# Patient Record
Sex: FEMALE | Race: OTHER | Employment: OTHER | URBAN - METROPOLITAN AREA
[De-identification: names, ages, dates, MRNs, and addresses within clinical notes are randomized per-mention and may not be internally consistent; named-entity substitution may affect disease eponyms.]

---

## 2017-11-25 ENCOUNTER — HOSPITAL ENCOUNTER (EMERGENCY)
Facility: HOSPITAL | Age: 62
Discharge: HOME | End: 2017-11-26
Attending: EMERGENCY MEDICINE

## 2017-11-25 ENCOUNTER — APPOINTMENT (OUTPATIENT)
Dept: GENERAL RADIOLOGY | Facility: HOSPITAL | Age: 62
End: 2017-11-25

## 2017-11-25 ENCOUNTER — APPOINTMENT (OUTPATIENT)
Dept: CT IMAGING | Facility: HOSPITAL | Age: 62
End: 2017-11-25
Attending: EMERGENCY MEDICINE

## 2017-11-25 DIAGNOSIS — S82.121A CLOSED FRACTURE OF LATERAL PORTION OF RIGHT TIBIAL PLATEAU, INITIAL ENCOUNTER: Primary | ICD-10-CM

## 2017-11-25 PROCEDURE — 73560 X-RAY EXAM OF KNEE 1 OR 2: CPT | Performed by: EMERGENCY MEDICINE

## 2017-11-25 PROCEDURE — 73700 CT LOWER EXTREMITY W/O DYE: CPT | Performed by: EMERGENCY MEDICINE

## 2017-11-25 PROCEDURE — 99284 EMERGENCY DEPT VISIT MOD MDM: CPT

## 2017-11-25 RX ORDER — TRAMADOL HYDROCHLORIDE 50 MG/1
50 TABLET ORAL ONCE
Status: COMPLETED | OUTPATIENT
Start: 2017-11-25 | End: 2017-11-25

## 2017-11-25 RX ORDER — IBUPROFEN 600 MG/1
600 TABLET ORAL ONCE
Status: COMPLETED | OUTPATIENT
Start: 2017-11-25 | End: 2017-11-25

## 2017-11-25 RX ORDER — ACETAMINOPHEN 500 MG
1000 TABLET ORAL ONCE
Status: COMPLETED | OUTPATIENT
Start: 2017-11-25 | End: 2017-11-25

## 2017-11-25 RX ORDER — TRAMADOL HYDROCHLORIDE 50 MG/1
50 TABLET ORAL EVERY 4 HOURS PRN
Qty: 14 TABLET | Refills: 0 | Status: SHIPPED | OUTPATIENT
Start: 2017-11-25 | End: 2017-12-02

## 2017-11-26 VITALS
RESPIRATION RATE: 18 BRPM | WEIGHT: 175 LBS | HEART RATE: 63 BPM | HEIGHT: 68 IN | TEMPERATURE: 98 F | BODY MASS INDEX: 26.52 KG/M2 | DIASTOLIC BLOOD PRESSURE: 75 MMHG | SYSTOLIC BLOOD PRESSURE: 137 MMHG | OXYGEN SATURATION: 97 %

## 2017-11-26 NOTE — ED PROVIDER NOTES
Patient Seen in: [facility] AND [facility] Emergency Department    History   Patient presents with:  Fall (musculoskeletal, neurologic)    Stated Complaint: right knee pain from fall/ no loc     HPI    HPI: Aneudy Gómez is a 60year old female who presents aft contusion    ED Course   Labs Reviewed - No data to display    MDM     Radiology result:Xr Knee (1 Or 2 Views), Right (cpt=73560)    Result Date: 11/26/2017  CONCLUSION:  1. Acute nondisplaced fracture of the proximal tibia. 2. Lipohemarthrosis.  3. Finding

## 2017-11-26 NOTE — ED INITIAL ASSESSMENT (HPI)
PT reports mechanical fall appx 30 minutes pta, has pain/swelling/abrasion to right knee, has minor swelling/pain to left knee.

## 2020-06-10 PROBLEM — I48.91 ATRIAL FIBRILLATION AND FLUTTER (HCC): Status: ACTIVE | Noted: 2020-06-10

## 2020-06-10 PROBLEM — I48.92 ATRIAL FIBRILLATION AND FLUTTER (HCC): Status: ACTIVE | Noted: 2020-06-10

## 2020-06-10 PROBLEM — I10 ESSENTIAL HYPERTENSION: Status: ACTIVE | Noted: 2020-06-10

## 2020-06-10 PROBLEM — M17.0 PRIMARY OSTEOARTHRITIS OF BOTH KNEES: Status: ACTIVE | Noted: 2020-06-10

## 2020-06-10 PROBLEM — F41.9 ANXIETY: Status: ACTIVE | Noted: 2020-06-10

## 2020-06-10 NOTE — PROGRESS NOTES
Casey Low is a 71year old female.   Patient presents with:  Establish Care      HPI:   Pt comes as a new pt --here with her daughter Daisy Burnett out PSR   C/c establish care   C/o will go for left  knee surg July 17th with dr Wilner Campos   Any little thing stre shortness of breath, cough, wheezing  CARDIOVASCULAR: denies chest pain on exertion, palpitations,+ swelling in feet  GI: denies abdominal pain and denies heartburn, nausea or vomiting  MUS: + back pain--upper back and neck and sees PT and chiropractor  ,+

## 2020-07-01 ENCOUNTER — OFFICE VISIT (OUTPATIENT)
Dept: CARDIOLOGY CLINIC | Facility: CLINIC | Age: 65
End: 2020-07-01
Payer: MEDICARE

## 2020-07-01 ENCOUNTER — OFFICE VISIT (OUTPATIENT)
Dept: INTERNAL MEDICINE CLINIC | Facility: CLINIC | Age: 65
End: 2020-07-01
Payer: MEDICARE

## 2020-07-01 VITALS
SYSTOLIC BLOOD PRESSURE: 113 MMHG | RESPIRATION RATE: 18 BRPM | HEIGHT: 68 IN | DIASTOLIC BLOOD PRESSURE: 70 MMHG | BODY MASS INDEX: 28.34 KG/M2 | WEIGHT: 187 LBS | HEART RATE: 57 BPM

## 2020-07-01 VITALS
HEART RATE: 57 BPM | DIASTOLIC BLOOD PRESSURE: 75 MMHG | BODY MASS INDEX: 28.34 KG/M2 | HEIGHT: 68 IN | SYSTOLIC BLOOD PRESSURE: 130 MMHG | TEMPERATURE: 98 F | WEIGHT: 187 LBS

## 2020-07-01 DIAGNOSIS — E78.5 DYSLIPIDEMIA: ICD-10-CM

## 2020-07-01 DIAGNOSIS — Z01.818 PRE-OP EXAM: Primary | ICD-10-CM

## 2020-07-01 DIAGNOSIS — Z12.31 VISIT FOR SCREENING MAMMOGRAM: ICD-10-CM

## 2020-07-01 DIAGNOSIS — E55.9 VITAMIN D DEFICIENCY: ICD-10-CM

## 2020-07-01 DIAGNOSIS — M17.12 LOCALIZED PRIMARY OSTEOARTHRITIS OF LEFT LOWER LEG: ICD-10-CM

## 2020-07-01 DIAGNOSIS — I48.91 ATRIAL FIBRILLATION, UNSPECIFIED TYPE (HCC): Primary | ICD-10-CM

## 2020-07-01 DIAGNOSIS — M79.605 PAIN IN LEFT LEG: ICD-10-CM

## 2020-07-01 DIAGNOSIS — R53.1 WEAKNESS: ICD-10-CM

## 2020-07-01 DIAGNOSIS — E05.90 SUBCLINICAL HYPERTHYROIDISM: ICD-10-CM

## 2020-07-01 DIAGNOSIS — Z23 NEED FOR VACCINATION: ICD-10-CM

## 2020-07-01 PROCEDURE — 90715 TDAP VACCINE 7 YRS/> IM: CPT | Performed by: INTERNAL MEDICINE

## 2020-07-01 PROCEDURE — 90471 IMMUNIZATION ADMIN: CPT | Performed by: INTERNAL MEDICINE

## 2020-07-01 PROCEDURE — G0463 HOSPITAL OUTPT CLINIC VISIT: HCPCS | Performed by: INTERNAL MEDICINE

## 2020-07-01 PROCEDURE — G0463 HOSPITAL OUTPT CLINIC VISIT: HCPCS | Performed by: NURSE PRACTITIONER

## 2020-07-01 PROCEDURE — 99203 OFFICE O/P NEW LOW 30 MIN: CPT | Performed by: NURSE PRACTITIONER

## 2020-07-01 PROCEDURE — 99214 OFFICE O/P EST MOD 30 MIN: CPT | Performed by: INTERNAL MEDICINE

## 2020-07-01 RX ORDER — ATORVASTATIN CALCIUM 10 MG/1
5 TABLET, FILM COATED ORAL NIGHTLY
Qty: 45 TABLET | Refills: 3 | Status: SHIPPED | OUTPATIENT
Start: 2020-07-01 | End: 2021-01-27

## 2020-07-01 RX ORDER — ERGOCALCIFEROL 1.25 MG/1
50000 CAPSULE ORAL WEEKLY
Qty: 4 CAPSULE | Refills: 3 | Status: SHIPPED | OUTPATIENT
Start: 2020-07-01 | End: 2021-03-23

## 2020-07-01 NOTE — PROGRESS NOTES
Aneta Montoya is a 76year old female.   Patient presents with:  Pre-Op Exam: scheduled for 7/17 with Dr. David Lizarraga left total knee arthroplasty ph # [phone] fax# [phone]      HPI:   Pt comes for pre op  C/c pre op   C/o left total knee arthroplast Hyperlipidemia    • Hypothyroidism       Past Surgical History:   Procedure Laterality Date   • Appendectomy     •        section x 3    • Hemorrhoidectomy     • Other surgical history  1986    Fibroid   • Other surgical history      breas CULTURE, ROUTINE; Future  We will check labs, chest x-ray, urine, aware to do the stress test and echo as ordered by cardiology    Localized primary osteoarthritis of left lower leg  And  Pain in left leg   -     PTT, ACTIVATED;  Future  And  Weakness   -

## 2020-07-01 NOTE — PROGRESS NOTES
Edward Machado is a 76year old female. Patient presents with:  Consult  Atrial Fibrillation: hx PAF  Surgical/procedure Clearance: left knee replacement 7/17/20   Hypertension    HPI:   Patient comes in today for consultation.  She sees Dr. Mandi Leblanc and  (two) times daily as needed for Pain.  30 tablet 0      Past Medical History:   Diagnosis Date   • Anxiety    • Atrial fibrillation [facility])    • Essential hypertension    • Hyperlipidemia    • Hypothyroidism       Social History:  Social History    Tobacco Us She does have some risk factors for heart disease. She has been well managed with A. fib with amiodarone but due to its side effects might be advisable to switch to a different antiarrhythmic at some point.   I like her to follow-up with Dr. Carlos Solis week aft

## 2020-07-08 ENCOUNTER — HOSPITAL ENCOUNTER (OUTPATIENT)
Dept: CV DIAGNOSTICS | Facility: HOSPITAL | Age: 65
Discharge: HOME | End: 2020-07-08
Attending: NURSE PRACTITIONER
Payer: MEDICARE

## 2020-07-08 ENCOUNTER — HOSPITAL ENCOUNTER (OUTPATIENT)
Dept: NUCLEAR MEDICINE | Facility: HOSPITAL | Age: 65
End: 2020-07-08
Attending: NURSE PRACTITIONER
Payer: MEDICARE

## 2020-07-08 ENCOUNTER — HOSPITAL ENCOUNTER (OUTPATIENT)
Dept: NUCLEAR MEDICINE | Facility: HOSPITAL | Age: 65
Discharge: HOME | End: 2020-07-08
Attending: NURSE PRACTITIONER
Payer: MEDICARE

## 2020-07-08 ENCOUNTER — APPOINTMENT (OUTPATIENT)
Dept: CV DIAGNOSTICS | Facility: HOSPITAL | Age: 65
End: 2020-07-08
Attending: NURSE PRACTITIONER
Payer: MEDICARE

## 2020-07-08 ENCOUNTER — LAB ENCOUNTER (OUTPATIENT)
Dept: LAB | Facility: HOSPITAL | Age: 65
End: 2020-07-08
Attending: NURSE PRACTITIONER
Payer: MEDICARE

## 2020-07-08 DIAGNOSIS — E78.5 DYSLIPIDEMIA: ICD-10-CM

## 2020-07-08 DIAGNOSIS — R53.1 WEAKNESS: ICD-10-CM

## 2020-07-08 DIAGNOSIS — I48.91 ATRIAL FIBRILLATION, UNSPECIFIED TYPE (HCC): ICD-10-CM

## 2020-07-08 DIAGNOSIS — E05.90 SUBCLINICAL HYPERTHYROIDISM: ICD-10-CM

## 2020-07-08 DIAGNOSIS — Z01.818 PRE-OP EXAM: ICD-10-CM

## 2020-07-08 DIAGNOSIS — M79.605 PAIN IN LEFT LEG: ICD-10-CM

## 2020-07-08 LAB
ALBUMIN SERPL-MCNC: 3.4 G/DL (ref 3.4–5)
ALBUMIN/GLOB SERPL: 0.9 {RATIO} (ref 1–2)
ALP LIVER SERPL-CCNC: 81 U/L (ref 55–142)
ALT SERPL-CCNC: 35 U/L (ref 13–56)
ANION GAP SERPL CALC-SCNC: 4 MMOL/L (ref 0–18)
APTT PPP: 27.5 SECONDS (ref 23.2–35.3)
AST SERPL-CCNC: 29 U/L (ref 15–37)
BASOPHILS # BLD AUTO: 0.01 X10(3) UL (ref 0–0.2)
BASOPHILS NFR BLD AUTO: 0.2 %
BILIRUB SERPL-MCNC: 0.4 MG/DL (ref 0.1–2)
BILIRUB UR QL: NEGATIVE
BUN BLD-MCNC: 15 MG/DL (ref 7–18)
BUN/CREAT SERPL: 14.9 (ref 10–20)
CALCIUM BLD-MCNC: 10.1 MG/DL (ref 8.5–10.1)
CHLORIDE SERPL-SCNC: 110 MMOL/L (ref 98–112)
CHOLEST SMN-MCNC: 270 MG/DL (ref ?–200)
CO2 SERPL-SCNC: 29 MMOL/L (ref 21–32)
COLOR UR: YELLOW
CREAT BLD-MCNC: 1.01 MG/DL (ref 0.55–1.02)
DEPRECATED RDW RBC AUTO: 46.5 FL (ref 35.1–46.3)
EOSINOPHIL # BLD AUTO: 0.24 X10(3) UL (ref 0–0.7)
EOSINOPHIL NFR BLD AUTO: 5.2 %
ERYTHROCYTE [DISTWIDTH] IN BLOOD BY AUTOMATED COUNT: 13.7 % (ref 11–15)
GLOBULIN PLAS-MCNC: 3.6 G/DL (ref 2.8–4.4)
GLUCOSE BLD-MCNC: 88 MG/DL (ref 70–99)
GLUCOSE UR-MCNC: NEGATIVE MG/DL
HCT VFR BLD AUTO: 40.2 % (ref 35–48)
HDLC SERPL-MCNC: 72 MG/DL (ref 40–59)
HGB BLD-MCNC: 13.3 G/DL (ref 12–16)
HGB UR QL STRIP.AUTO: NEGATIVE
IMM GRANULOCYTES # BLD AUTO: 0.01 X10(3) UL (ref 0–1)
IMM GRANULOCYTES NFR BLD: 0.2 %
KETONES UR-MCNC: NEGATIVE MG/DL
LDLC SERPL CALC-MCNC: 180 MG/DL (ref ?–100)
LEUKOCYTE ESTERASE UR QL STRIP.AUTO: NEGATIVE
LYMPHOCYTES # BLD AUTO: 1.18 X10(3) UL (ref 1–4)
LYMPHOCYTES NFR BLD AUTO: 25.6 %
M PROTEIN MFR SERPL ELPH: 7 G/DL (ref 6.4–8.2)
MCH RBC QN AUTO: 30.5 PG (ref 26–34)
MCHC RBC AUTO-ENTMCNC: 33.1 G/DL (ref 31–37)
MCV RBC AUTO: 92.2 FL (ref 80–100)
MONOCYTES # BLD AUTO: 0.47 X10(3) UL (ref 0.1–1)
MONOCYTES NFR BLD AUTO: 10.2 %
NEUTROPHILS # BLD AUTO: 2.7 X10 (3) UL (ref 1.5–7.7)
NEUTROPHILS # BLD AUTO: 2.7 X10(3) UL (ref 1.5–7.7)
NEUTROPHILS NFR BLD AUTO: 58.6 %
NITRITE UR QL STRIP.AUTO: NEGATIVE
NONHDLC SERPL-MCNC: 198 MG/DL (ref ?–130)
OSMOLALITY SERPL CALC.SUM OF ELEC: 296 MOSM/KG (ref 275–295)
PATIENT FASTING Y/N/NP: YES
PATIENT FASTING Y/N/NP: YES
PH UR: 7 [PH] (ref 5–8)
PLATELET # BLD AUTO: 210 10(3)UL (ref 150–450)
POTASSIUM SERPL-SCNC: 4.5 MMOL/L (ref 3.5–5.1)
PROT UR-MCNC: NEGATIVE MG/DL
RBC # BLD AUTO: 4.36 X10(6)UL (ref 3.8–5.3)
RBC #/AREA URNS AUTO: 0 /HPF
SODIUM SERPL-SCNC: 143 MMOL/L (ref 136–145)
SP GR UR STRIP: 1.02 (ref 1–1.03)
T3FREE SERPL-MCNC: 2.54 PG/ML (ref 2.4–4.2)
TRIGL SERPL-MCNC: 91 MG/DL (ref 30–149)
TSI SER-ACNC: 0.58 MIU/ML (ref 0.36–3.74)
UROBILINOGEN UR STRIP-ACNC: <2
VLDLC SERPL CALC-MCNC: 18 MG/DL (ref 0–30)
WBC # BLD AUTO: 4.6 X10(3) UL (ref 4–11)
WBC #/AREA URNS AUTO: 1 /HPF

## 2020-07-08 PROCEDURE — 93306 TTE W/DOPPLER COMPLETE: CPT | Performed by: NURSE PRACTITIONER

## 2020-07-08 PROCEDURE — 36415 COLL VENOUS BLD VENIPUNCTURE: CPT

## 2020-07-08 PROCEDURE — 93018 CV STRESS TEST I&R ONLY: CPT | Performed by: NURSE PRACTITIONER

## 2020-07-08 PROCEDURE — 84443 ASSAY THYROID STIM HORMONE: CPT

## 2020-07-08 PROCEDURE — 85025 COMPLETE CBC W/AUTO DIFF WBC: CPT

## 2020-07-08 PROCEDURE — 84481 FREE ASSAY (FT-3): CPT

## 2020-07-08 PROCEDURE — 80053 COMPREHEN METABOLIC PANEL: CPT

## 2020-07-08 PROCEDURE — 93016 CV STRESS TEST SUPVJ ONLY: CPT | Performed by: NURSE PRACTITIONER

## 2020-07-08 PROCEDURE — 85730 THROMBOPLASTIN TIME PARTIAL: CPT

## 2020-07-08 PROCEDURE — 81003 URINALYSIS AUTO W/O SCOPE: CPT

## 2020-07-08 PROCEDURE — 78452 HT MUSCLE IMAGE SPECT MULT: CPT | Performed by: NURSE PRACTITIONER

## 2020-07-08 PROCEDURE — 87086 URINE CULTURE/COLONY COUNT: CPT

## 2020-07-08 PROCEDURE — 93017 CV STRESS TEST TRACING ONLY: CPT | Performed by: NURSE PRACTITIONER

## 2020-07-08 PROCEDURE — 80061 LIPID PANEL: CPT

## 2020-07-09 ENCOUNTER — TELEPHONE (OUTPATIENT)
Dept: CARDIOLOGY CLINIC | Facility: CLINIC | Age: 65
End: 2020-07-09

## 2020-07-09 DIAGNOSIS — I48.0 PAROXYSMAL A-FIB (HCC): ICD-10-CM

## 2020-07-09 DIAGNOSIS — E78.5 DYSLIPIDEMIA: Primary | ICD-10-CM

## 2020-07-09 NOTE — TELEPHONE ENCOUNTER
S/w Savana Trevizo (dtr) and relayed all instructions, as requested. F/u appt made with PP.  Surgical clearance letter generated and sent      Notes recorded by Kathaleen Dandy, RN on 7/9/2020 at 2:47 PM CDT  Per language line #[id number], s/w Ernie Moe relayed- LDL very

## 2020-07-13 ENCOUNTER — LAB ENCOUNTER (OUTPATIENT)
Dept: LAB | Facility: REFERENCE LAB | Age: 65
End: 2020-07-13
Attending: ORTHOPAEDIC SURGERY
Payer: MEDICARE

## 2020-07-13 ENCOUNTER — HOSPITAL ENCOUNTER (OUTPATIENT)
Dept: GENERAL RADIOLOGY | Facility: HOSPITAL | Age: 65
Discharge: HOME | End: 2020-07-13
Attending: INTERNAL MEDICINE
Payer: MEDICARE

## 2020-07-13 ENCOUNTER — TELEPHONE (OUTPATIENT)
Dept: INTERNAL MEDICINE CLINIC | Facility: CLINIC | Age: 65
End: 2020-07-13

## 2020-07-13 DIAGNOSIS — Z01.818 PRE-OP EXAM: ICD-10-CM

## 2020-07-13 DIAGNOSIS — Z01.818 PREOPERATIVE TESTING: ICD-10-CM

## 2020-07-13 LAB
ANTIBODY SCREEN: NEGATIVE
RH BLOOD TYPE: POSITIVE

## 2020-07-13 PROCEDURE — 87641 MR-STAPH DNA AMP PROBE: CPT

## 2020-07-13 PROCEDURE — 71046 X-RAY EXAM CHEST 2 VIEWS: CPT | Performed by: INTERNAL MEDICINE

## 2020-07-13 PROCEDURE — 86901 BLOOD TYPING SEROLOGIC RH(D): CPT

## 2020-07-13 PROCEDURE — 36415 COLL VENOUS BLD VENIPUNCTURE: CPT

## 2020-07-13 PROCEDURE — 86850 RBC ANTIBODY SCREEN: CPT

## 2020-07-13 PROCEDURE — 86900 BLOOD TYPING SEROLOGIC ABO: CPT

## 2020-07-13 NOTE — TELEPHONE ENCOUNTER
Aubree Abdi from Dr. Larissa Green' office called regarding  needing the OK for surgery clearance from Dr. Denny Walker.       Preop clearance needs to be  faxed to [phone].

## 2020-07-14 ENCOUNTER — LAB ENCOUNTER (OUTPATIENT)
Dept: LAB | Facility: HOSPITAL | Age: 65
End: 2020-07-14
Attending: ORTHOPAEDIC SURGERY
Payer: MEDICARE

## 2020-07-14 DIAGNOSIS — Z01.818 PREOPERATIVE TESTING: ICD-10-CM

## 2020-07-14 LAB
MRSA DNA SPEC QL NAA+PROBE: NEGATIVE
SARS-COV-2 RNA RESP QL NAA+PROBE: NOT DETECTED

## 2020-07-14 NOTE — TELEPHONE ENCOUNTER
Labs and urine and stress test  reviewed   cxr done today is still pending   Will write addendum onceout and then pls fax over the note

## 2020-07-15 ENCOUNTER — TELEPHONE (OUTPATIENT)
Dept: INTERNAL MEDICINE CLINIC | Facility: CLINIC | Age: 65
End: 2020-07-15

## 2020-07-15 NOTE — TELEPHONE ENCOUNTER
Dr. Gideon Gallo' Office called in stating that they received H&P, labs, and cxr but they are still awaiting clearance from Dr. Sue Walls.     Fax# [phone]    Patient is scheduled for Friday, 7/17. She is requesting clearance by end of day today. Please advise.

## 2020-07-16 NOTE — H&P
[facility] Patient Status:  Surgery Admit - Inpt    1955 MRN [id number]   Location [place] [address] Attending Dheeraj Back, *   Hosp Day # 0 PCP FELIX Delgado 5' 9\" (1.753 m), weight 185 lb (83.9 kg). General appearance: alert, appears stated age and cooperative  Extremities: On physical examination she has a markedly antalgic gait on the left side.  There is a 10 degree flexion contracture with further flex

## 2020-07-17 ENCOUNTER — APPOINTMENT (OUTPATIENT)
Dept: GENERAL RADIOLOGY | Facility: HOSPITAL | Age: 65
DRG: 470 | End: 2020-07-17
Attending: PHYSICIAN ASSISTANT
Payer: MEDICARE

## 2020-07-17 ENCOUNTER — HOSPITAL ENCOUNTER (INPATIENT)
Facility: HOSPITAL | Age: 65
LOS: 3 days | Discharge: HOME HEALTH | DRG: 470 | End: 2020-07-20
Attending: ORTHOPAEDIC SURGERY | Admitting: ORTHOPAEDIC SURGERY
Payer: MEDICARE

## 2020-07-17 ENCOUNTER — ANESTHESIA (OUTPATIENT)
Dept: SURGERY | Facility: HOSPITAL | Age: 65
DRG: 470 | End: 2020-07-17
Payer: MEDICARE

## 2020-07-17 ENCOUNTER — ANESTHESIA EVENT (OUTPATIENT)
Dept: SURGERY | Facility: HOSPITAL | Age: 65
DRG: 470 | End: 2020-07-17
Payer: MEDICARE

## 2020-07-17 DIAGNOSIS — Z01.818 PREOPERATIVE TESTING: Primary | ICD-10-CM

## 2020-07-17 PROBLEM — E03.9 HYPOTHYROIDISM: Chronic | Status: ACTIVE | Noted: 2020-07-17

## 2020-07-17 PROBLEM — M17.12 PRIMARY OSTEOARTHRITIS OF LEFT KNEE: Status: ACTIVE | Noted: 2020-07-17

## 2020-07-17 PROBLEM — E78.5 HYPERLIPIDEMIA: Chronic | Status: ACTIVE | Noted: 2020-07-17

## 2020-07-17 PROBLEM — I48.0 PAROXYSMAL ATRIAL FIBRILLATION (HCC): Chronic | Status: ACTIVE | Noted: 2020-07-17

## 2020-07-17 LAB
DEPRECATED RDW RBC AUTO: 45.5 FL (ref 35.1–46.3)
ERYTHROCYTE [DISTWIDTH] IN BLOOD BY AUTOMATED COUNT: 13.3 % (ref 11–15)
HCT VFR BLD AUTO: 38 % (ref 35–48)
HGB BLD-MCNC: 12.7 G/DL (ref 12–16)
MCH RBC QN AUTO: 30.8 PG (ref 26–34)
MCHC RBC AUTO-ENTMCNC: 33.4 G/DL (ref 31–37)
MCV RBC AUTO: 92.2 FL (ref 80–100)
PLATELET # BLD AUTO: 170 10(3)UL (ref 150–450)
RBC # BLD AUTO: 4.12 X10(6)UL (ref 3.8–5.3)
WBC # BLD AUTO: 5.1 X10(3) UL (ref 4–11)

## 2020-07-17 PROCEDURE — 73560 X-RAY EXAM OF KNEE 1 OR 2: CPT | Performed by: PHYSICIAN ASSISTANT

## 2020-07-17 PROCEDURE — 99232 SBSQ HOSP IP/OBS MODERATE 35: CPT | Performed by: HOSPITALIST

## 2020-07-17 PROCEDURE — 0SRD0J9 REPLACEMENT OF LEFT KNEE JOINT WITH SYNTHETIC SUBSTITUTE, CEMENTED, OPEN APPROACH: ICD-10-PCS | Performed by: ORTHOPAEDIC SURGERY

## 2020-07-17 DEVICE — RESURFACING PATELLA SIZE 3
Type: IMPLANTABLE DEVICE | Site: KNEE | Status: FUNCTIONAL
Brand: GMK PRIMARY TOTAL KNEE SYSTEM

## 2020-07-17 DEVICE — FEMUR SPHERE CEMENTED LEFT, SIZE 5+
Type: IMPLANTABLE DEVICE | Site: KNEE | Status: FUNCTIONAL
Brand: GMK SPHERE TOTAL KNEE SYSTEM

## 2020-07-17 DEVICE — REFOBACIN BC R 1X40 US: Type: IMPLANTABLE DEVICE | Site: KNEE | Status: FUNCTIONAL

## 2020-07-17 DEVICE — GMK SPHERE KNEE: Type: IMPLANTABLE DEVICE | Status: FUNCTIONAL

## 2020-07-17 DEVICE — FIXED TIBIAL TRAY CEMENTED LEFT, SIZE 4
Type: IMPLANTABLE DEVICE | Site: KNEE | Status: FUNCTIONAL
Brand: GMK PRIMARY TOTAL KNEE SYSTEM

## 2020-07-17 DEVICE — TIBIAL INSERT FIXED SPHERE FLEX #4/10 MM L
Type: IMPLANTABLE DEVICE | Site: KNEE | Status: FUNCTIONAL
Brand: GMK SPHERE TOTAL KNEE SYSTEM

## 2020-07-17 RX ORDER — HYDROCODONE BITARTRATE AND ACETAMINOPHEN 7.5; 325 MG/1; MG/1
1 TABLET ORAL EVERY 6 HOURS PRN
Status: ACTIVE | OUTPATIENT
Start: 2020-07-17 | End: 2020-07-18

## 2020-07-17 RX ORDER — HYDROMORPHONE HYDROCHLORIDE 1 MG/ML
0.6 INJECTION, SOLUTION INTRAMUSCULAR; INTRAVENOUS; SUBCUTANEOUS EVERY 5 MIN PRN
Status: DISCONTINUED | OUTPATIENT
Start: 2020-07-17 | End: 2020-07-17 | Stop reason: HOSPADM

## 2020-07-17 RX ORDER — NALOXONE HYDROCHLORIDE 0.4 MG/ML
0.08 INJECTION, SOLUTION INTRAMUSCULAR; INTRAVENOUS; SUBCUTANEOUS
Status: ACTIVE | OUTPATIENT
Start: 2020-07-17 | End: 2020-07-18

## 2020-07-17 RX ORDER — SODIUM CHLORIDE, SODIUM LACTATE, POTASSIUM CHLORIDE, CALCIUM CHLORIDE 600; 310; 30; 20 MG/100ML; MG/100ML; MG/100ML; MG/100ML
INJECTION, SOLUTION INTRAVENOUS CONTINUOUS
Status: DISCONTINUED | OUTPATIENT
Start: 2020-07-17 | End: 2020-07-20

## 2020-07-17 RX ORDER — HYDROMORPHONE HYDROCHLORIDE 1 MG/ML
0.4 INJECTION, SOLUTION INTRAMUSCULAR; INTRAVENOUS; SUBCUTANEOUS EVERY 5 MIN PRN
Status: DISCONTINUED | OUTPATIENT
Start: 2020-07-17 | End: 2020-07-17 | Stop reason: HOSPADM

## 2020-07-17 RX ORDER — ACETAMINOPHEN 325 MG/1
650 TABLET ORAL EVERY 6 HOURS PRN
Status: ACTIVE | OUTPATIENT
Start: 2020-07-17 | End: 2020-07-18

## 2020-07-17 RX ORDER — PROCHLORPERAZINE EDISYLATE 5 MG/ML
5 INJECTION INTRAMUSCULAR; INTRAVENOUS ONCE AS NEEDED
Status: DISCONTINUED | OUTPATIENT
Start: 2020-07-17 | End: 2020-07-17 | Stop reason: HOSPADM

## 2020-07-17 RX ORDER — MORPHINE SULFATE 4 MG/ML
4 INJECTION, SOLUTION INTRAMUSCULAR; INTRAVENOUS EVERY 2 HOUR PRN
Status: DISCONTINUED | OUTPATIENT
Start: 2020-07-17 | End: 2020-07-20

## 2020-07-17 RX ORDER — MORPHINE SULFATE 2 MG/ML
1 INJECTION, SOLUTION INTRAMUSCULAR; INTRAVENOUS EVERY 2 HOUR PRN
Status: DISCONTINUED | OUTPATIENT
Start: 2020-07-17 | End: 2020-07-20

## 2020-07-17 RX ORDER — PROCHLORPERAZINE EDISYLATE 5 MG/ML
5 INJECTION INTRAMUSCULAR; INTRAVENOUS ONCE AS NEEDED
Status: ACTIVE | OUTPATIENT
Start: 2020-07-17 | End: 2020-07-17

## 2020-07-17 RX ORDER — DIPHENHYDRAMINE HYDROCHLORIDE 50 MG/ML
25 INJECTION INTRAMUSCULAR; INTRAVENOUS ONCE AS NEEDED
Status: ACTIVE | OUTPATIENT
Start: 2020-07-17 | End: 2020-07-17

## 2020-07-17 RX ORDER — FAMOTIDINE 20 MG/1
20 TABLET ORAL ONCE
Status: DISCONTINUED | OUTPATIENT
Start: 2020-07-17 | End: 2020-07-17 | Stop reason: HOSPADM

## 2020-07-17 RX ORDER — HYDROCODONE BITARTRATE AND ACETAMINOPHEN 5; 325 MG/1; MG/1
1 TABLET ORAL EVERY 4 HOURS PRN
Status: DISCONTINUED | OUTPATIENT
Start: 2020-07-17 | End: 2020-07-20

## 2020-07-17 RX ORDER — HYDROCODONE BITARTRATE AND ACETAMINOPHEN 5; 325 MG/1; MG/1
2 TABLET ORAL EVERY 4 HOURS PRN
Status: DISCONTINUED | OUTPATIENT
Start: 2020-07-17 | End: 2020-07-20

## 2020-07-17 RX ORDER — ACETAMINOPHEN 325 MG/1
650 TABLET ORAL EVERY 4 HOURS PRN
Status: DISCONTINUED | OUTPATIENT
Start: 2020-07-17 | End: 2020-07-20

## 2020-07-17 RX ORDER — LEVOTHYROXINE AND LIOTHYRONINE 38; 9 UG/1; UG/1
60 TABLET ORAL DAILY
Status: DISCONTINUED | OUTPATIENT
Start: 2020-07-18 | End: 2020-07-20

## 2020-07-17 RX ORDER — FAMOTIDINE 10 MG/ML
20 INJECTION, SOLUTION INTRAVENOUS 2 TIMES DAILY
Status: DISCONTINUED | OUTPATIENT
Start: 2020-07-17 | End: 2020-07-20

## 2020-07-17 RX ORDER — SODIUM PHOSPHATE, DIBASIC AND SODIUM PHOSPHATE, MONOBASIC 7; 19 G/133ML; G/133ML
1 ENEMA RECTAL ONCE AS NEEDED
Status: DISCONTINUED | OUTPATIENT
Start: 2020-07-17 | End: 2020-07-20

## 2020-07-17 RX ORDER — MELATONIN
325
Status: DISCONTINUED | OUTPATIENT
Start: 2020-07-17 | End: 2020-07-20

## 2020-07-17 RX ORDER — SENNOSIDES 8.6 MG
17.2 TABLET ORAL NIGHTLY
Status: DISCONTINUED | OUTPATIENT
Start: 2020-07-17 | End: 2020-07-20

## 2020-07-17 RX ORDER — MIDAZOLAM HYDROCHLORIDE 1 MG/ML
INJECTION INTRAMUSCULAR; INTRAVENOUS AS NEEDED
Status: DISCONTINUED | OUTPATIENT
Start: 2020-07-17 | End: 2020-07-17 | Stop reason: SURG

## 2020-07-17 RX ORDER — METOCLOPRAMIDE 10 MG/1
10 TABLET ORAL ONCE
Status: DISCONTINUED | OUTPATIENT
Start: 2020-07-17 | End: 2020-07-17 | Stop reason: HOSPADM

## 2020-07-17 RX ORDER — MORPHINE SULFATE 10 MG/ML
6 INJECTION, SOLUTION INTRAMUSCULAR; INTRAVENOUS EVERY 10 MIN PRN
Status: DISCONTINUED | OUTPATIENT
Start: 2020-07-17 | End: 2020-07-17 | Stop reason: HOSPADM

## 2020-07-17 RX ORDER — HALOPERIDOL 5 MG/ML
0.5 INJECTION INTRAMUSCULAR ONCE AS NEEDED
Status: ACTIVE | OUTPATIENT
Start: 2020-07-17 | End: 2020-07-17

## 2020-07-17 RX ORDER — HYDROMORPHONE HYDROCHLORIDE 1 MG/ML
0.4 INJECTION, SOLUTION INTRAMUSCULAR; INTRAVENOUS; SUBCUTANEOUS
Status: ACTIVE | OUTPATIENT
Start: 2020-07-17 | End: 2020-07-18

## 2020-07-17 RX ORDER — ACETAMINOPHEN 500 MG
1000 TABLET ORAL ONCE
Status: COMPLETED | OUTPATIENT
Start: 2020-07-17 | End: 2020-07-17

## 2020-07-17 RX ORDER — DIPHENHYDRAMINE HCL 25 MG
25 CAPSULE ORAL EVERY 4 HOURS PRN
Status: DISCONTINUED | OUTPATIENT
Start: 2020-07-17 | End: 2020-07-20

## 2020-07-17 RX ORDER — DOCUSATE SODIUM 100 MG/1
100 CAPSULE, LIQUID FILLED ORAL 2 TIMES DAILY
Status: DISCONTINUED | OUTPATIENT
Start: 2020-07-17 | End: 2020-07-20

## 2020-07-17 RX ORDER — ONDANSETRON 2 MG/ML
4 INJECTION INTRAMUSCULAR; INTRAVENOUS ONCE AS NEEDED
Status: ACTIVE | OUTPATIENT
Start: 2020-07-17 | End: 2020-07-17

## 2020-07-17 RX ORDER — GABAPENTIN 600 MG/1
600 TABLET ORAL ONCE
Status: COMPLETED | OUTPATIENT
Start: 2020-07-17 | End: 2020-07-17

## 2020-07-17 RX ORDER — AMIODARONE HYDROCHLORIDE 200 MG/1
100 TABLET ORAL 2 TIMES DAILY WITH MEALS
Status: DISCONTINUED | OUTPATIENT
Start: 2020-07-17 | End: 2020-07-20

## 2020-07-17 RX ORDER — HYDROMORPHONE HYDROCHLORIDE 1 MG/ML
0.6 INJECTION, SOLUTION INTRAMUSCULAR; INTRAVENOUS; SUBCUTANEOUS
Status: ACTIVE | OUTPATIENT
Start: 2020-07-17 | End: 2020-07-18

## 2020-07-17 RX ORDER — DIPHENHYDRAMINE HYDROCHLORIDE 50 MG/ML
12.5 INJECTION INTRAMUSCULAR; INTRAVENOUS EVERY 4 HOURS PRN
Status: ACTIVE | OUTPATIENT
Start: 2020-07-17 | End: 2020-07-18

## 2020-07-17 RX ORDER — HYDROCODONE BITARTRATE AND ACETAMINOPHEN 7.5; 325 MG/1; MG/1
2 TABLET ORAL EVERY 6 HOURS PRN
Status: ACTIVE | OUTPATIENT
Start: 2020-07-17 | End: 2020-07-18

## 2020-07-17 RX ORDER — ONDANSETRON 2 MG/ML
4 INJECTION INTRAMUSCULAR; INTRAVENOUS ONCE AS NEEDED
Status: DISCONTINUED | OUTPATIENT
Start: 2020-07-17 | End: 2020-07-17 | Stop reason: HOSPADM

## 2020-07-17 RX ORDER — MORPHINE SULFATE 4 MG/ML
4 INJECTION, SOLUTION INTRAMUSCULAR; INTRAVENOUS EVERY 10 MIN PRN
Status: DISCONTINUED | OUTPATIENT
Start: 2020-07-17 | End: 2020-07-17 | Stop reason: HOSPADM

## 2020-07-17 RX ORDER — DEXTROSE, SODIUM CHLORIDE, AND POTASSIUM CHLORIDE 5; .45; .15 G/100ML; G/100ML; G/100ML
INJECTION INTRAVENOUS CONTINUOUS
Status: DISCONTINUED | OUTPATIENT
Start: 2020-07-17 | End: 2020-07-18

## 2020-07-17 RX ORDER — MORPHINE SULFATE 1 MG/ML
INJECTION, SOLUTION EPIDURAL; INTRATHECAL; INTRAVENOUS
Status: COMPLETED | OUTPATIENT
Start: 2020-07-17 | End: 2020-07-17

## 2020-07-17 RX ORDER — DIPHENHYDRAMINE HYDROCHLORIDE 50 MG/ML
12.5 INJECTION INTRAMUSCULAR; INTRAVENOUS EVERY 4 HOURS PRN
Status: DISCONTINUED | OUTPATIENT
Start: 2020-07-17 | End: 2020-07-20

## 2020-07-17 RX ORDER — POLYETHYLENE GLYCOL 3350 17 G/17G
17 POWDER, FOR SOLUTION ORAL DAILY PRN
Status: DISCONTINUED | OUTPATIENT
Start: 2020-07-17 | End: 2020-07-20

## 2020-07-17 RX ORDER — ONDANSETRON 2 MG/ML
4 INJECTION INTRAMUSCULAR; INTRAVENOUS EVERY 4 HOURS PRN
Status: DISPENSED | OUTPATIENT
Start: 2020-07-17 | End: 2020-07-19

## 2020-07-17 RX ORDER — AMLODIPINE BESYLATE 5 MG/1
5 TABLET ORAL 2 TIMES DAILY
Status: DISCONTINUED | OUTPATIENT
Start: 2020-07-17 | End: 2020-07-20

## 2020-07-17 RX ORDER — MORPHINE SULFATE 2 MG/ML
2 INJECTION, SOLUTION INTRAMUSCULAR; INTRAVENOUS EVERY 2 HOUR PRN
Status: DISCONTINUED | OUTPATIENT
Start: 2020-07-17 | End: 2020-07-20

## 2020-07-17 RX ORDER — DIPHENHYDRAMINE HCL 25 MG
25 CAPSULE ORAL EVERY 4 HOURS PRN
Status: ACTIVE | OUTPATIENT
Start: 2020-07-17 | End: 2020-07-18

## 2020-07-17 RX ORDER — METOCLOPRAMIDE HYDROCHLORIDE 5 MG/ML
10 INJECTION INTRAMUSCULAR; INTRAVENOUS EVERY 6 HOURS PRN
Status: ACTIVE | OUTPATIENT
Start: 2020-07-17 | End: 2020-07-19

## 2020-07-17 RX ORDER — METOPROLOL TARTRATE 5 MG/5ML
2.5 INJECTION INTRAVENOUS ONCE
Status: DISCONTINUED | OUTPATIENT
Start: 2020-07-17 | End: 2020-07-17 | Stop reason: HOSPADM

## 2020-07-17 RX ORDER — ONDANSETRON 2 MG/ML
INJECTION INTRAMUSCULAR; INTRAVENOUS AS NEEDED
Status: DISCONTINUED | OUTPATIENT
Start: 2020-07-17 | End: 2020-07-17 | Stop reason: SURG

## 2020-07-17 RX ORDER — PROCHLORPERAZINE EDISYLATE 5 MG/ML
10 INJECTION INTRAMUSCULAR; INTRAVENOUS EVERY 6 HOURS PRN
Status: ACTIVE | OUTPATIENT
Start: 2020-07-17 | End: 2020-07-19

## 2020-07-17 RX ORDER — CELECOXIB 200 MG/1
200 CAPSULE ORAL EVERY 12 HOURS SCHEDULED
Status: DISCONTINUED | OUTPATIENT
Start: 2020-07-17 | End: 2020-07-19

## 2020-07-17 RX ORDER — VANCOMYCIN HYDROCHLORIDE
15 ONCE
Status: COMPLETED | OUTPATIENT
Start: 2020-07-17 | End: 2020-07-17

## 2020-07-17 RX ORDER — MORPHINE SULFATE 4 MG/ML
2 INJECTION, SOLUTION INTRAMUSCULAR; INTRAVENOUS EVERY 10 MIN PRN
Status: DISCONTINUED | OUTPATIENT
Start: 2020-07-17 | End: 2020-07-17 | Stop reason: HOSPADM

## 2020-07-17 RX ORDER — HYDROCODONE BITARTRATE AND ACETAMINOPHEN 5; 325 MG/1; MG/1
1 TABLET ORAL AS NEEDED
Status: DISCONTINUED | OUTPATIENT
Start: 2020-07-17 | End: 2020-07-17 | Stop reason: HOSPADM

## 2020-07-17 RX ORDER — TRANEXAMIC ACID 10 MG/ML
INJECTION, SOLUTION INTRAVENOUS AS NEEDED
Status: DISCONTINUED | OUTPATIENT
Start: 2020-07-17 | End: 2020-07-17 | Stop reason: SURG

## 2020-07-17 RX ORDER — BUPIVACAINE HYDROCHLORIDE AND EPINEPHRINE 5; 5 MG/ML; UG/ML
INJECTION, SOLUTION PERINEURAL AS NEEDED
Status: DISCONTINUED | OUTPATIENT
Start: 2020-07-17 | End: 2020-07-17 | Stop reason: HOSPADM

## 2020-07-17 RX ORDER — HALOPERIDOL 5 MG/ML
0.25 INJECTION INTRAMUSCULAR ONCE AS NEEDED
Status: DISCONTINUED | OUTPATIENT
Start: 2020-07-17 | End: 2020-07-17 | Stop reason: HOSPADM

## 2020-07-17 RX ORDER — CEFAZOLIN SODIUM/WATER 2 G/20 ML
2 SYRINGE (ML) INTRAVENOUS ONCE
Status: COMPLETED | OUTPATIENT
Start: 2020-07-17 | End: 2020-07-17

## 2020-07-17 RX ORDER — BISACODYL 10 MG
10 SUPPOSITORY, RECTAL RECTAL
Status: DISCONTINUED | OUTPATIENT
Start: 2020-07-17 | End: 2020-07-20

## 2020-07-17 RX ORDER — BUPIVACAINE HYDROCHLORIDE 7.5 MG/ML
INJECTION, SOLUTION INTRASPINAL
Status: COMPLETED | OUTPATIENT
Start: 2020-07-17 | End: 2020-07-17

## 2020-07-17 RX ORDER — HYDROCODONE BITARTRATE AND ACETAMINOPHEN 5; 325 MG/1; MG/1
2 TABLET ORAL AS NEEDED
Status: DISCONTINUED | OUTPATIENT
Start: 2020-07-17 | End: 2020-07-17 | Stop reason: HOSPADM

## 2020-07-17 RX ORDER — LIDOCAINE HYDROCHLORIDE 10 MG/ML
INJECTION, SOLUTION INFILTRATION; PERINEURAL
Status: COMPLETED | OUTPATIENT
Start: 2020-07-17 | End: 2020-07-17

## 2020-07-17 RX ORDER — NALOXONE HYDROCHLORIDE 0.4 MG/ML
80 INJECTION, SOLUTION INTRAMUSCULAR; INTRAVENOUS; SUBCUTANEOUS AS NEEDED
Status: DISCONTINUED | OUTPATIENT
Start: 2020-07-17 | End: 2020-07-17 | Stop reason: HOSPADM

## 2020-07-17 RX ORDER — HYDROMORPHONE HYDROCHLORIDE 1 MG/ML
0.2 INJECTION, SOLUTION INTRAMUSCULAR; INTRAVENOUS; SUBCUTANEOUS EVERY 5 MIN PRN
Status: DISCONTINUED | OUTPATIENT
Start: 2020-07-17 | End: 2020-07-17 | Stop reason: HOSPADM

## 2020-07-17 RX ORDER — MECLIZINE HCL 12.5 MG/1
25 TABLET ORAL ONCE
Status: COMPLETED | OUTPATIENT
Start: 2020-07-17 | End: 2020-07-17

## 2020-07-17 RX ORDER — CELECOXIB 200 MG/1
400 CAPSULE ORAL ONCE
Status: COMPLETED | OUTPATIENT
Start: 2020-07-17 | End: 2020-07-17

## 2020-07-17 RX ORDER — ATORVASTATIN CALCIUM 10 MG/1
5 TABLET, FILM COATED ORAL NIGHTLY
Status: DISCONTINUED | OUTPATIENT
Start: 2020-07-17 | End: 2020-07-20

## 2020-07-17 RX ORDER — FAMOTIDINE 20 MG/1
20 TABLET ORAL 2 TIMES DAILY
Status: DISCONTINUED | OUTPATIENT
Start: 2020-07-17 | End: 2020-07-20

## 2020-07-17 RX ORDER — SODIUM CHLORIDE, SODIUM LACTATE, POTASSIUM CHLORIDE, CALCIUM CHLORIDE 600; 310; 30; 20 MG/100ML; MG/100ML; MG/100ML; MG/100ML
INJECTION, SOLUTION INTRAVENOUS CONTINUOUS
Status: DISCONTINUED | OUTPATIENT
Start: 2020-07-17 | End: 2020-07-17 | Stop reason: HOSPADM

## 2020-07-17 RX ORDER — ALPRAZOLAM 0.5 MG/1
0.5 TABLET ORAL 2 TIMES DAILY PRN
Status: DISCONTINUED | OUTPATIENT
Start: 2020-07-17 | End: 2020-07-20

## 2020-07-17 RX ADMIN — SODIUM CHLORIDE, SODIUM LACTATE, POTASSIUM CHLORIDE, CALCIUM CHLORIDE: 600; 310; 30; 20 INJECTION, SOLUTION INTRAVENOUS at 09:19:00

## 2020-07-17 RX ADMIN — LIDOCAINE HYDROCHLORIDE 5 ML: 10 INJECTION, SOLUTION INFILTRATION; PERINEURAL at 08:02:00

## 2020-07-17 RX ADMIN — TRANEXAMIC ACID 1000 MG: 10 INJECTION, SOLUTION INTRAVENOUS at 08:20:00

## 2020-07-17 RX ADMIN — CEFAZOLIN SODIUM/WATER 2 G: 2 G/20 ML SYRINGE (ML) INTRAVENOUS at 08:12:00

## 2020-07-17 RX ADMIN — SODIUM CHLORIDE, SODIUM LACTATE, POTASSIUM CHLORIDE, CALCIUM CHLORIDE: 600; 310; 30; 20 INJECTION, SOLUTION INTRAVENOUS at 09:21:00

## 2020-07-17 RX ADMIN — VANCOMYCIN HYDROCHLORIDE 1.26 G: at 08:18:00

## 2020-07-17 RX ADMIN — MORPHINE SULFATE 0.3 MG: 1 INJECTION, SOLUTION EPIDURAL; INTRATHECAL; INTRAVENOUS at 08:02:00

## 2020-07-17 RX ADMIN — BUPIVACAINE HYDROCHLORIDE 1.6 ML: 7.5 INJECTION, SOLUTION INTRASPINAL at 08:02:00

## 2020-07-17 RX ADMIN — ONDANSETRON 4 MG: 2 INJECTION INTRAMUSCULAR; INTRAVENOUS at 08:32:00

## 2020-07-17 RX ADMIN — MIDAZOLAM HYDROCHLORIDE 2 MG: 1 INJECTION INTRAMUSCULAR; INTRAVENOUS at 08:02:00

## 2020-07-17 NOTE — PROGRESS NOTES
[facility]    Progress Note    Karie Serum Patient Status:  Inpatient    1955 MRN [id number]   Location [facility] 4W/SW/SE Attending Hollis Jerry MD   Hosp Day # 0 PCP Joseph Arias MD        Subjective:     Eva Horta fibrillation ([address]  MAINTAINED IN SR WITH AMIODARONE, CONT HOME MEDS, MONITOR.              Results:     Lab Results   Component Value Date    WBC 5.1 07/17/2020    HGB 12.7 07/17/2020    HCT 38.0 07/17/2020    .0 07/17/2020    CREATSERUM 1.01 07/08/

## 2020-07-17 NOTE — ANESTHESIA PROCEDURE NOTES
Spinal Block  Date/Time: 7/17/2020 8:02 AM  Performed by: Elysia Espinosa MD  Authorized by: Elysia Espinosa MD       General Information and Staff    Start Time:  7/17/2020 7:59 AM  End Time:  7/17/2020 8:02 AM  Anesthesiologist:  Elysia Espinosa MD  Perf Michell Hope(Attending)

## 2020-07-17 NOTE — BRIEF OP NOTE
Pre-Operative Diagnosis: osteoarthritis left knee     Post-Operative Diagnosis: osteoarthritis left knee      Procedure Performed:   Procedure(s):  left total knee arthroplasty    Surgeon(s) and Role:     * Ivette Parks MD - Primary    Assistant

## 2020-07-17 NOTE — CM/SW NOTE
MELVIN met with the pt. And her dtr. [facility] [place] at bedside. Per the pt's request her dtr. Translated for her. The pt. Lives with her dtr. And  in a 2 level home, but the pt. Resides on the main level. The pt.  Reports being independent prior to admission wit

## 2020-07-17 NOTE — ANESTHESIA POSTPROCEDURE EVALUATION
Patient: Monica Skelton    Procedure Summary     Date:  07/17/20 Room / Location:  [facility] OR  / [facility] OR    Anesthesia Start:  1239 Anesthesia Stop:      Procedure:  KNEE TOTAL REPLACEMENT (Left Knee) Diagnosis:  (osteoarthritis left knee)

## 2020-07-17 NOTE — ANESTHESIA PREPROCEDURE EVALUATION
Anesthesia PreOp Note    HPI:     Hawk Barnard is a 76year old female who presents for preoperative consultation requested by: Vahe Smith MD    Date of Surgery: 7/17/2020    Procedure(s):  KNEE TOTAL REPLACEMENT  Indication: Payam Later 25 MG Oral Tab, 25 mg 2 (two) times daily. , Disp: , Rfl: , 7/16/2020 at 2100  NP THYROID 60 MG Oral Tab, TK 1 T PO IN THE MORNING, Disp: , Rfl: , 7/16/2020 at 0900  ALPRAZolam 0.5 MG Oral Tab, Take 1 tablet (0.5 mg total) by mouth as needed. , Disp: 30 ta Sexual Activity      Alcohol use: Not Currently      Drug use: Never      Sexual activity: Not on file    Lifestyle      Physical activity:        Days per week: Not on file        Minutes per session: Not on file      Stress: Not on file    Relationships Pulmonary - normal exam   Cardiovascular   (+) hypertension, dysrhythmias,     Rhythm: regular  Rate: normal    Neuro/Psych    (+)  anxiety/panic attacks,      (-) CVA    GI/Hepatic/Renal - negative ROS     Endo/Other - negative ROS   Abdominal  - normal e

## 2020-07-17 NOTE — PLAN OF CARE
Patient is alert and oriented X 3, American speaking. Zamarripa in place. On room air, denies SOB. No acute distress noted. Patient is complaining of dizziness - bradycardia noted, MD aware and ordered meclinzine. Tele in place. Iv fluids continued.  Up with one side effects  - Notify MD/LIP if interventions unsuccessful or patient reports new pain  - Anticipate increased pain with activity and pre-medicate as appropriate  Outcome: Progressing     Problem: RISK FOR INFECTION - ADULT  Goal: Absence of fever/infecti system  Outcome: Progressing

## 2020-07-17 NOTE — INTERVAL H&P NOTE
Pre-op Diagnosis: osteoarthritis left knee    The above referenced H&P was reviewed by Frederick Kat MD on 7/17/2020, the patient was examined and no significant changes have occurred in the patient's condition since the H&P was performed.   I disc

## 2020-07-17 NOTE — PHYSICAL THERAPY NOTE
PHYSICAL THERAPY KNEE EVALUATION - INPATIENT       Room Number: 432/432-A  Evaluation Date: 7/17/2020  Type of Evaluation: Initial  Physician Order: PT Eval and Treat    Presenting Problem: L TKA  Reason for Therapy: Mobility Dysfunction and Discharge Plan [facility] '6 clicks' Inpatient Basic Mobility Short Form. Research supports that patients with this level of impairment may benefit from home with home PT to optimize functional outcomes.     Patient will benefit from continued IP PT services to address these OBJECTIVE  Precautions: Limb alert - left  Fall Risk: High fall risk    WEIGHT BEARING RESTRICTION  Weight Bearing Restriction: L lower extremity           L Lower Extremity: Weight Bearing as Tolerated    PAIN ASSESSMENT  Rating: Unable to rate  Locat Impairment: 41.77%   Standardized Score (AM-PAC Scale): 45.44   CMS Modifier (G-Code): CK    FUNCTIONAL ABILITY STATUS  Gait Assessment   Gait Assistance: Minimum assistance  Distance (ft): 6ft  Assistive Device: Rolling walker  Pattern: L Decreased stance

## 2020-07-18 PROCEDURE — 99232 SBSQ HOSP IP/OBS MODERATE 35: CPT | Performed by: HOSPITALIST

## 2020-07-18 RX ORDER — CELECOXIB 200 MG/1
200 CAPSULE ORAL EVERY 12 HOURS SCHEDULED
Qty: 60 CAPSULE | Refills: 0 | Status: SHIPPED | OUTPATIENT
Start: 2020-07-18 | End: 2020-07-20

## 2020-07-18 RX ORDER — HYDROCODONE BITARTRATE AND ACETAMINOPHEN 5; 325 MG/1; MG/1
1 TABLET ORAL EVERY 4 HOURS PRN
Qty: 50 TABLET | Refills: 0 | Status: SHIPPED | OUTPATIENT
Start: 2020-07-18 | End: 2020-07-29

## 2020-07-18 NOTE — HOME CARE LIAISON
Spoke with patient's daughter Antelmo Logan, confirmed agreeable to [address] All questions addressed and answered.

## 2020-07-18 NOTE — OCCUPATIONAL THERAPY NOTE
OCCUPATIONAL THERAPY EVALUATION - INPATIENT      Room Number: 432/432-A  Evaluation Date: 7/18/2020  Type of Evaluation: Initial  Presenting Problem: L TKR    Physician Order: IP Consult to Occupational Therapy  Reason for Therapy: ADL/IADL Dysfunction and training; Endurance training;Patient/Family education       OCCUPATIONAL THERAPY MEDICAL/SOCIAL HISTORY     Problem List   Principal Problem:    Primary osteoarthritis of left knee  Active Problems:    Essential hypertension    Hyperlipidemia    Hypothyroid Bathing (including washing, rinsing, drying)?: A Lot  -   Toileting, which includes using toilet, bedpan or urinal? : A Little  -   Putting on and taking off regular upper body clothing?: A Little  -   Taking care of personal grooming such as brushing teet

## 2020-07-18 NOTE — PLAN OF CARE
Patient alert and oriented, mostly Greek speaking, daughter at bedside to help with translation. Pain managed with norco, some nausea after dose but given zofran with relief. CMS intact, left knee with mepilex. Tele, no calls.  Voiding post real removal. frequently for physical needs  - Identify cognitive and physical deficits and behaviors that affect risk of falls.   - [place] fall precautions as indicated by assessment.  - Educate pt/family on patient safety including physical limitations  - Instruct p

## 2020-07-18 NOTE — PROGRESS NOTES
[facility]    Progress Note    Vesta Cardenas Patient Status:  Inpatient    1955 MRN [id number]   Location [facility] 4W/SW/SE Attending Aubrey Nicholson, [address] [facility] Day # 1 PCP Amira Roberts MD        Subjective:

## 2020-07-18 NOTE — PROGRESS NOTES
[facility]    Progress Note    Neema Carlos Patient Status:  Inpatient    1955 MRN [id number]   Location [facility] 4W/SW/SE Attending Constance Simpson, [address] Day # 1 PCP Brandy Gatica MD       Subjective:   Humberto Oliva Q12H  acetaminophen (TYLENOL) tab 650 mg, 650 mg, Oral, Q4H PRN    Or  HYDROcodone-acetaminophen (NORCO) 5-325 MG per tab 1 tablet, 1 tablet, Oral, Q4H PRN    Or  HYDROcodone-acetaminophen (NORCO) 5-325 MG per tab 2 tablet, 2 tablet, Oral, Q4H PRN  morphIN 13.3 07/08/2020      Lab Results   Component Value Date    .0 07/17/2020    .0 07/08/2020       No results for input(s): GLU, BUN, CREATSERUM, GFRAA, GFRNAA, CA, NA, K, CL, CO2 in the last 168 hours.           Assessment and Plan:         Prim

## 2020-07-18 NOTE — OPERATIVE REPORT
[facility]    PATIENT'S NAME: Mariama Mj   ATTENDING PHYSICIAN: Alexus Anderson MD   OPERATING PHYSICIAN: Derek Dao MD   PATIENT ACCOUNT#:   [id number]    LOCATION:  [address] [place] [address] Drive #:   [id number]       DATE OF used.  The patellar cut was made first, and a patella protector was placed over the cut surface of the patella which was then subluxed into the lateral gutter.   The anterior and posterior cruciate ligaments were excised, and the femoral template was placed pinned into place and preparations were made for the stemmed portion of the tibial component. The trochlear recess was cut for the femur. The trial components were then removed, and the bone was prepared with pulsatile lavage.   All three components were

## 2020-07-19 ENCOUNTER — APPOINTMENT (OUTPATIENT)
Dept: ULTRASOUND IMAGING | Facility: HOSPITAL | Age: 65
DRG: 470 | End: 2020-07-19
Attending: PHYSICIAN ASSISTANT
Payer: MEDICARE

## 2020-07-19 PROCEDURE — 93971 EXTREMITY STUDY: CPT | Performed by: PHYSICIAN ASSISTANT

## 2020-07-19 PROCEDURE — 99233 SBSQ HOSP IP/OBS HIGH 50: CPT | Performed by: HOSPITALIST

## 2020-07-19 RX ORDER — ONDANSETRON 4 MG/1
4 TABLET, FILM COATED ORAL EVERY 8 HOURS PRN
Qty: 20 TABLET | Refills: 0 | Status: SHIPPED | OUTPATIENT
Start: 2020-07-19 | End: 2020-07-29

## 2020-07-19 NOTE — PHYSICAL THERAPY NOTE
Attempted physical therapy x 2 attempts. On first attempt, patient going for STAT venous doppler to rule out DVT. On second attempt, venous doppler results available: patient with acute venous thrombosis involving peroneal vein.  Discussed with YOKO Cochran;

## 2020-07-19 NOTE — PROGRESS NOTES
[place] [facility]    Progress Note    Polo Payment Patient Status:  Inpatient    1955 MRN [id number]   Location [facility] 4W/SW/SE Attending Willard Washington, [address] [facility] Day # 2 PCP Antonina Landon MD        Subjective:    Bernabe

## 2020-07-19 NOTE — PROGRESS NOTES
[place] [facility]    Progress Note    [place] Patient Status:  Inpatient    1955 MRN [id number]   Location [facility] 4W/SW/SE Attending Marlo Mccollum, [address] [facility] Day # 2 PCP Vinicius Patten MD       Subjective:   Ami Fears injection 1 mg, 1 mg, Intravenous, Q2H PRN    Or  morphINE sulfate (PF) 2 MG/ML injection 2 mg, 2 mg, Intravenous, Q2H PRN    Or  morphINE sulfate (PF) 4 MG/ML injection 4 mg, 4 mg, Intravenous, Q2H PRN  famoTIDine (PEPCID) tab 20 mg, 20 mg, Oral, BID    O GFRNAA, CA, NA, K, CL, CO2 in the last 168 hours.           Assessment and Plan:         Primary osteoarthritis of left knee  S/P LKNEE ARTHROPLASTY, SPINAL BLOCK, PAIN CONTROL,  NEURO VASCULAR CHECKS, ELIQUIS DVT PROPHYLAXIS, PT/OT.        Essential hypert

## 2020-07-19 NOTE — CM/SW NOTE
Received d/c order. Patient to return home w/ [address], F2F complete. Notified [facility] liaison. Confirmed plan with Real Quiroz.     Brayan Bertrand, [address]

## 2020-07-20 VITALS
OXYGEN SATURATION: 94 % | WEIGHT: 186 LBS | BODY MASS INDEX: 27.55 KG/M2 | HEART RATE: 61 BPM | DIASTOLIC BLOOD PRESSURE: 59 MMHG | SYSTOLIC BLOOD PRESSURE: 126 MMHG | RESPIRATION RATE: 20 BRPM | TEMPERATURE: 98 F | HEIGHT: 69 IN

## 2020-07-20 PROCEDURE — 99239 HOSP IP/OBS DSCHRG MGMT >30: CPT | Performed by: HOSPITALIST

## 2020-07-20 NOTE — PLAN OF CARE
Patient resting comfortably overnight. Dressing is C/D/I. Pain managed with PRN norco and polar ice. Patient with DVT in left peroneal vein, receiving eliquis BID. CPM held. Up x1 assist and walker. Voiding freely.  Plan to d/c home with [facility] when medically for opioid side effects  - Notify MD/LIP if interventions unsuccessful or patient reports new pain  - Anticipate increased pain with activity and pre-medicate as appropriate  Outcome: Progressing     Problem: RISK FOR INFECTION - ADULT  Goal: Absence of fe system  Outcome: Progressing     Problem: HEMATOLOGIC - ADULT  Goal: Maintains hematologic stability  Description  INTERVENTIONS  - Assess for signs and symptoms of bleeding or hemorrhage  - Monitor labs and vital signs for trends  - Administer supportive

## 2020-07-20 NOTE — PLAN OF CARE
Pt aox4, Ukrainian speaking, and daughter at bedside. Cms intact, with swelling in left knee. Pt using ice therapy and taking norco for pain. Pt aware of plan of care and daughter is used at . Educated patient on xarelto and follow up care.  Pt home

## 2020-07-20 NOTE — CM/SW NOTE
Plan is for discharge home today 7/20 with [facility]. YOKO Pearce with [facility] is aware of discharge.       Minus Redding, [facility] ext [phone]

## 2020-07-20 NOTE — PHYSICAL THERAPY NOTE
PHYSICAL THERAPY KNEE TREATMENT NOTE - INPATIENT     Room Number: 432/432-A             Presenting Problem: L TKA    Problem List  Principal Problem:    Primary osteoarthritis of left knee  Active Problems:    Essential hypertension    Hyperlipidemia    Hy Restriction: None           L Lower Extremity: Weight Bearing as Tolerated    PAIN ASSESSMENT   Ratin  Location: L knee  Management Techniques: Activity promotion; Body mechanics; Relaxation;Repositioning    BALANCE  Static Sitting: Good  Dynamic Sitting level: modified independent     Goal #2  Current Status SBA with the RW   Goal #3 Patient is able to ambulate 300 feet with assistive device at assistance level: Supervision   Goal #3   Current Status 100' with the RW SBA   Goal #4 Patient will negotiate 3

## 2020-07-27 NOTE — DISCHARGE SUMMARY
[place] [facility] [place]    Discharge Summary    Karie Serum Patient Status:  Inpatient    1955 MRN [id number]   Location [facility] 4W/SW/SE Attending No att. providers found   [facility] Day # 3 PCP Joseph Arias MD     Date of Admi HOME MEDS, MONITOR.          Hyperlipidemia  CONT HOME MEDS.         Hypothyroidism  CONT HOME MEDS.         Paroxysmal atrial fibrillation (HCC)  MAINTAINED IN SR WITH AMIODARONE, CONT HOME MEDS, MONITOR.      Perenoeal dvt - start eliquis 10 bid if ok with Refills:  0     amLODIPine Besylate 5 MG Tabs  Commonly known as:  NORVASC      5 mg 2 (two) times daily. Refills:  0     atorvastatin 10 MG Tabs  Commonly known as:  LIPITOR      Take 0.5 tablets (5 mg total) by mouth nightly.    Quantity:  45 tablet  Re

## 2020-07-29 ENCOUNTER — OFFICE VISIT (OUTPATIENT)
Dept: INTERNAL MEDICINE CLINIC | Facility: CLINIC | Age: 65
End: 2020-07-29
Payer: MEDICARE

## 2020-07-29 VITALS
SYSTOLIC BLOOD PRESSURE: 135 MMHG | HEIGHT: 69 IN | HEART RATE: 58 BPM | WEIGHT: 186 LBS | DIASTOLIC BLOOD PRESSURE: 77 MMHG | BODY MASS INDEX: 27.55 KG/M2

## 2020-07-29 DIAGNOSIS — M17.12 PRIMARY OSTEOARTHRITIS OF LEFT KNEE: ICD-10-CM

## 2020-07-29 DIAGNOSIS — L89.621 PRESSURE INJURY OF LEFT HEEL, STAGE 1: ICD-10-CM

## 2020-07-29 DIAGNOSIS — F41.9 ANXIETY: ICD-10-CM

## 2020-07-29 DIAGNOSIS — I10 ESSENTIAL HYPERTENSION: ICD-10-CM

## 2020-07-29 DIAGNOSIS — I82.452 ACUTE DEEP VEIN THROMBOSIS (DVT) OF LEFT PERONEAL VEIN (HCC): Primary | ICD-10-CM

## 2020-07-29 PROCEDURE — G0463 HOSPITAL OUTPT CLINIC VISIT: HCPCS | Performed by: INTERNAL MEDICINE

## 2020-07-29 PROCEDURE — 1111F DSCHRG MED/CURRENT MED MERGE: CPT | Performed by: INTERNAL MEDICINE

## 2020-07-29 PROCEDURE — 99214 OFFICE O/P EST MOD 30 MIN: CPT | Performed by: INTERNAL MEDICINE

## 2020-07-29 RX ORDER — HYDROCODONE BITARTRATE AND ACETAMINOPHEN 5; 325 MG/1; MG/1
1 TABLET ORAL 3 TIMES DAILY PRN
Qty: 60 TABLET | Refills: 0 | Status: SHIPPED | OUTPATIENT
Start: 2020-07-29 | End: 2020-12-18

## 2020-07-29 RX ORDER — ALPRAZOLAM 0.5 MG/1
0.5 TABLET ORAL AS NEEDED
Qty: 30 TABLET | Refills: 2 | Status: SHIPPED | OUTPATIENT
Start: 2020-07-29 | End: 2020-10-14

## 2020-07-29 NOTE — PROGRESS NOTES
Gordy Mei is a 76year old female.   Patient presents with:  Post-Op      HPI:   Patient comes for follow-up  C/C.preop  C/o post op dvt -- noted she is taking the Xarelto 15 mg twice a day and she should take this for 21 days and her 21 days yvette • Anesthesia complication    • Anxiety    • Atrial fibrillation [facility])    • Back problem    • Deep vein thrombosis ([facility] [address] 2018    right leg   • Disorder of thyroid    • Essential hypertension    • Hyperlipidemia    • Hypothyroidism    • Osteoarthritis    • 0.5 MG Oral Tab; Take 1 tablet (0.5 mg total) by mouth as needed.   IPMP REVIEWED     Essential hypertension  Advised pt to follow a low salt , low sodium (including fast foods and processed foods), can look up DASH diet, exercise 30 min a day , monitor bp

## 2020-07-30 ENCOUNTER — TELEPHONE (OUTPATIENT)
Dept: INTERNAL MEDICINE CLINIC | Facility: CLINIC | Age: 65
End: 2020-07-30

## 2020-07-30 DIAGNOSIS — I82.409 RECURRENT DEEP VEIN THROMBOSIS (DVT) (HCC): Primary | ICD-10-CM

## 2020-07-30 NOTE — TELEPHONE ENCOUNTER
Prior auth request received to hospitalist office for Xarelto prescribed at discharge 7/20/20. Noted pt was seen by PCP yesterday. Per Pharmacist this has not been picked up and Out of pocket cost would be >$400. Due to high deductible.   Plan [phone]

## 2020-07-31 NOTE — TELEPHONE ENCOUNTER
Please see how we can help this patient  Please forward to the cardiology department to see if they can help–coupons?   At visit did discuss with trying to call the company, [employer], etc. to see where she can get it lower cost

## 2020-08-03 NOTE — TELEPHONE ENCOUNTER
Called pt daughter Susana Jean-Baptiste who had come with her to her appointment  For my understanding patient was supposed to take Xarelto  15 mg twice a day for 21 days which pt states would be aug 9th (not July 31st ) and then 20 mg once a day  She has enough of the 1

## 2020-08-03 NOTE — TELEPHONE ENCOUNTER
Dr. Sergo Lincoln, spoke to daughter who states patient script needs to be corrected. Hospitalist informed patient of directions Xarelto 15 mg 1 tablet twice a day for 21 days then 1 tablet by mouth daily. Directions on scripts are different.  This is why medicat

## 2020-08-03 NOTE — TELEPHONE ENCOUNTER
Patient may try www.[employer] for assistance,   Or if low income, www.socialLiterablycurity.gov/extrahelp or call 5602 5475049. We would start with these 2 options.

## 2020-08-07 ENCOUNTER — TELEPHONE (OUTPATIENT)
Dept: ORTHOPEDICS CLINIC | Facility: CLINIC | Age: 65
End: 2020-08-07

## 2020-08-11 ENCOUNTER — APPOINTMENT (OUTPATIENT)
Dept: WOUND CARE | Facility: HOSPITAL | Age: 65
End: 2020-08-11
Attending: NURSE PRACTITIONER
Payer: MEDICARE

## 2020-10-13 DIAGNOSIS — F41.9 ANXIETY: ICD-10-CM

## 2020-10-14 RX ORDER — ALPRAZOLAM 0.5 MG/1
TABLET ORAL
Qty: 30 TABLET | Refills: 0 | Status: SHIPPED | OUTPATIENT
Start: 2020-10-14 | End: 2020-11-09

## 2020-11-08 DIAGNOSIS — F41.9 ANXIETY: ICD-10-CM

## 2020-11-09 RX ORDER — ALPRAZOLAM 0.5 MG/1
TABLET ORAL
Qty: 30 TABLET | Refills: 0 | Status: SHIPPED | OUTPATIENT
Start: 2020-11-09 | End: 2020-12-16

## 2020-12-16 DIAGNOSIS — F41.9 ANXIETY: ICD-10-CM

## 2020-12-16 RX ORDER — ALPRAZOLAM 0.5 MG/1
0.5 TABLET ORAL
Qty: 30 TABLET | Refills: 0 | Status: SHIPPED | OUTPATIENT
Start: 2020-12-16 | End: 2021-01-19

## 2020-12-16 NOTE — TELEPHONE ENCOUNTER
Current Outpatient Medications:   •  ALPRAZOLAM 0.5 MG Oral Tab, TAKE 1 TABLET BY MOUTH EVERY DAY AS NEEDED, Disp: 30 tablet, Rfl: 0

## 2020-12-18 ENCOUNTER — OFFICE VISIT (OUTPATIENT)
Dept: CARDIOLOGY CLINIC | Facility: CLINIC | Age: 65
End: 2020-12-18
Payer: MEDICARE

## 2020-12-18 VITALS
DIASTOLIC BLOOD PRESSURE: 74 MMHG | WEIGHT: 176 LBS | BODY MASS INDEX: 26.07 KG/M2 | SYSTOLIC BLOOD PRESSURE: 123 MMHG | HEART RATE: 53 BPM | HEIGHT: 69 IN

## 2020-12-18 DIAGNOSIS — I48.0 PAROXYSMAL A-FIB (HCC): Primary | ICD-10-CM

## 2020-12-18 DIAGNOSIS — E78.5 DYSLIPIDEMIA: ICD-10-CM

## 2020-12-18 PROCEDURE — G0463 HOSPITAL OUTPT CLINIC VISIT: HCPCS | Performed by: INTERNAL MEDICINE

## 2020-12-18 PROCEDURE — 99214 OFFICE O/P EST MOD 30 MIN: CPT | Performed by: INTERNAL MEDICINE

## 2020-12-18 NOTE — PATIENT INSTRUCTIONS
Increase atorvastatin to 10 mg once a day. Chest x-ray as ordered. Follow-up with me in 6 months or sooner if needed.

## 2020-12-18 NOTE — PROGRESS NOTES
Chiquis Pena is a 76year old female. Patient presents with:  Consult  Atrial Fibrillation    HPI:   Patient is here for a new patient appointment for me she was seen by [facility] few months ago.   She has longstanding history of paroxysmal atrial fibrilla status: Never Smoker      Smokeless tobacco: Never Used    Alcohol use: Not Currently    Drug use: Never       REVIEW OF SYSTEMS:   GENERAL HEALTH: feels well otherwise  SKIN: denies any unusual skin lesions or rashes  RESPIRATORY: denies shortness of tung

## 2021-01-04 RX ORDER — RIVAROXABAN 20 MG/1
TABLET, FILM COATED ORAL
Qty: 30 TABLET | Refills: 3 | Status: SHIPPED | OUTPATIENT
Start: 2021-01-04 | End: 2021-04-29

## 2021-01-18 DIAGNOSIS — F41.9 ANXIETY: ICD-10-CM

## 2021-01-19 RX ORDER — ALPRAZOLAM 0.5 MG/1
0.5 TABLET ORAL
Qty: 30 TABLET | Refills: 0 | Status: SHIPPED | OUTPATIENT
Start: 2021-01-19 | End: 2021-01-27

## 2021-01-19 NOTE — TELEPHONE ENCOUNTER
Current Outpatient Medications:   •  ALPRAZolam 0.5 MG Oral Tab, Take 1 tablet (0.5 mg total) by mouth daily as needed. , Disp: 30 tablet, Rfl: 0

## 2021-01-19 NOTE — TELEPHONE ENCOUNTER
Dr. Leopoldo Ron:     Requested Prescriptions     Pending Prescriptions Disp Refills   • ALPRAZolam 0.5 MG Oral Tab 30 tablet 0     Sig: Take 1 tablet (0.5 mg total) by mouth daily as needed.          Recent Visits  Date Type Provider Dept   07/29/20 Office

## 2021-01-23 ENCOUNTER — LAB ENCOUNTER (OUTPATIENT)
Dept: LAB | Age: 66
End: 2021-01-23
Attending: NURSE PRACTITIONER
Payer: MEDICARE

## 2021-01-23 DIAGNOSIS — E78.5 DYSLIPIDEMIA: ICD-10-CM

## 2021-01-23 DIAGNOSIS — I48.0 PAROXYSMAL A-FIB (HCC): ICD-10-CM

## 2021-01-23 LAB
CHOLEST SMN-MCNC: 177 MG/DL (ref ?–200)
HDLC SERPL-MCNC: 71 MG/DL (ref 40–59)
LDLC SERPL CALC-MCNC: 92 MG/DL (ref ?–100)
NONHDLC SERPL-MCNC: 106 MG/DL (ref ?–130)
PATIENT FASTING Y/N/NP: YES
TRIGL SERPL-MCNC: 69 MG/DL (ref 30–149)
VLDLC SERPL CALC-MCNC: 14 MG/DL (ref 0–30)

## 2021-01-23 PROCEDURE — 36415 COLL VENOUS BLD VENIPUNCTURE: CPT

## 2021-01-23 PROCEDURE — 80061 LIPID PANEL: CPT

## 2021-01-26 DIAGNOSIS — E78.5 DYSLIPIDEMIA: Primary | ICD-10-CM

## 2021-01-27 ENCOUNTER — OFFICE VISIT (OUTPATIENT)
Dept: INTERNAL MEDICINE CLINIC | Facility: CLINIC | Age: 66
End: 2021-01-27
Payer: MEDICARE

## 2021-01-27 VITALS
HEIGHT: 69 IN | SYSTOLIC BLOOD PRESSURE: 149 MMHG | BODY MASS INDEX: 26.22 KG/M2 | HEART RATE: 54 BPM | WEIGHT: 177 LBS | DIASTOLIC BLOOD PRESSURE: 80 MMHG

## 2021-01-27 DIAGNOSIS — E03.9 HYPOTHYROIDISM, UNSPECIFIED TYPE: Chronic | ICD-10-CM

## 2021-01-27 DIAGNOSIS — E78.5 HYPERLIPIDEMIA, UNSPECIFIED HYPERLIPIDEMIA TYPE: Primary | Chronic | ICD-10-CM

## 2021-01-27 DIAGNOSIS — E55.9 VITAMIN D DEFICIENCY: ICD-10-CM

## 2021-01-27 DIAGNOSIS — Z72.820 POOR SLEEP: ICD-10-CM

## 2021-01-27 DIAGNOSIS — I48.91 ATRIAL FIBRILLATION, UNSPECIFIED TYPE (HCC): ICD-10-CM

## 2021-01-27 DIAGNOSIS — F41.9 ANXIETY: ICD-10-CM

## 2021-01-27 PROCEDURE — G0008 ADMIN INFLUENZA VIRUS VAC: HCPCS | Performed by: INTERNAL MEDICINE

## 2021-01-27 PROCEDURE — 99214 OFFICE O/P EST MOD 30 MIN: CPT | Performed by: INTERNAL MEDICINE

## 2021-01-27 PROCEDURE — 90662 IIV NO PRSV INCREASED AG IM: CPT | Performed by: INTERNAL MEDICINE

## 2021-01-27 RX ORDER — ATORVASTATIN CALCIUM 10 MG/1
10 TABLET, FILM COATED ORAL NIGHTLY
Qty: 90 TABLET | Refills: 3 | Status: SHIPPED | OUTPATIENT
Start: 2021-01-27

## 2021-01-27 RX ORDER — TRAZODONE HYDROCHLORIDE 50 MG/1
50 TABLET ORAL NIGHTLY
Qty: 90 TABLET | Refills: 1 | Status: SHIPPED | OUTPATIENT
Start: 2021-01-27 | End: 2021-04-07

## 2021-01-27 RX ORDER — ALPRAZOLAM 0.5 MG/1
0.5 TABLET ORAL
Qty: 30 TABLET | Refills: 0 | Status: SHIPPED | OUTPATIENT
Start: 2021-01-27 | End: 2021-07-21

## 2021-01-28 NOTE — PROGRESS NOTES
Carina Miller is a 76year old female. Patient presents with:   Follow - Up      HPI:   Pt comes for f/u with her daughter   C/c follow up, needs refills   C/o doesn't sleep well thinks its due to due to anxiety     History  6/2020   new pt --here w Osteoarthritis    • PONV (postoperative nausea and vomiting)    • Stroke [facility])     TIA   • Visual impairment     glasses      Past Surgical History:   Procedure Laterality Date   • Appendectomy     •        section x 3    • Hemorrhoidectom medications  Anxiety  -     ALPRAZolam 0.5 MG Oral Tab; Take 1 tablet (0.5 mg total) by mouth daily as needed. We will give alprazolam to be taken on an as-needed basis  Poor sleep  -     traZODone HCl 50 MG Oral Tab;  Take 1 tablet (50 mg total) by mouth

## 2021-03-08 ENCOUNTER — HOSPITAL ENCOUNTER (OUTPATIENT)
Age: 66
Discharge: HOME | End: 2021-03-08
Payer: MEDICARE

## 2021-03-08 ENCOUNTER — APPOINTMENT (OUTPATIENT)
Dept: GENERAL RADIOLOGY | Age: 66
End: 2021-03-08
Attending: EMERGENCY MEDICINE
Payer: MEDICARE

## 2021-03-08 VITALS
SYSTOLIC BLOOD PRESSURE: 144 MMHG | BODY MASS INDEX: 26.52 KG/M2 | RESPIRATION RATE: 18 BRPM | HEART RATE: 57 BPM | TEMPERATURE: 98 F | DIASTOLIC BLOOD PRESSURE: 86 MMHG | OXYGEN SATURATION: 99 % | HEIGHT: 68 IN | WEIGHT: 175 LBS

## 2021-03-08 DIAGNOSIS — R42 DIZZINESS: ICD-10-CM

## 2021-03-08 DIAGNOSIS — R42 VERTIGO: ICD-10-CM

## 2021-03-08 DIAGNOSIS — M79.10 MYALGIA: ICD-10-CM

## 2021-03-08 DIAGNOSIS — R05.9 COUGH: ICD-10-CM

## 2021-03-08 DIAGNOSIS — Z20.822 ENCOUNTER FOR LABORATORY TESTING FOR COVID-19 VIRUS: Primary | ICD-10-CM

## 2021-03-08 DIAGNOSIS — R09.82 POST-NASAL DRIP: ICD-10-CM

## 2021-03-08 LAB
GLUCOSE BLDC GLUCOMTR-MCNC: 112 MG/DL (ref 70–99)
SARS-COV-2 RNA RESP QL NAA+PROBE: NOT DETECTED

## 2021-03-08 PROCEDURE — 71046 X-RAY EXAM CHEST 2 VIEWS: CPT | Performed by: EMERGENCY MEDICINE

## 2021-03-08 PROCEDURE — 99203 OFFICE O/P NEW LOW 30 MIN: CPT | Performed by: EMERGENCY MEDICINE

## 2021-03-08 PROCEDURE — 82962 GLUCOSE BLOOD TEST: CPT | Performed by: EMERGENCY MEDICINE

## 2021-03-08 PROCEDURE — U0002 COVID-19 LAB TEST NON-CDC: HCPCS | Performed by: EMERGENCY MEDICINE

## 2021-03-08 PROCEDURE — 93000 ELECTROCARDIOGRAM COMPLETE: CPT | Performed by: EMERGENCY MEDICINE

## 2021-03-08 RX ORDER — MECLIZINE HYDROCHLORIDE 25 MG/1
25 TABLET ORAL ONCE
Status: COMPLETED | OUTPATIENT
Start: 2021-03-08 | End: 2021-03-08

## 2021-03-08 RX ORDER — BENZONATATE 100 MG/1
100 CAPSULE ORAL 3 TIMES DAILY PRN
Qty: 20 CAPSULE | Refills: 0 | Status: SHIPPED | OUTPATIENT
Start: 2021-03-08 | End: 2021-04-07

## 2021-03-08 RX ORDER — MECLIZINE HYDROCHLORIDE 25 MG/1
25 TABLET ORAL 3 TIMES DAILY PRN
Qty: 20 TABLET | Refills: 0 | Status: SHIPPED | OUTPATIENT
Start: 2021-03-08 | End: 2021-04-07

## 2021-03-09 DIAGNOSIS — Z23 NEED FOR VACCINATION: ICD-10-CM

## 2021-03-09 NOTE — ED PROVIDER NOTES
Patient Seen in: [facility]      History   Patient presents with:  Covid-19 Test    Stated Complaint: TL to IC: called in by daughter, patient with COVID exposure, cough and fatigue*    HPI/Subjective:   Karie Velez is a 76year old Eyes:      Conjunctiva/sclera: Conjunctivae normal.      Pupils: Pupils are equal, round, and reactive to light. Cardiovascular:      Rate and Rhythm: Normal rate. Pulses: Normal pulses.    Pulmonary:      Effort: Pulmonary effort is normal.      B comfortable taking her home. She is in no acute distress, and cleared for discharge home. Medical Record Review/reassessment: I reviewed available prior medical records for any recent pertinent discharge summaries/testing.  I Updated patient  on all fin

## 2021-03-10 LAB — SARS-COV-2 RNA RESP QL NAA+PROBE: NOT DETECTED

## 2021-03-12 ENCOUNTER — LAB ENCOUNTER (OUTPATIENT)
Dept: LAB | Facility: HOSPITAL | Age: 66
End: 2021-03-12
Attending: INTERNAL MEDICINE
Payer: MEDICARE

## 2021-03-12 DIAGNOSIS — Z20.822 EXPOSURE TO COVID-19 VIRUS: ICD-10-CM

## 2021-03-13 LAB — SARS-COV-2 RNA RESP QL NAA+PROBE: NOT DETECTED

## 2021-03-22 DIAGNOSIS — E55.9 VITAMIN D DEFICIENCY: ICD-10-CM

## 2021-03-23 RX ORDER — ERGOCALCIFEROL 1.25 MG/1
CAPSULE ORAL
Qty: 4 CAPSULE | Refills: 3 | Status: SHIPPED | OUTPATIENT
Start: 2021-03-23

## 2021-03-28 ENCOUNTER — IMMUNIZATION (OUTPATIENT)
Dept: LAB | Age: 66
End: 2021-03-28
Attending: HOSPITALIST
Payer: MEDICARE

## 2021-03-28 DIAGNOSIS — Z23 NEED FOR VACCINATION: Primary | ICD-10-CM

## 2021-03-28 PROCEDURE — 0001A SARSCOV2 VAC 30MCG/0.3ML IM: CPT

## 2021-04-01 ENCOUNTER — TELEPHONE (OUTPATIENT)
Dept: INTERNAL MEDICINE CLINIC | Facility: CLINIC | Age: 66
End: 2021-04-01

## 2021-04-01 RX ORDER — AMIODARONE HYDROCHLORIDE 200 MG/1
TABLET ORAL
Qty: 90 TABLET | Refills: 0 | OUTPATIENT
Start: 2021-04-01

## 2021-04-01 RX ORDER — AMIODARONE HYDROCHLORIDE 200 MG/1
100 TABLET ORAL 2 TIMES DAILY
Qty: 45 TABLET | Refills: 0 | Status: SHIPPED | OUTPATIENT
Start: 2021-04-01 | End: 2021-05-17

## 2021-04-01 NOTE — TELEPHONE ENCOUNTER
Patti from [facility] stated pt needs a refill on    amiodarone HCl 200 MG Oral Tab   2020     Si mg.  Half tablet twice a day           Thank you

## 2021-04-01 NOTE — TELEPHONE ENCOUNTER
Reviewed note noted that she was on this in [address]  Please do remind her that this should actually come from her cardiologist Dr. Andressa Dela Cruz in case it does need dose adjustments etc.

## 2021-04-01 NOTE — TELEPHONE ENCOUNTER
Dr Jeannette Egan, patient requesting amiodarone not previously prescribed at [facility]  Please reply to pool: EM TRIAGE SUPPORT

## 2021-04-05 NOTE — TELEPHONE ENCOUNTER
Current Outpatient Medications:   •  NP THYROID 60 MG Oral Tab, TK 1 T PO IN THE MORNING, Disp: , Rfl:

## 2021-04-06 ENCOUNTER — LAB ENCOUNTER (OUTPATIENT)
Dept: LAB | Facility: HOSPITAL | Age: 66
End: 2021-04-06
Attending: INTERNAL MEDICINE
Payer: MEDICARE

## 2021-04-06 DIAGNOSIS — E03.9 HYPOTHYROIDISM, UNSPECIFIED TYPE: Chronic | ICD-10-CM

## 2021-04-06 DIAGNOSIS — E55.9 VITAMIN D DEFICIENCY: ICD-10-CM

## 2021-04-06 PROCEDURE — 36415 COLL VENOUS BLD VENIPUNCTURE: CPT

## 2021-04-06 PROCEDURE — 82306 VITAMIN D 25 HYDROXY: CPT

## 2021-04-06 PROCEDURE — 84443 ASSAY THYROID STIM HORMONE: CPT

## 2021-04-06 RX ORDER — LEVOTHYROXINE, LIOTHYRONINE 38; 9 UG/1; UG/1
TABLET ORAL
Qty: 90 TABLET | Refills: 0 | Status: SHIPPED | OUTPATIENT
Start: 2021-04-06 | End: 2021-06-28

## 2021-04-07 ENCOUNTER — OFFICE VISIT (OUTPATIENT)
Dept: INTERNAL MEDICINE CLINIC | Facility: CLINIC | Age: 66
End: 2021-04-07
Payer: MEDICARE

## 2021-04-07 VITALS
HEIGHT: 68 IN | HEART RATE: 46 BPM | DIASTOLIC BLOOD PRESSURE: 70 MMHG | WEIGHT: 170 LBS | BODY MASS INDEX: 25.76 KG/M2 | SYSTOLIC BLOOD PRESSURE: 120 MMHG

## 2021-04-07 DIAGNOSIS — I10 ESSENTIAL HYPERTENSION: ICD-10-CM

## 2021-04-07 DIAGNOSIS — R13.10 DYSPHAGIA, UNSPECIFIED TYPE: Primary | ICD-10-CM

## 2021-04-07 DIAGNOSIS — R00.1 BRADYCARDIA: ICD-10-CM

## 2021-04-07 DIAGNOSIS — K21.9 GASTROESOPHAGEAL REFLUX DISEASE, UNSPECIFIED WHETHER ESOPHAGITIS PRESENT: ICD-10-CM

## 2021-04-07 DIAGNOSIS — I48.91 ATRIAL FIBRILLATION, UNSPECIFIED TYPE (HCC): ICD-10-CM

## 2021-04-07 DIAGNOSIS — Z72.820 POOR SLEEP: ICD-10-CM

## 2021-04-07 PROCEDURE — 99214 OFFICE O/P EST MOD 30 MIN: CPT | Performed by: INTERNAL MEDICINE

## 2021-04-07 RX ORDER — AMLODIPINE BESYLATE 5 MG/1
TABLET ORAL
Qty: 180 TABLET | Refills: 3 | Status: SHIPPED | OUTPATIENT
Start: 2021-04-07 | End: 2021-09-24

## 2021-04-07 RX ORDER — TRAZODONE HYDROCHLORIDE 100 MG/1
100 TABLET ORAL NIGHTLY
Qty: 90 TABLET | Refills: 1 | Status: SHIPPED | OUTPATIENT
Start: 2021-04-07 | End: 2021-06-25

## 2021-04-07 RX ORDER — PANTOPRAZOLE SODIUM 40 MG/1
40 TABLET, DELAYED RELEASE ORAL
Qty: 30 TABLET | Refills: 2 | Status: SHIPPED | OUTPATIENT
Start: 2021-04-07 | End: 2021-06-28

## 2021-04-07 NOTE — PROGRESS NOTES
Kita Malik is a 76year old female.   Patient presents with:  Chest Pain: mid area, 1 year   HTN      HPI:   Patient comes for follow-up--her with daughter   C/C chest pain  C/o noted last mn normal ekg   Feels like something some discomfort when atorvastatin 10 MG Oral Tab Take 1 tablet (10 mg total) by mouth nightly.  90 tablet 3   • XARELTO 20 MG Oral Tab TAKE 1 TABLET(20 MG) BY MOUTH DAILY WITH FOOD 30 tablet 3      Past Medical History:   Diagnosis Date   • Anesthesia complication    • Anxiety comes for allergy to contrast , will refer to GI for endoscopy, noted she has lost weight--she was 186 July 2020, then 175 March 2021 and today 170    Gastroesophageal reflux disease, unspecified whether esophagitis present  -     GASTRO - INTERNAL  -

## 2021-04-23 ENCOUNTER — IMMUNIZATION (OUTPATIENT)
Dept: LAB | Age: 66
End: 2021-04-23
Attending: HOSPITALIST
Payer: MEDICARE

## 2021-04-23 ENCOUNTER — OFFICE VISIT (OUTPATIENT)
Dept: CARDIOLOGY CLINIC | Facility: CLINIC | Age: 66
End: 2021-04-23
Payer: MEDICARE

## 2021-04-23 VITALS
WEIGHT: 167 LBS | HEIGHT: 68 IN | HEART RATE: 54 BPM | SYSTOLIC BLOOD PRESSURE: 110 MMHG | RESPIRATION RATE: 18 BRPM | BODY MASS INDEX: 25.31 KG/M2 | DIASTOLIC BLOOD PRESSURE: 69 MMHG

## 2021-04-23 DIAGNOSIS — I48.92 ATRIAL FIBRILLATION AND FLUTTER (HCC): Primary | ICD-10-CM

## 2021-04-23 DIAGNOSIS — I48.91 ATRIAL FIBRILLATION AND FLUTTER (HCC): Primary | ICD-10-CM

## 2021-04-23 DIAGNOSIS — Z23 NEED FOR VACCINATION: Primary | ICD-10-CM

## 2021-04-23 PROCEDURE — 99214 OFFICE O/P EST MOD 30 MIN: CPT | Performed by: NURSE PRACTITIONER

## 2021-04-23 PROCEDURE — 0002A SARSCOV2 VAC 30MCG/0.3ML IM: CPT

## 2021-04-23 NOTE — PROGRESS NOTES
Андрей Cervantes is a 76year old female. Patient presents with: Follow - Up  Hypertension  Surgical/procedure Clearance: 4/28/21 endoscopy [facility] Dr Evens Barba    HPI:   Patient comes in today for follow-up.  She sees Dr. Elva Hernandez she has a histor Oral Tab Take 1 tablet (10 mg total) by mouth nightly.  90 tablet 3   • XARELTO 20 MG Oral Tab TAKE 1 TABLET(20 MG) BY MOUTH DAILY WITH FOOD 30 tablet 3      Past Medical History:   Diagnosis Date   • Anesthesia complication    • Anxiety    • Atrial fibrill would like to decrease her metoprolol to 12.5 twice a day we can also try spreading out her amlodipine 5 mg twice a day to see if her blood pressures are more stable throughout the day.   She should follow-up with Dr. Michelle Griffith in June sooner if needed     The

## 2021-04-23 NOTE — PATIENT INSTRUCTIONS
1. Decrease metoprolol to 12.5mg twice a day  2. Take amlodipine 1 in AM and 1 PM  3. May hold xarelto for 2 days prior to endoscopy  4.  See Dr. Joel Barlow in [place]

## 2021-04-29 RX ORDER — RIVAROXABAN 20 MG/1
TABLET, FILM COATED ORAL
Qty: 30 TABLET | Refills: 3 | Status: SHIPPED | OUTPATIENT
Start: 2021-04-29 | End: 2021-09-26

## 2021-05-17 RX ORDER — AMIODARONE HYDROCHLORIDE 200 MG/1
100 TABLET ORAL 2 TIMES DAILY
Qty: 45 TABLET | Refills: 0 | Status: SHIPPED | OUTPATIENT
Start: 2021-05-17 | End: 2021-05-18

## 2021-05-18 RX ORDER — AMIODARONE HYDROCHLORIDE 200 MG/1
TABLET ORAL
Qty: 90 TABLET | Refills: 0 | Status: SHIPPED | OUTPATIENT
Start: 2021-05-18 | End: 2021-06-24

## 2021-05-19 NOTE — TELEPHONE ENCOUNTER
Last read by Addie Ortiz at 5:55 PM on 5/17/2021.
Pt is out
Refilled but please asked patient to follow-up with a cardiologist
[employer] message sent to patient, please verify it is viewed. If not, please call patient with the update.
100

## 2021-06-24 RX ORDER — AMIODARONE HYDROCHLORIDE 200 MG/1
TABLET ORAL
Qty: 90 TABLET | Refills: 0 | Status: SHIPPED | OUTPATIENT
Start: 2021-06-24 | End: 2021-06-25

## 2021-06-25 ENCOUNTER — OFFICE VISIT (OUTPATIENT)
Dept: CARDIOLOGY CLINIC | Facility: CLINIC | Age: 66
End: 2021-06-25
Payer: MEDICARE

## 2021-06-25 VITALS
HEART RATE: 50 BPM | BODY MASS INDEX: 24.73 KG/M2 | OXYGEN SATURATION: 96 % | SYSTOLIC BLOOD PRESSURE: 106 MMHG | WEIGHT: 163.19 LBS | DIASTOLIC BLOOD PRESSURE: 54 MMHG | HEIGHT: 68 IN

## 2021-06-25 DIAGNOSIS — E78.2 MIXED HYPERLIPIDEMIA: Chronic | ICD-10-CM

## 2021-06-25 DIAGNOSIS — I48.0 PAROXYSMAL ATRIAL FIBRILLATION (HCC): Primary | Chronic | ICD-10-CM

## 2021-06-25 DIAGNOSIS — I48.0 PAROXYSMAL A-FIB (HCC): ICD-10-CM

## 2021-06-25 PROCEDURE — 99214 OFFICE O/P EST MOD 30 MIN: CPT | Performed by: INTERNAL MEDICINE

## 2021-06-25 RX ORDER — AMIODARONE HYDROCHLORIDE 200 MG/1
100 TABLET ORAL 2 TIMES DAILY
Qty: 90 TABLET | Refills: 3 | Status: SHIPPED | OUTPATIENT
Start: 2021-06-25 | End: 2021-09-24

## 2021-06-25 RX ORDER — TRAZODONE HYDROCHLORIDE 50 MG/1
TABLET ORAL
COMMUNITY
Start: 2021-05-05 | End: 2021-08-17

## 2021-06-25 NOTE — PATIENT INSTRUCTIONS
Continue current medications. Bring all your medications to the next office visit. Follow-up with me in 3 months or sooner if needed.

## 2021-06-25 NOTE — PROGRESS NOTES
Sherri Johnson is a 78year old female. Patient presents with: Follow - Up: PAF, Denies chest pain, SOB, feels some dizziness    HPI:   Patient is here for follow-up appointment.   She is feeling much better with medical adjustments to her blood press Vaping Use      Vaping Use: Never used    Alcohol use: Not Currently    Drug use: Never       REVIEW OF SYSTEMS:   GENERAL HEALTH: feels well otherwise  SKIN: denies any unusual skin lesions or rashes  RESPIRATORY: denies shortness of breath with exertion

## 2021-06-28 DIAGNOSIS — K21.9 GASTROESOPHAGEAL REFLUX DISEASE, UNSPECIFIED WHETHER ESOPHAGITIS PRESENT: ICD-10-CM

## 2021-06-28 RX ORDER — LEVOTHYROXINE, LIOTHYRONINE 38; 9 UG/1; UG/1
TABLET ORAL
Qty: 90 TABLET | Refills: 0 | Status: SHIPPED | OUTPATIENT
Start: 2021-06-28 | End: 2021-10-14

## 2021-06-28 RX ORDER — PANTOPRAZOLE SODIUM 40 MG/1
TABLET, DELAYED RELEASE ORAL
Qty: 30 TABLET | Refills: 2 | Status: SHIPPED | OUTPATIENT
Start: 2021-06-28 | End: 2021-07-28

## 2021-06-28 RX ORDER — PANTOPRAZOLE SODIUM 40 MG/1
TABLET, DELAYED RELEASE ORAL
Qty: 30 TABLET | Refills: 2 | Status: SHIPPED | OUTPATIENT
Start: 2021-06-28 | End: 2021-07-21

## 2021-07-21 ENCOUNTER — OFFICE VISIT (OUTPATIENT)
Dept: INTERNAL MEDICINE CLINIC | Facility: CLINIC | Age: 66
End: 2021-07-21
Payer: MEDICARE

## 2021-07-21 VITALS
SYSTOLIC BLOOD PRESSURE: 153 MMHG | DIASTOLIC BLOOD PRESSURE: 71 MMHG | WEIGHT: 164.81 LBS | HEART RATE: 65 BPM | BODY MASS INDEX: 24.98 KG/M2 | HEIGHT: 68 IN

## 2021-07-21 DIAGNOSIS — I10 ESSENTIAL HYPERTENSION: ICD-10-CM

## 2021-07-21 DIAGNOSIS — M17.0 PRIMARY OSTEOARTHRITIS OF BOTH KNEES: ICD-10-CM

## 2021-07-21 DIAGNOSIS — I48.92 ATRIAL FIBRILLATION AND FLUTTER (HCC): ICD-10-CM

## 2021-07-21 DIAGNOSIS — I48.0 PAROXYSMAL ATRIAL FIBRILLATION (HCC): Chronic | ICD-10-CM

## 2021-07-21 DIAGNOSIS — M17.12 PRIMARY OSTEOARTHRITIS OF LEFT KNEE: ICD-10-CM

## 2021-07-21 DIAGNOSIS — Z11.59 NEED FOR HEPATITIS C SCREENING TEST: ICD-10-CM

## 2021-07-21 DIAGNOSIS — J30.9 ALLERGIC RHINITIS, UNSPECIFIED SEASONALITY, UNSPECIFIED TRIGGER: ICD-10-CM

## 2021-07-21 DIAGNOSIS — Z12.31 SCREENING MAMMOGRAM, ENCOUNTER FOR: ICD-10-CM

## 2021-07-21 DIAGNOSIS — E78.2 MIXED HYPERLIPIDEMIA: Chronic | ICD-10-CM

## 2021-07-21 DIAGNOSIS — R10.9 NONSPECIFIC ABDOMINAL PAIN: ICD-10-CM

## 2021-07-21 DIAGNOSIS — Z78.0 ASYMPTOMATIC MENOPAUSAL STATE: ICD-10-CM

## 2021-07-21 DIAGNOSIS — R05.3 CHRONIC COUGH: ICD-10-CM

## 2021-07-21 DIAGNOSIS — Z00.00 ENCOUNTER FOR ANNUAL HEALTH EXAMINATION: Primary | ICD-10-CM

## 2021-07-21 DIAGNOSIS — E03.9 HYPOTHYROIDISM, UNSPECIFIED TYPE: Chronic | ICD-10-CM

## 2021-07-21 DIAGNOSIS — Z91.89 AT RISK FOR DECREASED BONE DENSITY: ICD-10-CM

## 2021-07-21 DIAGNOSIS — F41.9 ANXIETY: ICD-10-CM

## 2021-07-21 DIAGNOSIS — I48.91 ATRIAL FIBRILLATION AND FLUTTER (HCC): ICD-10-CM

## 2021-07-21 DIAGNOSIS — M18.0 OSTEOARTHRITIS OF CARPOMETACARPAL (CMC) JOINTS OF BOTH THUMBS, UNSPECIFIED OSTEOARTHRITIS TYPE: ICD-10-CM

## 2021-07-21 DIAGNOSIS — I82.452 ACUTE DEEP VEIN THROMBOSIS (DVT) OF LEFT PERONEAL VEIN (HCC): ICD-10-CM

## 2021-07-21 PROCEDURE — G0438 PPPS, INITIAL VISIT: HCPCS | Performed by: INTERNAL MEDICINE

## 2021-07-21 RX ORDER — FLUTICASONE PROPIONATE 50 MCG
2 SPRAY, SUSPENSION (ML) NASAL DAILY
Qty: 1 EACH | Refills: 0 | Status: SHIPPED | OUTPATIENT
Start: 2021-07-21 | End: 2021-07-23

## 2021-07-21 RX ORDER — CELECOXIB 200 MG/1
200 CAPSULE ORAL DAILY PRN
Qty: 10 CAPSULE | Refills: 3 | Status: SHIPPED | OUTPATIENT
Start: 2021-07-21 | End: 2022-07-16

## 2021-07-21 RX ORDER — PREDNISONE 20 MG/1
20 TABLET ORAL DAILY
Qty: 5 TABLET | Refills: 0 | Status: SHIPPED | OUTPATIENT
Start: 2021-07-21 | End: 2021-07-26

## 2021-07-21 RX ORDER — ALPRAZOLAM 0.5 MG/1
0.5 TABLET ORAL
Qty: 30 TABLET | Refills: 0 | Status: SHIPPED | OUTPATIENT
Start: 2021-07-21 | End: 2021-10-14

## 2021-07-22 ENCOUNTER — MED REC SCAN ONLY (OUTPATIENT)
Dept: INTERNAL MEDICINE CLINIC | Facility: CLINIC | Age: 66
End: 2021-07-22

## 2021-07-22 NOTE — PATIENT INSTRUCTIONS
Estela Álvarez's SCREENING SCHEDULE   Tests on this list are recommended by your physician but may not be covered, or covered at this frequency, by your insurer. Please check with your insurance carrier before scheduling to verify coverage.    PRE Pap and Pelvic    Pap   Covered every 2 years for women at normal risk;  Annually if at high risk -  No recommendations at this time    Chlamydia Annually if high risk -  No recommendations at this time   Screening Mammogram    Mammogram     Recommend cathryn

## 2021-07-22 NOTE — PROGRESS NOTES
HPI:   Liane Bean is a 78year old female who presents for a Medicare Initial Annual Wellness visit (Once after 12 month Medicare anniversary) .       Pt comes fo rher first medicare physical   C/c bilateral thumb pains and would like Celebrex 2 screening of functional status.    Vision Problems? : Yes       Depression Screening (PHQ-2/PHQ-9): Over the LAST 2 WEEKS   Little interest or pleasure in doing things: Not at all  Feeling down, depressed, or hopeless: Several days  PHQ-2 SCORE: 1      Adva 88 07/08/2020        CBC  (most recent labs)   Lab Results   Component Value Date    WBC 5.1 07/17/2020    HGB 12.7 07/17/2020    .0 07/17/2020        ALLERGIES:   She is allergic to radiology contrast iodinated dyes.     CURRENT MEDICATIONS:   [facility] family history includes Cancer in her mother; Heart Disorder in her father. SOCIAL HISTORY:   She  reports that she has never smoked. She has never used smokeless tobacco. She reports previous alcohol use. She reports that she does not use drugs.      REV words in a sentence and need to ask people to repeat themselves: No   I especially have trouble understanding the speech of women and children: No I have trouble understanding the speaker in a large room such as at a meeting or place of Christian: No   Many Tab; Take 1 tablet (0.5 mg total) by mouth daily as needed for Anxiety.   Stable on meds rn -refilled   Osteoarthritis of carpometacarpal (CMC) joints of both thumbs, unspecified osteoarthritis type  -     XR FINGER(S) (MIN 2 VIEWS), LEFT THUMB (CPT=73472); Discontinue: Fluticasone Propionate 50 MCG/ACT Nasal Suspension; 2 sprays by Each Nare route daily.      Take Zyrtec or Xyzal once a DAY as NEEDED     Diet assessment: good     PLAN:  The patient indicates understanding of these issues and agrees to the roosevelt Aneurysm (AAA) Covered once in a lifetime for one of the following risk factors   • Men who are 73-68 years old and have ever smoked   • Anyone with a family history -     Colorectal Cancer Screening  Covered for ages 52-80; only need ONE of the following: prescription benefits -  Zoster Vaccines(1 of 2) Never done

## 2021-07-23 RX ORDER — FLUTICASONE PROPIONATE 50 MCG
SPRAY, SUSPENSION (ML) NASAL
Qty: 48 G | Refills: 0 | Status: SHIPPED | OUTPATIENT
Start: 2021-07-23

## 2021-07-28 DIAGNOSIS — K21.9 GASTROESOPHAGEAL REFLUX DISEASE, UNSPECIFIED WHETHER ESOPHAGITIS PRESENT: ICD-10-CM

## 2021-07-28 RX ORDER — PANTOPRAZOLE SODIUM 40 MG/1
TABLET, DELAYED RELEASE ORAL
Qty: 90 TABLET | Refills: 0 | Status: SHIPPED | OUTPATIENT
Start: 2021-07-28 | End: 2021-11-12

## 2021-08-17 RX ORDER — TRAZODONE HYDROCHLORIDE 100 MG/1
100 TABLET ORAL NIGHTLY
Qty: 90 TABLET | Refills: 1 | Status: SHIPPED | OUTPATIENT
Start: 2021-08-17

## 2021-08-17 NOTE — TELEPHONE ENCOUNTER
Trazodone 50 mg being requested by pharmacy is only in chart as historical; previously prescribed by Dr Omar Calloway as trazodone 100 mg nightly--was discontinued from chart by Dr Georges Rater on 6/25/21 (no reason noted).      Patient contacted who states she is t

## 2021-08-19 DIAGNOSIS — E55.9 VITAMIN D DEFICIENCY: Primary | ICD-10-CM

## 2021-08-19 DIAGNOSIS — E55.9 VITAMIN D DEFICIENCY: ICD-10-CM

## 2021-08-19 RX ORDER — ERGOCALCIFEROL 1.25 MG/1
CAPSULE ORAL
Qty: 4 CAPSULE | Refills: 3 | OUTPATIENT
Start: 2021-08-19

## 2021-08-20 NOTE — TELEPHONE ENCOUNTER
Patient has pending blood work–I will order vitamin D level in addition to what she has already ordered so she can get all of this done together

## 2021-08-25 DIAGNOSIS — R05.3 CHRONIC COUGH: ICD-10-CM

## 2021-08-25 RX ORDER — PREDNISONE 20 MG/1
TABLET ORAL
Qty: 5 TABLET | Refills: 0 | OUTPATIENT
Start: 2021-08-25

## 2021-08-29 DIAGNOSIS — E55.9 VITAMIN D DEFICIENCY: ICD-10-CM

## 2021-08-30 DIAGNOSIS — E55.9 VITAMIN D DEFICIENCY: ICD-10-CM

## 2021-08-30 NOTE — TELEPHONE ENCOUNTER
[employer] message sent to complete labs        Please review. Protocol failed / No Protocol.     Repeat labs not completed as of 8/30/21    Requested Prescriptions   Pending Prescriptions Disp Refills    ERGOCALCIFEROL 1.25 MG (26056 UT) Oral Cap [Pharmacy Me

## 2021-08-31 RX ORDER — ERGOCALCIFEROL 1.25 MG/1
50000 CAPSULE ORAL WEEKLY
Qty: 4 CAPSULE | Refills: 3 | OUTPATIENT
Start: 2021-08-31

## 2021-08-31 RX ORDER — ERGOCALCIFEROL 1.25 MG/1
CAPSULE ORAL
Qty: 4 CAPSULE | Refills: 3 | OUTPATIENT
Start: 2021-08-31

## 2021-08-31 NOTE — TELEPHONE ENCOUNTER
The patient did not read her [employer] message. I talked to the patient and the daughter and informed.   They will have completed next week for the patient did not fast.

## 2021-09-17 ENCOUNTER — HOSPITAL ENCOUNTER (OUTPATIENT)
Dept: ULTRASOUND IMAGING | Facility: HOSPITAL | Age: 66
Discharge: HOME | End: 2021-09-17
Attending: INTERNAL MEDICINE
Payer: MEDICARE

## 2021-09-17 DIAGNOSIS — R10.9 NONSPECIFIC ABDOMINAL PAIN: ICD-10-CM

## 2021-09-17 PROCEDURE — 76705 ECHO EXAM OF ABDOMEN: CPT | Performed by: INTERNAL MEDICINE

## 2021-09-24 ENCOUNTER — OFFICE VISIT (OUTPATIENT)
Dept: CARDIOLOGY CLINIC | Facility: CLINIC | Age: 66
End: 2021-09-24
Payer: MEDICARE

## 2021-09-24 VITALS
HEIGHT: 68 IN | DIASTOLIC BLOOD PRESSURE: 72 MMHG | HEART RATE: 71 BPM | SYSTOLIC BLOOD PRESSURE: 139 MMHG | WEIGHT: 160 LBS | BODY MASS INDEX: 24.25 KG/M2

## 2021-09-24 DIAGNOSIS — I10 ESSENTIAL HYPERTENSION: ICD-10-CM

## 2021-09-24 DIAGNOSIS — I48.0 PAROXYSMAL ATRIAL FIBRILLATION (HCC): Primary | ICD-10-CM

## 2021-09-24 DIAGNOSIS — E78.2 MIXED HYPERLIPIDEMIA: ICD-10-CM

## 2021-09-24 DIAGNOSIS — I83.813 VARICOSE VEINS OF BOTH LOWER EXTREMITIES WITH PAIN: ICD-10-CM

## 2021-09-24 PROBLEM — E78.5 DYSLIPIDEMIA: Status: ACTIVE | Noted: 2020-07-17

## 2021-09-24 PROCEDURE — 99214 OFFICE O/P EST MOD 30 MIN: CPT | Performed by: INTERNAL MEDICINE

## 2021-09-24 RX ORDER — AMIODARONE HYDROCHLORIDE 200 MG/1
100 TABLET ORAL DAILY
Qty: 90 TABLET | Refills: 3 | Status: SHIPPED | OUTPATIENT
Start: 2021-09-24

## 2021-09-24 RX ORDER — LOSARTAN POTASSIUM 25 MG/1
25 TABLET ORAL DAILY
Qty: 30 TABLET | Refills: 3 | Status: SHIPPED | OUTPATIENT
Start: 2021-09-24

## 2021-09-24 RX ORDER — AMLODIPINE BESYLATE 5 MG/1
5 TABLET ORAL DAILY
Qty: 90 TABLET | Refills: 3 | Status: SHIPPED | OUTPATIENT
Start: 2021-09-24

## 2021-09-24 NOTE — PROGRESS NOTES
Андрей Cervantes is a 78year old female. Patient presents with: Follow - Up: 3 mo  Atrial Fibrillation  Edema: feet, ankles    HPI:   Patient is here for a follow-up appointment. She is doing well cardiac standpoint.   Blood pressure is not well contr Atrial fibrillation ([facility] [address]    • Back problem    • Deep vein thrombosis ([facility] [address] 2018    right leg   • Disorder of thyroid    • Essential hypertension    • Hyperlipidemia    • Hypothyroidism    • Osteoarthritis    • PONV (postoperative nausea and vomiting)    • understanding of these issues and agrees to the plan. The patient is asked to return in 4 weeks or sooner if needed.              Erica Hood MD  9/24/2021  4:22 PM

## 2021-09-24 NOTE — PATIENT INSTRUCTIONS
Decrease amiodarone to 100 mg once a day. Decrease amlodipine to 5 mg once a day. Start losartan 25 mg once a day. Follow through with vein studies within next few days. Follow-up with me in 4 weeks or sooner if needed.

## 2021-09-26 RX ORDER — RIVAROXABAN 20 MG/1
TABLET, FILM COATED ORAL
Qty: 30 TABLET | Refills: 3 | Status: SHIPPED | OUTPATIENT
Start: 2021-09-26 | End: 2022-01-24

## 2021-09-26 NOTE — TELEPHONE ENCOUNTER
Please review Protocol Failed/No Protocol        Requested Prescriptions   Pending Prescriptions Disp Refills    XARELTO 20 MG Oral Tab [Pharmacy Med Name: XARELTO 20MG TABLETS] 30 tablet 3     Sig: TAKE 1 TABLET(20 MG) BY MOUTH DAILY WITH FOOD        Ther

## 2021-10-14 DIAGNOSIS — F41.9 ANXIETY: ICD-10-CM

## 2021-10-14 RX ORDER — LEVOTHYROXINE, LIOTHYRONINE 38; 9 UG/1; UG/1
TABLET ORAL
Qty: 90 TABLET | Refills: 0 | Status: SHIPPED | OUTPATIENT
Start: 2021-10-14

## 2021-10-14 RX ORDER — ALPRAZOLAM 0.5 MG/1
TABLET ORAL
Qty: 30 TABLET | Refills: 0 | Status: SHIPPED | OUTPATIENT
Start: 2021-10-14 | End: 2022-01-10

## 2021-10-14 NOTE — TELEPHONE ENCOUNTER
Refill passed per [facility], [facility] protocol.     Requested Prescriptions   Pending Prescriptions Disp Refills    ALPRAZOLAM 0.5 MG Oral Tab [Pharmacy Med Name: ALPRAZOLAM 0.5MG TABLETS] 30 tablet 0     Sig: TAKE 1 TABLET BY MOUTH DAILY AS NEEDED        There

## 2021-11-12 DIAGNOSIS — K21.9 GASTROESOPHAGEAL REFLUX DISEASE, UNSPECIFIED WHETHER ESOPHAGITIS PRESENT: ICD-10-CM

## 2021-11-12 RX ORDER — PANTOPRAZOLE SODIUM 40 MG/1
40 TABLET, DELAYED RELEASE ORAL
Qty: 90 TABLET | Refills: 1 | Status: SHIPPED | OUTPATIENT
Start: 2021-11-12 | End: 2022-03-30

## 2021-11-12 NOTE — TELEPHONE ENCOUNTER
Refill passed per [place] [employer], [facility] protocol.     Requested Prescriptions   Pending Prescriptions Disp Refills    PANTOPRAZOLE 40 MG Oral Tab EC [Pharmacy Med Name: PANTOPRAZOLE 40MG TABLETS] 90 tablet 0     Sig: TAKE 1 TABLET(40 MG) BY MOUTH EVERY MORNING BEFORE BREAKFAST        Gastrointestional Medication Protocol Passed - 11/12/2021  4:08 PM        Passed - Appointment in past 12 or next 3 months              Recent Outpatient Visits              1 month ago Paroxysmal atrial fibrillation [facility])    [facility] Kusum Angel MD    Office Visit    3 months ago Encounter for annual health examination    Kandice Newsome MD    Office Visit    4 months ago Paroxysmal atrial fibrillation [facility])    [facility] Kusum Angel MD    Office Visit    6 months ago Atrial fibrillation and flutter [facility])    [facility] MIHAI Tom    Office Visit    7 months ago Dysphagia, unspecified type    Bill Prince MD    Office Visit

## 2022-01-08 ENCOUNTER — IMMUNIZATION (OUTPATIENT)
Dept: LAB | Facility: HOSPITAL | Age: 67
End: 2022-01-08
Attending: EMERGENCY MEDICINE
Payer: MEDICARE

## 2022-01-08 DIAGNOSIS — Z23 NEED FOR VACCINATION: Primary | ICD-10-CM

## 2022-01-08 PROCEDURE — 0054A SARSCOV2 VAC 30MCG/0.3ML IM: CPT

## 2022-01-08 PROCEDURE — 0004A SARSCOV2 VAC 30MCG/0.3ML IM: CPT

## 2022-01-09 DIAGNOSIS — F41.9 ANXIETY: ICD-10-CM

## 2022-01-10 RX ORDER — ALPRAZOLAM 0.5 MG/1
TABLET ORAL
Qty: 30 TABLET | Refills: 0 | Status: SHIPPED | OUTPATIENT
Start: 2022-01-10

## 2022-01-24 NOTE — TELEPHONE ENCOUNTER
Please review. Protocol failed or has no protocol.     Requested Prescriptions   Pending Prescriptions Disp Refills    XARELTO 20 MG Oral Tab [Pharmacy Med Name: XARELTO 20MG TABLETS] 90 tablet 1     Sig: TAKE 1 TABLET(20 MG) BY MOUTH DAILY WITH FOOD

## 2022-03-18 NOTE — TELEPHONE ENCOUNTER
Please review. Protocol failed / No protocol.    Requested Prescriptions   Pending Prescriptions Disp Refills    ALPRAZOLAM 0.5 MG Oral Tab [Pharmacy Med Name: ALPRAZOLAM 0.5MG TABLETS] 30 tablet 0     Sig: TAKE 1 TABLET BY MOUTH DAILY AS NEEDED        There is no refill protocol information for this order          Recent Outpatient Visits              5 months ago Paroxysmal atrial fibrillation [facility])    [facility] Eva Casper MD    Office Visit    8 months ago Encounter for annual health examination    Christopher Pineda MD    Office Visit    8 months ago Paroxysmal atrial fibrillation [facility])    [facility] Eva Casper MD    Office Visit    10 months ago Atrial fibrillation and flutter [facility])    [facility] MIHAI Vasquez    Office Visit    11 months ago Dysphagia, unspecified type    Britney Caruso MD    Office Visit

## 2022-03-19 RX ORDER — ALPRAZOLAM 0.5 MG/1
TABLET ORAL
Qty: 30 TABLET | Refills: 0 | Status: SHIPPED | OUTPATIENT
Start: 2022-03-19

## 2022-03-30 RX ORDER — ATORVASTATIN CALCIUM 10 MG/1
TABLET, FILM COATED ORAL
Qty: 90 TABLET | Refills: 3 | Status: SHIPPED | OUTPATIENT
Start: 2022-03-30

## 2022-03-30 RX ORDER — PANTOPRAZOLE SODIUM 40 MG/1
TABLET, DELAYED RELEASE ORAL
Qty: 90 TABLET | Refills: 1 | Status: SHIPPED | OUTPATIENT
Start: 2022-03-30

## 2022-03-30 NOTE — TELEPHONE ENCOUNTER
Please review.  Protocol Failed or has no Protocol  Requested Prescriptions   Pending Prescriptions Disp Refills    ATORVASTATIN 10 MG Oral Tab [Pharmacy Med Name: ATORVASTATIN 10MG TABLETS] 90 tablet 3     Sig: TAKE 1 TABLET(10 MG) BY MOUTH EVERY NIGHT        Cholesterol Medication Protocol Failed - 3/30/2022  2:27 PM        Failed - ALT in past 12 months        Failed - LDL in past 12 months        Failed - Last ALT < 80       Lab Results   Component Value Date    ALT 35 07/08/2020             Failed - Last LDL < 130     Lab Results   Component Value Date    LDL 92 01/23/2021               Passed - Appointment in past 12 or next 3 months          Signed Prescriptions Disp Refills    PANTOPRAZOLE 40 MG Oral Tab EC 90 tablet 1     Sig: TAKE 1 TABLET(40 MG) BY MOUTH BEFORE BREAKFAST        Gastrointestional Medication Protocol Passed - 3/30/2022  1:10 PM        Passed - Appointment in past 12 or next 3 months              Recent Outpatient Visits              6 months ago Paroxysmal atrial fibrillation [facility])    [facility] Stacy Zuñiga MD    Office Visit    8 months ago Encounter for annual health examination    Rommel Shah MD    Office Visit    9 months ago Paroxysmal atrial fibrillation [facility])    [facility] Stacy Zuñiga MD    Office Visit    11 months ago Atrial fibrillation and flutter [facility])    [facility] MIHAI Lipscomb    Office Visit    11 months ago Dysphagia, unspecified type    Payal House MD    Office Visit

## 2022-03-30 NOTE — TELEPHONE ENCOUNTER
Refill passed per [place] [employer] protocol.   Requested Prescriptions   Pending Prescriptions Disp Refills    PANTOPRAZOLE 40 MG Oral Tab EC [Pharmacy Med Name: PANTOPRAZOLE 40MG TABLETS] 90 tablet 1     Sig: TAKE 1 TABLET(40 MG) BY MOUTH BEFORE BREAKFAST        Gastrointestional Medication Protocol Passed - 3/30/2022  1:10 PM        Passed - Appointment in past 12 or next 3 months           ATORVASTATIN 10 MG Oral Tab [Pharmacy Med Name: ATORVASTATIN 10MG TABLETS] 90 tablet 3     Sig: TAKE 1 TABLET(10 MG) BY MOUTH EVERY NIGHT        Cholesterol Medication Protocol Failed - 3/30/2022  1:10 PM        Failed - ALT in past 12 months        Failed - LDL in past 12 months        Failed - Last ALT < 80       Lab Results   Component Value Date    ALT 35 07/08/2020             Failed - Last LDL < 130     Lab Results   Component Value Date    LDL 92 01/23/2021               Passed - Appointment in past 12 or next 3 months              Recent Outpatient Visits              6 months ago Paroxysmal atrial fibrillation [facility])    [facility] Mikael Alves MD    Office Visit    8 months ago Encounter for annual health examination    Shivam Perry MD    Office Visit    9 months ago Paroxysmal atrial fibrillation [facility])    [facility] Mikael Alves MD    Office Visit    11 months ago Atrial fibrillation and flutter [facility])    [facility] MIHAI Ayon    Office Visit    11 months ago Dysphagia, unspecified type    Jonathon Means MD    Office Visit

## 2022-04-06 RX ORDER — CELECOXIB 200 MG/1
CAPSULE ORAL
Qty: 10 CAPSULE | Refills: 3 | Status: SHIPPED | OUTPATIENT
Start: 2022-04-06

## 2022-04-08 ENCOUNTER — TELEPHONE (OUTPATIENT)
Dept: INTERNAL MEDICINE CLINIC | Facility: CLINIC | Age: 67
End: 2022-04-08

## 2022-04-08 RX ORDER — LEVOTHYROXINE, LIOTHYRONINE 38; 9 UG/1; UG/1
60 TABLET ORAL EVERY MORNING
Qty: 90 TABLET | Refills: 0 | Status: CANCELLED | OUTPATIENT
Start: 2022-04-08

## 2022-04-30 ENCOUNTER — LAB ENCOUNTER (OUTPATIENT)
Dept: LAB | Facility: HOSPITAL | Age: 67
End: 2022-04-30
Attending: INTERNAL MEDICINE
Payer: MEDICARE

## 2022-04-30 DIAGNOSIS — Z11.59 NEED FOR HEPATITIS C SCREENING TEST: ICD-10-CM

## 2022-04-30 DIAGNOSIS — E55.9 VITAMIN D DEFICIENCY: ICD-10-CM

## 2022-04-30 DIAGNOSIS — I10 ESSENTIAL HYPERTENSION: ICD-10-CM

## 2022-04-30 LAB
ALBUMIN SERPL-MCNC: 3.6 G/DL (ref 3.4–5)
ALBUMIN/GLOB SERPL: 1.2 {RATIO} (ref 1–2)
ALP LIVER SERPL-CCNC: 63 U/L
ALT SERPL-CCNC: 20 U/L
ANION GAP SERPL CALC-SCNC: 4 MMOL/L (ref 0–18)
AST SERPL-CCNC: 25 U/L (ref 15–37)
BILIRUB SERPL-MCNC: 0.5 MG/DL (ref 0.1–2)
BUN BLD-MCNC: 20 MG/DL (ref 7–18)
BUN/CREAT SERPL: 17.9 (ref 10–20)
CALCIUM BLD-MCNC: 9.1 MG/DL (ref 8.5–10.1)
CHLORIDE SERPL-SCNC: 109 MMOL/L (ref 98–112)
CO2 SERPL-SCNC: 29 MMOL/L (ref 21–32)
CREAT BLD-MCNC: 1.12 MG/DL
DEPRECATED RDW RBC AUTO: 46.5 FL (ref 35.1–46.3)
ERYTHROCYTE [DISTWIDTH] IN BLOOD BY AUTOMATED COUNT: 13.3 % (ref 11–15)
FASTING STATUS PATIENT QL REPORTED: YES
GLOBULIN PLAS-MCNC: 3.1 G/DL (ref 2.8–4.4)
GLUCOSE BLD-MCNC: 89 MG/DL (ref 70–99)
HCT VFR BLD AUTO: 41.8 %
HCV AB SERPL QL IA: NONREACTIVE
HGB BLD-MCNC: 13.2 G/DL
MCH RBC QN AUTO: 29.8 PG (ref 26–34)
MCHC RBC AUTO-ENTMCNC: 31.6 G/DL (ref 31–37)
MCV RBC AUTO: 94.4 FL
OSMOLALITY SERPL CALC.SUM OF ELEC: 296 MOSM/KG (ref 275–295)
PLATELET # BLD AUTO: 163 10(3)UL (ref 150–450)
POTASSIUM SERPL-SCNC: 4.7 MMOL/L (ref 3.5–5.1)
PROT SERPL-MCNC: 6.7 G/DL (ref 6.4–8.2)
RBC # BLD AUTO: 4.43 X10(6)UL
SODIUM SERPL-SCNC: 142 MMOL/L (ref 136–145)
VIT D+METAB SERPL-MCNC: 29.3 NG/ML (ref 30–100)
WBC # BLD AUTO: 4.4 X10(3) UL (ref 4–11)

## 2022-04-30 PROCEDURE — 80053 COMPREHEN METABOLIC PANEL: CPT

## 2022-04-30 PROCEDURE — 36415 COLL VENOUS BLD VENIPUNCTURE: CPT

## 2022-04-30 PROCEDURE — 82306 VITAMIN D 25 HYDROXY: CPT

## 2022-04-30 PROCEDURE — 85027 COMPLETE CBC AUTOMATED: CPT

## 2022-04-30 PROCEDURE — 86803 HEPATITIS C AB TEST: CPT

## 2022-05-14 ENCOUNTER — PATIENT MESSAGE (OUTPATIENT)
Dept: INTERNAL MEDICINE CLINIC | Facility: CLINIC | Age: 67
End: 2022-05-14

## 2022-05-14 NOTE — TELEPHONE ENCOUNTER
Bhargavi Christie RN 5/14/2022 9:28 AM CDT        ----- Message -----  From: Barb Esparza  Sent: 5/14/2022 9:24 AM CDT  To: Em Rn Triage  Subject: Doni Guillermo    I intended to have my thyroid blood test completed with my previous blood draw as I need medication. But I am not sure the order was placed. Can you please place the order to check thyroid. I will make an appt to see you shortly.      Thank you    Edgar Erazo

## 2022-05-27 DIAGNOSIS — R00.1 BRADYCARDIA: ICD-10-CM

## 2022-05-27 DIAGNOSIS — I48.91 ATRIAL FIBRILLATION, UNSPECIFIED TYPE (HCC): ICD-10-CM

## 2022-05-30 NOTE — TELEPHONE ENCOUNTER
She saw cards after me and meds were adjusted - as below   Decrease amiodarone to 100 mg once a day. Decrease amlodipine to 5 mg once a day. Start losartan 25 mg once a day.  Follow through with vein studies within next few days    I dont believe she has f/u with either since then but not sure as I dont see any notes

## 2022-05-30 NOTE — TELEPHONE ENCOUNTER
DR Mccord=please clariyi if you would like patient to follow up and request the refill from her cardiology,thanks.

## 2022-05-31 NOTE — TELEPHONE ENCOUNTER
Yes, please send the refill to cardiology as they are adjusting her medications and please assist with an appointment with me

## 2022-05-31 NOTE — TELEPHONE ENCOUNTER
Dr. Valdemar Essex,     Please see Dr Elizabeth Ceballos  Note regarding this patient's cardiac  medications               Called patient with [employer]  Clif Ron ID# [id number]      Identified  patient's name and     Patient informed RX will be sent to cardiology and needs an appt. Patient needs an appt after 6pm so her daughter can drive her   Dr. Carrie Decker , when she can she be added to your schedule, probably on a Wednesday ? ?

## 2022-06-01 NOTE — TELEPHONE ENCOUNTER
1st attempt - [employer] message sent for patient to contact the office to schedule an appointment; see notes below.

## 2022-06-07 DIAGNOSIS — F41.9 ANXIETY: ICD-10-CM

## 2022-06-07 RX ORDER — ALPRAZOLAM 0.5 MG/1
TABLET ORAL
Qty: 30 TABLET | Refills: 0 | Status: SHIPPED | OUTPATIENT
Start: 2022-06-07

## 2022-07-02 ENCOUNTER — LAB ENCOUNTER (OUTPATIENT)
Dept: LAB | Facility: HOSPITAL | Age: 67
End: 2022-07-02
Attending: INTERNAL MEDICINE
Payer: MEDICARE

## 2022-07-02 DIAGNOSIS — E55.9 VITAMIN D DEFICIENCY: ICD-10-CM

## 2022-07-02 DIAGNOSIS — E78.5 DYSLIPIDEMIA: ICD-10-CM

## 2022-07-02 DIAGNOSIS — E03.8 OTHER SPECIFIED HYPOTHYROIDISM: ICD-10-CM

## 2022-07-02 LAB
CHOLEST SERPL-MCNC: 179 MG/DL (ref ?–200)
FASTING PATIENT LIPID ANSWER: YES
HDLC SERPL-MCNC: 82 MG/DL (ref 40–59)
LDLC SERPL CALC-MCNC: 84 MG/DL (ref ?–100)
NONHDLC SERPL-MCNC: 97 MG/DL (ref ?–130)
TRIGL SERPL-MCNC: 66 MG/DL (ref 30–149)
TSI SER-ACNC: 1.54 MIU/ML (ref 0.36–3.74)
VIT D+METAB SERPL-MCNC: 31.4 NG/ML (ref 30–100)
VLDLC SERPL CALC-MCNC: 10 MG/DL (ref 0–30)

## 2022-07-02 PROCEDURE — 84443 ASSAY THYROID STIM HORMONE: CPT

## 2022-07-02 PROCEDURE — 82306 VITAMIN D 25 HYDROXY: CPT

## 2022-07-02 PROCEDURE — 80061 LIPID PANEL: CPT

## 2022-07-02 PROCEDURE — 36415 COLL VENOUS BLD VENIPUNCTURE: CPT

## 2022-07-06 ENCOUNTER — OFFICE VISIT (OUTPATIENT)
Dept: INTERNAL MEDICINE CLINIC | Facility: CLINIC | Age: 67
End: 2022-07-06
Payer: MEDICARE

## 2022-07-06 VITALS
DIASTOLIC BLOOD PRESSURE: 71 MMHG | HEIGHT: 68 IN | BODY MASS INDEX: 27.28 KG/M2 | WEIGHT: 180 LBS | SYSTOLIC BLOOD PRESSURE: 129 MMHG | HEART RATE: 60 BPM

## 2022-07-06 DIAGNOSIS — I10 ESSENTIAL HYPERTENSION: Primary | ICD-10-CM

## 2022-07-06 DIAGNOSIS — E55.9 VITAMIN D DEFICIENCY: ICD-10-CM

## 2022-07-06 DIAGNOSIS — F41.9 ANXIETY: ICD-10-CM

## 2022-07-06 DIAGNOSIS — I83.813 VARICOSE VEINS OF BOTH LOWER EXTREMITIES WITH PAIN: ICD-10-CM

## 2022-07-06 DIAGNOSIS — R60.0 LOCALIZED EDEMA: ICD-10-CM

## 2022-07-06 DIAGNOSIS — E03.9 HYPOTHYROIDISM, UNSPECIFIED TYPE: ICD-10-CM

## 2022-07-06 PROBLEM — F13.20 SEDATIVE, HYPNOTIC OR ANXIOLYTIC DEPENDENCE, UNCOMPLICATED (HCC): Status: ACTIVE | Noted: 2022-07-06

## 2022-07-06 PROCEDURE — 99214 OFFICE O/P EST MOD 30 MIN: CPT | Performed by: INTERNAL MEDICINE

## 2022-07-06 RX ORDER — LOSARTAN POTASSIUM 25 MG/1
25 TABLET ORAL DAILY
Qty: 90 TABLET | Refills: 3 | Status: SHIPPED | OUTPATIENT
Start: 2022-07-06

## 2022-07-06 RX ORDER — BUSPIRONE HYDROCHLORIDE 5 MG/1
5 TABLET ORAL 2 TIMES DAILY
Qty: 180 TABLET | Refills: 3 | Status: SHIPPED | OUTPATIENT
Start: 2022-07-06 | End: 2023-07-01

## 2022-07-06 RX ORDER — ALPRAZOLAM 0.5 MG/1
0.5 TABLET ORAL DAILY PRN
Qty: 30 TABLET | Refills: 3 | Status: SHIPPED | OUTPATIENT
Start: 2022-07-06

## 2022-07-06 RX ORDER — ERGOCALCIFEROL 1.25 MG/1
50000 CAPSULE ORAL WEEKLY
Qty: 4 CAPSULE | Refills: 3 | Status: CANCELLED | OUTPATIENT
Start: 2022-07-06

## 2022-07-26 DIAGNOSIS — I48.91 ATRIAL FIBRILLATION, UNSPECIFIED TYPE (HCC): ICD-10-CM

## 2022-07-26 DIAGNOSIS — R00.1 BRADYCARDIA: ICD-10-CM

## 2022-08-16 RX ORDER — RIVAROXABAN 20 MG/1
TABLET, FILM COATED ORAL
Qty: 90 TABLET | Refills: 1 | Status: SHIPPED | OUTPATIENT
Start: 2022-08-16

## 2022-08-18 ENCOUNTER — TELEPHONE (OUTPATIENT)
Dept: INTERNAL MEDICINE CLINIC | Facility: CLINIC | Age: 67
End: 2022-08-18

## 2022-08-18 DIAGNOSIS — I10 ESSENTIAL HYPERTENSION: ICD-10-CM

## 2022-08-18 RX ORDER — LOSARTAN POTASSIUM 25 MG/1
25 TABLET ORAL DAILY
Qty: 180 TABLET | Refills: 3 | Status: SHIPPED | OUTPATIENT
Start: 2022-08-18

## 2022-08-18 NOTE — TELEPHONE ENCOUNTER
Patient is calling to inform PCP she has been taking the following medication in error. She has been taking it 2 times a day instead of 1 time a day. Patient notes she will run out and the pharmacy will need to know she will need th prescription sooner than expected as she just realized the error. Patient is requesting a call back to clarify.     LOSARTAN

## 2022-08-18 NOTE — TELEPHONE ENCOUNTER
Updated chart to state that she is taking twice a day, monitor blood pressure at home and email/MyChart back the readings  Refill the losartan is 25 mg p.o. twice daily

## 2022-08-18 NOTE — TELEPHONE ENCOUNTER
Spoke to patient's daughter Lilia Dwyer, ok per SAMANTHA (name and  of patient verified). Dr. Wale Horne message reviewed. Daughter verbalizes understanding; denies further questions and agrees with plan of care and states she will inform her mother of the message.

## 2022-08-18 NOTE — TELEPHONE ENCOUNTER
Dr. Molly Mehta - pt accidentally was taking Losartan 25mg BID. Okay to send a new Rx or do you have any other recommendations? Needs a new losartan Rx sent (ran out). Denies new symptoms of low blood pressure. RN called patient. Patient's date of birth and full name both confirmed. Pt reports after amlodipine was discontinued and changes to losartan, she started to take losartan twice a day, and she just realized it was supposed to be once a day. She wants to double check. RN reviewed chart and advised Losartan is once a day, 25mg. She denies any symptoms of new dizziness, lightheadedness, vision changes, headache, chest pressure/pain, shortness of breath. [employer] utilized.   Ann-Marie Jones,  ID # [id number]

## 2022-08-23 DIAGNOSIS — I10 ESSENTIAL HYPERTENSION: ICD-10-CM

## 2022-08-23 RX ORDER — AMLODIPINE BESYLATE 5 MG/1
TABLET ORAL
Qty: 180 TABLET | Refills: 3 | OUTPATIENT
Start: 2022-08-23

## 2022-08-23 NOTE — TELEPHONE ENCOUNTER
This is the previous prescription, rx changed per Dr. Natalia Gautam note 9/21/21: Decrease amlodipine to 5 mg once a day hysteroscopy, diagnostic laparoscopy

## 2022-08-31 ENCOUNTER — PATIENT MESSAGE (OUTPATIENT)
Dept: INTERNAL MEDICINE CLINIC | Facility: CLINIC | Age: 67
End: 2022-08-31

## 2022-08-31 DIAGNOSIS — I10 ESSENTIAL HYPERTENSION: ICD-10-CM

## 2022-09-01 RX ORDER — LOSARTAN POTASSIUM 25 MG/1
25 TABLET ORAL 2 TIMES DAILY
Qty: 180 TABLET | Refills: 3 | Status: SHIPPED | OUTPATIENT
Start: 2022-09-01

## 2022-09-01 NOTE — TELEPHONE ENCOUNTER
Please review my chart with media and advise on Losartan dose. This is what was sent in last time:      losartan 25 MG Oral Tab 180 tablet 3 8/18/2022     Sig - Route:  Take 1 tablet (25 mg total) by mouth daily. - Oral

## 2022-09-30 DIAGNOSIS — M18.0 OSTEOARTHRITIS OF CARPOMETACARPAL (CMC) JOINTS OF BOTH THUMBS, UNSPECIFIED OSTEOARTHRITIS TYPE: ICD-10-CM

## 2022-09-30 RX ORDER — CELECOXIB 200 MG/1
200 CAPSULE ORAL DAILY PRN
Qty: 10 CAPSULE | Refills: 3 | Status: SHIPPED | OUTPATIENT
Start: 2022-09-30

## 2022-09-30 NOTE — TELEPHONE ENCOUNTER
Refill passed per [place] [employer], [facility] protocol.     Requested Prescriptions   Pending Prescriptions Disp Refills    CELECOXIB 200 MG Oral Cap [Pharmacy Med Name: CELECOXIB 200MG CAPSULES] 10 capsule 3     Sig: TAKE 1 CAPSULE(200 MG) BY MOUTH DAILY AS NEEDED FOR PAIN        Non-Narcotic Pain Medication Protocol Passed - 9/30/2022 12:44 PM        Passed - In person appointment or virtual visit in the past 6 mos or appointment in next 3 mos       Recent Outpatient Visits              2 months ago Essential hypertension    Bill Headley MD    Office Visit    1 year ago Paroxysmal atrial fibrillation [facility])    [facility] Maximus Steiner MD    Office Visit    1 year ago Encounter for annual health examination    Bill Headley MD    Office Visit    1 year ago Paroxysmal atrial fibrillation [facility])    [facility] Maximus Steiner MD    Office Visit    1 year ago Atrial fibrillation and flutter [facility])    [facility] MIHAI Denny    Office Visit                       Recent Outpatient Visits              2 months ago Essential hypertension    Bill Headley MD    Office Visit    1 year ago Paroxysmal atrial fibrillation [facility])    [facility] Maximus Steiner MD    Office Visit    1 year ago Encounter for annual health examination    Eunice Jay MD    Office Visit    1 year ago Paroxysmal atrial fibrillation [facility])    [facility] Maximus Steiner MD    Office Visit    1 year ago Atrial fibrillation and flutter [facility])    [facility] MIHAI Denny    Office Visit

## 2022-10-07 ENCOUNTER — HOSPITAL ENCOUNTER (OUTPATIENT)
Age: 67
Discharge: HOME | End: 2022-10-07
Payer: MEDICARE

## 2022-10-07 VITALS
BODY MASS INDEX: 27.28 KG/M2 | OXYGEN SATURATION: 99 % | TEMPERATURE: 97 F | RESPIRATION RATE: 18 BRPM | HEIGHT: 68 IN | DIASTOLIC BLOOD PRESSURE: 77 MMHG | HEART RATE: 56 BPM | SYSTOLIC BLOOD PRESSURE: 177 MMHG | WEIGHT: 180 LBS

## 2022-10-07 DIAGNOSIS — H92.03 EARACHE SYMPTOMS IN BOTH EARS: Primary | ICD-10-CM

## 2022-10-26 ENCOUNTER — APPOINTMENT (OUTPATIENT)
Dept: CT IMAGING | Facility: HOSPITAL | Age: 67
End: 2022-10-26
Attending: EMERGENCY MEDICINE
Payer: MEDICARE

## 2022-10-26 ENCOUNTER — HOSPITAL ENCOUNTER (EMERGENCY)
Facility: HOSPITAL | Age: 67
Discharge: HOME | End: 2022-10-26
Attending: EMERGENCY MEDICINE
Payer: MEDICARE

## 2022-10-26 VITALS
SYSTOLIC BLOOD PRESSURE: 141 MMHG | HEIGHT: 68 IN | DIASTOLIC BLOOD PRESSURE: 61 MMHG | OXYGEN SATURATION: 97 % | TEMPERATURE: 97 F | BODY MASS INDEX: 27.28 KG/M2 | WEIGHT: 180 LBS | RESPIRATION RATE: 18 BRPM | HEART RATE: 49 BPM

## 2022-10-26 DIAGNOSIS — R03.0 ELEVATED BLOOD PRESSURE READING: Primary | ICD-10-CM

## 2022-10-26 LAB
ANION GAP SERPL CALC-SCNC: 3 MMOL/L (ref 0–18)
BASOPHILS # BLD AUTO: 0.02 X10(3) UL (ref 0–0.2)
BASOPHILS NFR BLD AUTO: 0.3 %
BILIRUB UR QL: NEGATIVE
BUN BLD-MCNC: 15 MG/DL (ref 7–18)
BUN/CREAT SERPL: 14.4 (ref 10–20)
CALCIUM BLD-MCNC: 9.6 MG/DL (ref 8.5–10.1)
CHLORIDE SERPL-SCNC: 109 MMOL/L (ref 98–112)
CLARITY UR: CLEAR
CO2 SERPL-SCNC: 28 MMOL/L (ref 21–32)
COLOR UR: YELLOW
CREAT BLD-MCNC: 1.04 MG/DL
DEPRECATED RDW RBC AUTO: 45.6 FL (ref 35.1–46.3)
EOSINOPHIL # BLD AUTO: 0.26 X10(3) UL (ref 0–0.7)
EOSINOPHIL NFR BLD AUTO: 4.4 %
ERYTHROCYTE [DISTWIDTH] IN BLOOD BY AUTOMATED COUNT: 13.2 % (ref 11–15)
GFR SERPLBLD BASED ON 1.73 SQ M-ARVRAT: 59 ML/MIN/1.73M2 (ref 60–?)
GLUCOSE BLD-MCNC: 90 MG/DL (ref 70–99)
GLUCOSE UR-MCNC: NEGATIVE MG/DL
HCT VFR BLD AUTO: 40.7 %
HGB BLD-MCNC: 13.1 G/DL
HGB UR QL STRIP.AUTO: NEGATIVE
IMM GRANULOCYTES # BLD AUTO: 0.02 X10(3) UL (ref 0–1)
IMM GRANULOCYTES NFR BLD: 0.3 %
KETONES UR-MCNC: NEGATIVE MG/DL
LEUKOCYTE ESTERASE UR QL STRIP.AUTO: NEGATIVE
LYMPHOCYTES # BLD AUTO: 1.04 X10(3) UL (ref 1–4)
LYMPHOCYTES NFR BLD AUTO: 17.4 %
MCH RBC QN AUTO: 30.3 PG (ref 26–34)
MCHC RBC AUTO-ENTMCNC: 32.2 G/DL (ref 31–37)
MCV RBC AUTO: 94 FL
MONOCYTES # BLD AUTO: 0.55 X10(3) UL (ref 0.1–1)
MONOCYTES NFR BLD AUTO: 9.2 %
NEUTROPHILS # BLD AUTO: 4.07 X10 (3) UL (ref 1.5–7.7)
NEUTROPHILS # BLD AUTO: 4.07 X10(3) UL (ref 1.5–7.7)
NEUTROPHILS NFR BLD AUTO: 68.4 %
NITRITE UR QL STRIP.AUTO: NEGATIVE
OSMOLALITY SERPL CALC.SUM OF ELEC: 290 MOSM/KG (ref 275–295)
PH UR: 6 [PH] (ref 5–8)
PLATELET # BLD AUTO: 185 10(3)UL (ref 150–450)
POTASSIUM SERPL-SCNC: 4.6 MMOL/L (ref 3.5–5.1)
PROT UR-MCNC: NEGATIVE MG/DL
RBC # BLD AUTO: 4.33 X10(6)UL
SODIUM SERPL-SCNC: 140 MMOL/L (ref 136–145)
SP GR UR STRIP: 1.01 (ref 1–1.03)
UROBILINOGEN UR STRIP-ACNC: <2
VIT C UR-MCNC: NEGATIVE MG/DL
WBC # BLD AUTO: 6 X10(3) UL (ref 4–11)

## 2022-10-26 PROCEDURE — 70450 CT HEAD/BRAIN W/O DYE: CPT | Performed by: EMERGENCY MEDICINE

## 2022-10-26 PROCEDURE — 80048 BASIC METABOLIC PNL TOTAL CA: CPT

## 2022-10-26 PROCEDURE — 99285 EMERGENCY DEPT VISIT HI MDM: CPT

## 2022-10-26 PROCEDURE — 36415 COLL VENOUS BLD VENIPUNCTURE: CPT

## 2022-10-26 PROCEDURE — 85025 COMPLETE CBC W/AUTO DIFF WBC: CPT | Performed by: EMERGENCY MEDICINE

## 2022-10-26 PROCEDURE — 85025 COMPLETE CBC W/AUTO DIFF WBC: CPT

## 2022-10-26 PROCEDURE — 99284 EMERGENCY DEPT VISIT MOD MDM: CPT

## 2022-10-26 PROCEDURE — 81003 URINALYSIS AUTO W/O SCOPE: CPT

## 2022-10-26 PROCEDURE — 93005 ELECTROCARDIOGRAM TRACING: CPT

## 2022-10-26 PROCEDURE — 93010 ELECTROCARDIOGRAM REPORT: CPT | Performed by: INTERNAL MEDICINE

## 2022-10-26 PROCEDURE — 80048 BASIC METABOLIC PNL TOTAL CA: CPT | Performed by: EMERGENCY MEDICINE

## 2022-10-26 PROCEDURE — 81003 URINALYSIS AUTO W/O SCOPE: CPT | Performed by: EMERGENCY MEDICINE

## 2022-10-26 RX ORDER — ACETAMINOPHEN 500 MG
1000 TABLET ORAL ONCE
Status: COMPLETED | OUTPATIENT
Start: 2022-10-26 | End: 2022-10-26

## 2022-10-26 NOTE — ED INITIAL ASSESSMENT (HPI)
Patient to ED for continued HTN this past week, takes losartan and metoprolol and reports she has been taking extra the last two days in order to bring it down, blood pressure log shows 118K-517O systolically this week. Today she reports headache and dizziness.

## 2022-10-27 NOTE — DISCHARGE INSTRUCTIONS
Please talk to your doctor or cardiologist about increasing her losartan from 25 mg twice a day to 50 mg in the morning to start and then 50 mg again at nighttime.

## 2022-11-08 ENCOUNTER — TELEPHONE (OUTPATIENT)
Dept: INTERNAL MEDICINE CLINIC | Facility: CLINIC | Age: 67
End: 2022-11-08

## 2022-11-08 NOTE — TELEPHONE ENCOUNTER
Patient is requesting the following medication refill and mentions the ER doctor on 10/26/22 prescribed and mentions she is running low on medication.     Patient states the dosage as follows; please advise    2 pills twice a day     Losartan 25 mg

## 2022-12-20 ENCOUNTER — TELEPHONE (OUTPATIENT)
Dept: INTERNAL MEDICINE CLINIC | Facility: CLINIC | Age: 67
End: 2022-12-20

## 2022-12-20 RX ORDER — LOSARTAN POTASSIUM 50 MG/1
50 TABLET ORAL 2 TIMES DAILY
Qty: 180 TABLET | Refills: 1 | Status: SHIPPED | OUTPATIENT
Start: 2022-12-20

## 2022-12-20 NOTE — TELEPHONE ENCOUNTER
[employer]: Michael Mills ID # [id number] (verified Name and ). Patient is requesting for prescription of losartan to be increased to 50 mg twice a day (originally losartan 25 mg BID). She was advised to increase her losartan twice a day as needed when she was seen in [address] [place] [address] ED on 10/26 for high blood pressure. She has been taking two 25-mg dose of losartan twice a day since and she is afraid her supply is not going to last.    Dr. Cristi Mercado please see pended Rx and advise if appropriate.

## 2022-12-20 NOTE — TELEPHONE ENCOUNTER
Patient calling to state needs updated dosage sent to pharmacy, as doctor now has her taking 2 pills of losartan in morning and 2 at night. Pharmacy only has directions stating 1 in morning and 1 at night.

## 2023-01-04 DIAGNOSIS — F41.9 ANXIETY: ICD-10-CM

## 2023-01-05 RX ORDER — ALPRAZOLAM 0.5 MG/1
TABLET ORAL
Qty: 30 TABLET | Refills: 0 | Status: SHIPPED | OUTPATIENT
Start: 2023-01-05

## 2023-02-17 NOTE — TELEPHONE ENCOUNTER
No protocol for requested medication, please advise.     Requested Prescriptions     Pending Prescriptions Disp Refills    XARELTO 20 MG Oral Tab [Pharmacy Med Name: Milas Jeffry 20MG TABLETS] 90 tablet 1     Sig: TAKE 1 TABLET(20 MG) BY MOUTH DAILY WITH FOOD      Recent Visits  Date Type Provider Dept   07/06/22 Office Visit Abhijeet Knox MD [facility]   Showing recent visits within past 540 days with a meds authorizing provider and meeting all other requirements  Future Appointments  Date Type Provider Dept   02/18/23 Appointment Radha William PA-C [facility]   Showing future appointments within next 150 days with a meds authorizing provider and meeting all other requirements     Requested Prescriptions   Pending Prescriptions Disp Refills    XARELTO 20 MG Oral Tab [Pharmacy Med Name: Milas Jeffry 20MG TABLETS] 90 tablet 1     Sig: TAKE 1 TABLET(20 MG) BY MOUTH DAILY WITH FOOD       There is no refill protocol information for this order         Future Appointments         Provider Department Appt Notes    Tomorrow Radha William PA-C [address] [facility]  [facility] *BA           Recent Outpatient Visits              7 months ago [facility]    [facility]    1 year ago [facility]oxysmal atrial fibrillation [facility])    [facility]    Office Visit    1 year ago Encounter for annual health examination    [facility]    Office Visit    1 year ago Paroxysmal atrial fibrillation [facility])    [facility]    Office Visit    1 year ago Atrial fibrillation and flutter [facility])    [facility]    [facility] Visit

## 2023-02-18 ENCOUNTER — OFFICE VISIT (OUTPATIENT)
Dept: INTERNAL MEDICINE CLINIC | Facility: CLINIC | Age: 68
End: 2023-02-18

## 2023-02-18 VITALS
BODY MASS INDEX: 26.37 KG/M2 | RESPIRATION RATE: 16 BRPM | WEIGHT: 174 LBS | SYSTOLIC BLOOD PRESSURE: 134 MMHG | HEART RATE: 72 BPM | DIASTOLIC BLOOD PRESSURE: 80 MMHG | HEIGHT: 68 IN

## 2023-02-18 DIAGNOSIS — R35.0 FREQUENCY OF URINATION: Primary | ICD-10-CM

## 2023-02-18 DIAGNOSIS — R10.2 SUPRAPUBIC ABDOMINAL PAIN: ICD-10-CM

## 2023-02-18 LAB
BILIRUBIN: NEGATIVE
GLUCOSE (URINE DIPSTICK): NEGATIVE MG/DL
KETONES (URINE DIPSTICK): NEGATIVE MG/DL
LEUKOCYTES: NEGATIVE
MULTISTIX LOT#: NORMAL NUMERIC
NITRITE, URINE: NEGATIVE
OCCULT BLOOD: NEGATIVE
PH, URINE: 6 (ref 4.5–8)
SPECIFIC GRAVITY: 1.01 (ref 1–1.03)
URINE-COLOR: YELLOW
UROBILINOGEN,SEMI-QN: 0.2 MG/DL (ref 0–1.9)

## 2023-02-18 PROCEDURE — 99214 OFFICE O/P EST MOD 30 MIN: CPT | Performed by: PHYSICIAN ASSISTANT

## 2023-02-18 PROCEDURE — 81002 URINALYSIS NONAUTO W/O SCOPE: CPT | Performed by: PHYSICIAN ASSISTANT

## 2023-02-18 RX ORDER — SULFAMETHOXAZOLE AND TRIMETHOPRIM 800; 160 MG/1; MG/1
1 TABLET ORAL 2 TIMES DAILY
Qty: 6 TABLET | Refills: 0 | Status: SHIPPED | OUTPATIENT
Start: 2023-02-18 | End: 2023-02-21

## 2023-02-24 DIAGNOSIS — F41.9 ANXIETY: ICD-10-CM

## 2023-02-25 RX ORDER — ALPRAZOLAM 0.5 MG/1
TABLET ORAL
Qty: 30 TABLET | Refills: 0 | Status: SHIPPED | OUTPATIENT
Start: 2023-02-25

## 2023-02-25 NOTE — TELEPHONE ENCOUNTER
No protocol for requested medication. Please advise on refill request.      Requested Prescriptions     Pending Prescriptions Disp Refills    ALPRAZOLAM 0.5 MG Oral Tab [Pharmacy Med Name: ALPRAZOLAM 0.5MG TABLETS] 30 tablet 0     Sig: TAKE 1 TABLET(0.5 MG) BY MOUTH DAILY AS NEEDED      Recent Visits  Date Type Provider Dept   02/18/23 Office Visit Ele Mahmood PA-C [facility]   07/06/22 Office Visit Robyn Erwin MD [facility]   Showing recent visits within past 540 days with a meds authorizing provider and meeting all other requirements  Future Appointments  No visits were found meeting these conditions.   Showing future appointments within next 150 days with a meds authorizing provider and meeting all other requirements     Requested Prescriptions   Pending Prescriptions Disp Refills    ALPRAZOLAM 0.5 MG Oral Tab [Pharmacy Med Name: ALPRAZOLAM 0.5MG TABLETS] 30 tablet 0     Sig: TAKE 1 TABLET(0.5 MG) BY MOUTH DAILY AS NEEDED       There is no refill protocol information for this order            Recent Outpatient Visits              1 week ago Frequency of urination    [facility], [address] [place] [address] Ele Mahmood PA-C    Office Visit    7 months ago Essential hypertension    Cyrus Konig, Paige Lombard, MD    Office Visit    1 year ago Paroxysmal atrial fibrillation [facility])    Anatoliy Avelar MD    Office Visit    1 year ago Encounter for annual health examination    Cyrus Konig, Paige Lombard, MD    Office Visit    1 year ago Paroxysmal atrial fibrillation [facility])    Anatoliy Avelar MD    Office Visit

## 2023-03-25 ENCOUNTER — HOSPITAL ENCOUNTER (OUTPATIENT)
Dept: ULTRASOUND IMAGING | Age: 68
Discharge: HOME | End: 2023-03-25
Attending: PHYSICIAN ASSISTANT
Payer: MEDICARE

## 2023-03-25 DIAGNOSIS — R10.2 SUPRAPUBIC ABDOMINAL PAIN: ICD-10-CM

## 2023-03-25 PROCEDURE — 76770 US EXAM ABDO BACK WALL COMP: CPT | Performed by: PHYSICIAN ASSISTANT

## 2023-04-24 DIAGNOSIS — I48.91 ATRIAL FIBRILLATION, UNSPECIFIED TYPE (HCC): ICD-10-CM

## 2023-04-24 RX ORDER — ATORVASTATIN CALCIUM 10 MG/1
10 TABLET, FILM COATED ORAL NIGHTLY
Qty: 90 TABLET | Refills: 3 | Status: SHIPPED | OUTPATIENT
Start: 2023-04-24

## 2023-04-24 NOTE — TELEPHONE ENCOUNTER
Refill passed per [place] [employer] protocol.    Requested Prescriptions   Pending Prescriptions Disp Refills    ATORVASTATIN 10 MG Oral Tab [Pharmacy Med Name: ATORVASTATIN 10MG TABLETS] 90 tablet 3     Sig: TAKE 1 TABLET(10 MG) BY MOUTH EVERY NIGHT       Cholesterol Medication Protocol Passed - 4/24/2023  1:08 PM        Passed - ALT in past 12 months        Passed - LDL in past 12 months        Passed - Last ALT < 80     Lab Results   Component Value Date    ALT 20 04/30/2022             Passed - Last LDL < 130     Lab Results   Component Value Date    LDL 84 07/02/2022               Passed - In person appointment or virtual visit in the past 12 mos or appointment in next 3 mos     Recent Outpatient Visits              2 months ago Frequency of urination    [facility], [address] [facility]    Office Visit    9 months ago Essential hypertension    Kamlesh Matute MD    Office Visit    1 year ago Paroxysmal atrial fibrillation [facility])    Desi Matute MD    Office Visit    1 year ago Encounter for annual health examination    Kamlesh Matute MD    Office Visit    1 year ago Paroxysmal atrial fibrillation [facility])    Desi Matute MD    Office Visit                            Recent Outpatient Visits              2 months ago Frequency of urination    [facility], [address] [facility]    Office Visit    9 months ago Essential hypertension    Mamta Giraldo MD    Office Visit    1 year ago Paroxysmal atrial fibrillation [facility])    Desi Matute MD    Office Visit    1 year ago Encounter for annual health examination    Casey Fallon MD    Office Visit    1 year ago Paroxysmal atrial fibrillation [facility])    Abril November, Matti Root MD    Office Visit

## 2023-05-17 ENCOUNTER — NURSE TRIAGE (OUTPATIENT)
Dept: INTERNAL MEDICINE CLINIC | Facility: CLINIC | Age: 68
End: 2023-05-17

## 2023-05-23 ENCOUNTER — OFFICE VISIT (OUTPATIENT)
Dept: INTERNAL MEDICINE CLINIC | Facility: CLINIC | Age: 68
End: 2023-05-23

## 2023-05-23 ENCOUNTER — LAB ENCOUNTER (OUTPATIENT)
Dept: LAB | Age: 68
End: 2023-05-23
Attending: INTERNAL MEDICINE
Payer: MEDICARE

## 2023-05-23 VITALS
OXYGEN SATURATION: 97 % | BODY MASS INDEX: 26.98 KG/M2 | RESPIRATION RATE: 18 BRPM | DIASTOLIC BLOOD PRESSURE: 76 MMHG | HEART RATE: 65 BPM | SYSTOLIC BLOOD PRESSURE: 112 MMHG | HEIGHT: 68 IN | WEIGHT: 178 LBS

## 2023-05-23 DIAGNOSIS — I82.452 ACUTE DEEP VEIN THROMBOSIS (DVT) OF LEFT PERONEAL VEIN (HCC): ICD-10-CM

## 2023-05-23 DIAGNOSIS — Z23 NEED FOR VACCINATION: ICD-10-CM

## 2023-05-23 DIAGNOSIS — M25.50 MULTIPLE JOINT PAIN: Primary | ICD-10-CM

## 2023-05-23 DIAGNOSIS — Z78.0 ASYMPTOMATIC MENOPAUSAL STATE: ICD-10-CM

## 2023-05-23 DIAGNOSIS — M25.50 PAIN IN JOINT, MULTIPLE SITES: Primary | ICD-10-CM

## 2023-05-23 DIAGNOSIS — E03.9 HYPOTHYROIDISM, UNSPECIFIED TYPE: ICD-10-CM

## 2023-05-23 DIAGNOSIS — Z01.419 ENCOUNTER FOR ROUTINE GYNECOLOGICAL EXAMINATION WITH PAPANICOLAOU SMEAR OF CERVIX: ICD-10-CM

## 2023-05-23 DIAGNOSIS — M25.50 MULTIPLE JOINT PAIN: ICD-10-CM

## 2023-05-23 DIAGNOSIS — F41.9 ANXIETY: ICD-10-CM

## 2023-05-23 DIAGNOSIS — Z91.89 AT RISK FOR DECREASED BONE DENSITY: ICD-10-CM

## 2023-05-23 DIAGNOSIS — Z12.31 SCREENING MAMMOGRAM, ENCOUNTER FOR: ICD-10-CM

## 2023-05-23 DIAGNOSIS — F13.20 SEDATIVE, HYPNOTIC OR ANXIOLYTIC DEPENDENCE, UNCOMPLICATED (HCC): ICD-10-CM

## 2023-05-23 LAB
ERYTHROCYTE [SEDIMENTATION RATE] IN BLOOD: 16 MM/HR
RHEUMATOID FACT SERPL-ACNC: <10 IU/ML (ref ?–15)
TSI SER-ACNC: 1.27 MIU/ML (ref 0.36–3.74)

## 2023-05-23 PROCEDURE — 86200 CCP ANTIBODY: CPT

## 2023-05-23 PROCEDURE — 1125F AMNT PAIN NOTED PAIN PRSNT: CPT | Performed by: INTERNAL MEDICINE

## 2023-05-23 PROCEDURE — 90677 PCV20 VACCINE IM: CPT | Performed by: INTERNAL MEDICINE

## 2023-05-23 PROCEDURE — 86431 RHEUMATOID FACTOR QUANT: CPT

## 2023-05-23 PROCEDURE — 84443 ASSAY THYROID STIM HORMONE: CPT

## 2023-05-23 PROCEDURE — 86038 ANTINUCLEAR ANTIBODIES: CPT

## 2023-05-23 PROCEDURE — G0009 ADMIN PNEUMOCOCCAL VACCINE: HCPCS | Performed by: INTERNAL MEDICINE

## 2023-05-23 PROCEDURE — 86225 DNA ANTIBODY NATIVE: CPT

## 2023-05-23 PROCEDURE — 85652 RBC SED RATE AUTOMATED: CPT

## 2023-05-23 PROCEDURE — 99215 OFFICE O/P EST HI 40 MIN: CPT | Performed by: INTERNAL MEDICINE

## 2023-05-23 PROCEDURE — 36415 COLL VENOUS BLD VENIPUNCTURE: CPT

## 2023-05-23 RX ORDER — BUSPIRONE HYDROCHLORIDE 10 MG/1
10 TABLET ORAL 2 TIMES DAILY
Qty: 180 TABLET | Refills: 3 | Status: SHIPPED | OUTPATIENT
Start: 2023-05-23 | End: 2024-05-17

## 2023-05-23 RX ORDER — ALPRAZOLAM 0.5 MG/1
0.5 TABLET ORAL
Qty: 30 TABLET | Refills: 0 | Status: SHIPPED | OUTPATIENT
Start: 2023-05-23

## 2023-05-23 RX ORDER — HYDROCODONE BITARTRATE AND ACETAMINOPHEN 5; 325 MG/1; MG/1
1 TABLET ORAL 2 TIMES DAILY PRN
Qty: 10 TABLET | Refills: 0 | Status: SHIPPED | OUTPATIENT
Start: 2023-05-23

## 2023-05-25 LAB
CCP IGG SERPL-ACNC: 0.5 U/ML (ref 0–6.9)
DSDNA IGG SERPL IA-ACNC: 1.7 IU/ML
ENA AB SER QL IA: 0.1 UG/L
ENA AB SER QL IA: NEGATIVE

## 2023-05-26 RX ORDER — LOSARTAN POTASSIUM 50 MG/1
50 TABLET ORAL 2 TIMES DAILY
Qty: 180 TABLET | Refills: 3 | Status: SHIPPED | OUTPATIENT
Start: 2023-05-26

## 2023-05-26 RX ORDER — LOSARTAN POTASSIUM 50 MG/1
50 TABLET ORAL 2 TIMES DAILY
Qty: 180 TABLET | Refills: 1 | OUTPATIENT
Start: 2023-05-26

## 2023-05-26 NOTE — TELEPHONE ENCOUNTER
Please review. Protocol failed or has no protocol. Requested Prescriptions   Pending Prescriptions Disp Refills    LOSARTAN 50 MG Oral Tab [Pharmacy Med Name: LOSARTAN 50MG TABLETS] 180 tablet 1     Sig: TAKE 1 TABLET(50 MG) BY MOUTH TWICE DAILY       Hypertensive Medications Protocol Failed - 5/26/2023  7:36 AM        Failed - CMP or BMP in past 6 months     No results found for this or any previous visit (from the past 4392 hour(s)).               Passed - In person appointment in the past 12 or next 3 months     Recent Outpatient Visits              3 days ago Multiple joint pain    Priyanka Carcamo MD    Office Visit    3 months ago Frequency of urination    [facility], [address] [place], [place] [address]Roxane abdi PA-C    Office Visit    10 months ago Essential hypertension    Priyanka Carcamo MD    Office Visit    1 year ago Paroxysmal atrial fibrillation [facility])    Nellie Carcamo MD    Office Visit    1 year ago Encounter for annual health examination    Priyanka Carcamo MD    Office Visit                      Passed - Last BP reading less than 140/90     BP Readings from Last 1 Encounters:  05/23/23 : 112/76                Passed - In person appointment or virtual visit in the past 6 months     Recent Outpatient Visits              3 days ago Multiple joint pain    Priyanka Carcamo MD    Office Visit    3 months ago Frequency of urination    [facility], [address] [place], [place] [address]Roxane abdi PA-C    Office Visit    10 months ago Essential hypertension    Priyanka Carcamo MD    Office Visit    1 year ago [facility] Tanvir Hoang MD    Office Visit    1 year ago Encounter for annual health examination    Holland Chow MD    Office Visit                      Passed - EGFRCR or GFRNAA > 50     GFR Evaluation  EGFRCR: 59 , resulted on 10/26/2022                 Recent Outpatient Visits              3 days ago Multiple joint pain    Holland Chow MD    Office Visit    3 months ago Frequency of urination    [facility], [address] [place] [address] Orlando Caceres PA-C    Office Visit    10 months ago Essential hypertension    Holland Chow MD    Office Visit    1 year ago Paroxysmal atrial fibrillation [facility])    Malathi Chow MD    Office Visit    1 year ago Encounter for annual health examination    Holland Chow MD    Office Visit

## 2023-07-09 ENCOUNTER — HOSPITAL ENCOUNTER (OUTPATIENT)
Facility: HOSPITAL | Age: 68
Setting detail: OBSERVATION
Discharge: HOME | End: 2023-07-10
Attending: INTERNAL MEDICINE | Admitting: INTERNAL MEDICINE
Payer: MEDICARE

## 2023-07-09 ENCOUNTER — APPOINTMENT (OUTPATIENT)
Dept: GENERAL RADIOLOGY | Facility: HOSPITAL | Age: 68
End: 2023-07-09
Payer: MEDICARE

## 2023-07-09 DIAGNOSIS — R07.9 CHEST PAIN OF UNCERTAIN ETIOLOGY: Primary | ICD-10-CM

## 2023-07-09 DIAGNOSIS — I48.91 ATRIAL FIBRILLATION WITH RAPID VENTRICULAR RESPONSE (HCC): ICD-10-CM

## 2023-07-09 LAB
ALBUMIN SERPL-MCNC: 3.1 G/DL (ref 3.4–5)
ALBUMIN/GLOB SERPL: 0.9 {RATIO} (ref 1–2)
ALP LIVER SERPL-CCNC: 63 U/L
ALT SERPL-CCNC: 17 U/L
ANION GAP SERPL CALC-SCNC: 0 MMOL/L (ref 0–18)
ANION GAP SERPL CALC-SCNC: 7 MMOL/L (ref 0–18)
AST SERPL-CCNC: 14 U/L (ref 15–37)
BASOPHILS # BLD AUTO: 0.01 X10(3) UL (ref 0–0.2)
BASOPHILS # BLD AUTO: 0.02 X10(3) UL (ref 0–0.2)
BASOPHILS NFR BLD AUTO: 0.2 %
BASOPHILS NFR BLD AUTO: 0.3 %
BILIRUB SERPL-MCNC: 0.3 MG/DL (ref 0.1–2)
BUN BLD-MCNC: 15 MG/DL (ref 7–18)
BUN BLD-MCNC: 16 MG/DL (ref 7–18)
BUN/CREAT SERPL: 14 (ref 10–20)
BUN/CREAT SERPL: 15.5 (ref 10–20)
CALCIUM BLD-MCNC: 9.1 MG/DL (ref 8.5–10.1)
CALCIUM BLD-MCNC: 9.2 MG/DL (ref 8.5–10.1)
CHLORIDE SERPL-SCNC: 107 MMOL/L (ref 98–112)
CHLORIDE SERPL-SCNC: 110 MMOL/L (ref 98–112)
CO2 SERPL-SCNC: 26 MMOL/L (ref 21–32)
CO2 SERPL-SCNC: 30 MMOL/L (ref 21–32)
CREAT BLD-MCNC: 1.03 MG/DL
CREAT BLD-MCNC: 1.07 MG/DL
DEPRECATED RDW RBC AUTO: 42 FL (ref 35.1–46.3)
DEPRECATED RDW RBC AUTO: 42.8 FL (ref 35.1–46.3)
EOSINOPHIL # BLD AUTO: 0.19 X10(3) UL (ref 0–0.7)
EOSINOPHIL # BLD AUTO: 0.22 X10(3) UL (ref 0–0.7)
EOSINOPHIL NFR BLD AUTO: 3.2 %
EOSINOPHIL NFR BLD AUTO: 3.8 %
ERYTHROCYTE [DISTWIDTH] IN BLOOD BY AUTOMATED COUNT: 12.8 % (ref 11–15)
ERYTHROCYTE [DISTWIDTH] IN BLOOD BY AUTOMATED COUNT: 12.8 % (ref 11–15)
GFR SERPLBLD BASED ON 1.73 SQ M-ARVRAT: 56 ML/MIN/1.73M2 (ref 60–?)
GFR SERPLBLD BASED ON 1.73 SQ M-ARVRAT: 59 ML/MIN/1.73M2 (ref 60–?)
GLOBULIN PLAS-MCNC: 3.3 G/DL (ref 2.8–4.4)
GLUCOSE BLD-MCNC: 106 MG/DL (ref 70–99)
GLUCOSE BLD-MCNC: 107 MG/DL (ref 70–99)
HCT VFR BLD AUTO: 35.8 %
HCT VFR BLD AUTO: 36.1 %
HGB BLD-MCNC: 11.9 G/DL
HGB BLD-MCNC: 11.9 G/DL
IMM GRANULOCYTES # BLD AUTO: 0.02 X10(3) UL (ref 0–1)
IMM GRANULOCYTES # BLD AUTO: 0.02 X10(3) UL (ref 0–1)
IMM GRANULOCYTES NFR BLD: 0.3 %
IMM GRANULOCYTES NFR BLD: 0.3 %
LYMPHOCYTES # BLD AUTO: 1.24 X10(3) UL (ref 1–4)
LYMPHOCYTES # BLD AUTO: 1.48 X10(3) UL (ref 1–4)
LYMPHOCYTES NFR BLD AUTO: 21.6 %
LYMPHOCYTES NFR BLD AUTO: 24.9 %
MAGNESIUM SERPL-MCNC: 2.3 MG/DL (ref 1.6–2.6)
MCH RBC QN AUTO: 30.4 PG (ref 26–34)
MCH RBC QN AUTO: 30.4 PG (ref 26–34)
MCHC RBC AUTO-ENTMCNC: 33 G/DL (ref 31–37)
MCHC RBC AUTO-ENTMCNC: 33.2 G/DL (ref 31–37)
MCV RBC AUTO: 91.6 FL
MCV RBC AUTO: 92.1 FL
MONOCYTES # BLD AUTO: 0.56 X10(3) UL (ref 0.1–1)
MONOCYTES # BLD AUTO: 0.62 X10(3) UL (ref 0.1–1)
MONOCYTES NFR BLD AUTO: 10.8 %
MONOCYTES NFR BLD AUTO: 9.4 %
NEUTROPHILS # BLD AUTO: 3.64 X10 (3) UL (ref 1.5–7.7)
NEUTROPHILS # BLD AUTO: 3.64 X10(3) UL (ref 1.5–7.7)
NEUTROPHILS # BLD AUTO: 3.67 X10 (3) UL (ref 1.5–7.7)
NEUTROPHILS # BLD AUTO: 3.67 X10(3) UL (ref 1.5–7.7)
NEUTROPHILS NFR BLD AUTO: 61.9 %
NEUTROPHILS NFR BLD AUTO: 63.3 %
OSMOLALITY SERPL CALC.SUM OF ELEC: 291 MOSM/KG (ref 275–295)
OSMOLALITY SERPL CALC.SUM OF ELEC: 292 MOSM/KG (ref 275–295)
PLATELET # BLD AUTO: 169 10(3)UL (ref 150–450)
PLATELET # BLD AUTO: 173 10(3)UL (ref 150–450)
POTASSIUM SERPL-SCNC: 4 MMOL/L (ref 3.5–5.1)
POTASSIUM SERPL-SCNC: 4.6 MMOL/L (ref 3.5–5.1)
PROT SERPL-MCNC: 6.4 G/DL (ref 6.4–8.2)
Q-T INTERVAL: 272 MS
QRS DURATION: 86 MS
QTC CALCULATION (BEZET): 401 MS
R AXIS: 19 DEGREES
RBC # BLD AUTO: 3.91 X10(6)UL
RBC # BLD AUTO: 3.92 X10(6)UL
SODIUM SERPL-SCNC: 140 MMOL/L (ref 136–145)
SODIUM SERPL-SCNC: 140 MMOL/L (ref 136–145)
T AXIS: 77 DEGREES
TROPONIN I HIGH SENSITIVITY: 13 NG/L
TSI SER-ACNC: 1.89 MIU/ML (ref 0.36–3.74)
VENTRICULAR RATE: 131 BPM
WBC # BLD AUTO: 5.8 X10(3) UL (ref 4–11)
WBC # BLD AUTO: 5.9 X10(3) UL (ref 4–11)

## 2023-07-09 PROCEDURE — 71045 X-RAY EXAM CHEST 1 VIEW: CPT

## 2023-07-09 PROCEDURE — 99223 1ST HOSP IP/OBS HIGH 75: CPT | Performed by: INTERNAL MEDICINE

## 2023-07-09 RX ORDER — METOCLOPRAMIDE HYDROCHLORIDE 5 MG/ML
5 INJECTION INTRAMUSCULAR; INTRAVENOUS EVERY 8 HOURS PRN
Status: DISCONTINUED | OUTPATIENT
Start: 2023-07-09 | End: 2023-07-10

## 2023-07-09 RX ORDER — SODIUM CHLORIDE 9 MG/ML
INJECTION, SOLUTION INTRAVENOUS CONTINUOUS
Status: DISCONTINUED | OUTPATIENT
Start: 2023-07-10 | End: 2023-07-10

## 2023-07-09 RX ORDER — ONDANSETRON 2 MG/ML
4 INJECTION INTRAMUSCULAR; INTRAVENOUS EVERY 6 HOURS PRN
Status: DISCONTINUED | OUTPATIENT
Start: 2023-07-09 | End: 2023-07-10

## 2023-07-09 RX ORDER — ATORVASTATIN CALCIUM 10 MG/1
10 TABLET, FILM COATED ORAL NIGHTLY
Status: DISCONTINUED | OUTPATIENT
Start: 2023-07-09 | End: 2023-07-10

## 2023-07-09 RX ORDER — SENNOSIDES 8.6 MG
17.2 TABLET ORAL NIGHTLY PRN
Status: DISCONTINUED | OUTPATIENT
Start: 2023-07-09 | End: 2023-07-10

## 2023-07-09 RX ORDER — ALPRAZOLAM 0.5 MG/1
0.5 TABLET ORAL
Status: DISCONTINUED | OUTPATIENT
Start: 2023-07-09 | End: 2023-07-10

## 2023-07-09 RX ORDER — ENEMA 19; 7 G/133ML; G/133ML
1 ENEMA RECTAL ONCE AS NEEDED
Status: DISCONTINUED | OUTPATIENT
Start: 2023-07-09 | End: 2023-07-10

## 2023-07-09 RX ORDER — ACETAMINOPHEN 500 MG
500 TABLET ORAL EVERY 4 HOURS PRN
Status: DISCONTINUED | OUTPATIENT
Start: 2023-07-09 | End: 2023-07-10

## 2023-07-09 RX ORDER — MELATONIN
3 NIGHTLY PRN
Status: DISCONTINUED | OUTPATIENT
Start: 2023-07-09 | End: 2023-07-10

## 2023-07-09 RX ORDER — AMIODARONE HYDROCHLORIDE 200 MG/1
400 TABLET ORAL 2 TIMES DAILY WITH MEALS
Status: DISCONTINUED | OUTPATIENT
Start: 2023-07-09 | End: 2023-07-10

## 2023-07-09 RX ORDER — AMIODARONE HYDROCHLORIDE 200 MG/1
400 TABLET ORAL 2 TIMES DAILY WITH MEALS
Status: DISCONTINUED | OUTPATIENT
Start: 2023-07-09 | End: 2023-07-09

## 2023-07-09 RX ORDER — SODIUM CHLORIDE 9 MG/ML
INJECTION, SOLUTION INTRAVENOUS CONTINUOUS
Status: DISCONTINUED | OUTPATIENT
Start: 2023-07-09 | End: 2023-07-09

## 2023-07-09 RX ORDER — PANTOPRAZOLE SODIUM 40 MG/1
40 TABLET, DELAYED RELEASE ORAL
Status: DISCONTINUED | OUTPATIENT
Start: 2023-07-09 | End: 2023-07-10

## 2023-07-09 RX ORDER — BENZONATATE 200 MG/1
200 CAPSULE ORAL 3 TIMES DAILY PRN
Status: DISCONTINUED | OUTPATIENT
Start: 2023-07-09 | End: 2023-07-10

## 2023-07-09 RX ORDER — BISACODYL 10 MG
10 SUPPOSITORY, RECTAL RECTAL
Status: DISCONTINUED | OUTPATIENT
Start: 2023-07-09 | End: 2023-07-10

## 2023-07-09 RX ORDER — POLYETHYLENE GLYCOL 3350 17 G/17G
17 POWDER, FOR SOLUTION ORAL DAILY PRN
Status: DISCONTINUED | OUTPATIENT
Start: 2023-07-09 | End: 2023-07-10

## 2023-07-09 RX ORDER — DILTIAZEM HYDROCHLORIDE 5 MG/ML
10 INJECTION INTRAVENOUS ONCE
Status: COMPLETED | OUTPATIENT
Start: 2023-07-09 | End: 2023-07-09

## 2023-07-09 RX ORDER — AMIODARONE HYDROCHLORIDE 100 MG/1
100 TABLET ORAL DAILY
Status: DISCONTINUED | OUTPATIENT
Start: 2023-07-09 | End: 2023-07-09

## 2023-07-09 NOTE — CONSULTS
Cardiology (consult dictated)    Assessment:  1. Presentation with chest tightness and found to be in atrial fibrillation. Initial troponin normal.  No acute EKG abnormalities. 2.  Atrial fibrillation with rapid ventricular response. History of A-fib in the past going back 10 years. Has been on amiodarone but ran out of her medicine 3 months ago. She saw Dr. Romelia Zarate in the past, but has not seen cardiology in over 2 years. He has however been taking her Xarelto daily, without missing any doses. 3.  Hypertension      Plan:  1. Resume amiodarone p.o.    2.  IV Cardizem as needed for rate control. 3.  If still in A-fib in the morning, will perform DC cardioversion. Thank you.

## 2023-07-09 NOTE — PROGRESS NOTES
[facility]    Progress Note    Zandra Dowling Patient Status:  Inpatient    1955 MRN [id number]   Location [facility]5W Attending Yumiko Hernández,*   Hosp Day # 0 PCP Geo Alcantar MD     Chief Complaint: weak, palp    Subjective:     Constitutional:  Positive for fatigue. Negative for activity change and appetite change. HENT:  Negative for congestion. Respiratory:  Negative for cough, choking, chest tightness and shortness of breath. Cardiovascular:  Positive for palpitations. Negative for chest pain. Gastrointestinal:  Negative for abdominal pain, diarrhea and constipation. Genitourinary:  Negative for difficulty urinating. Neurological:  Positive for dizziness, weakness and light-headedness. Psychiatric/Behavioral:  Positive for sleep disturbance. Negative for confusion and decreased concentration. The patient is not nervous/anxious. Objective:   P=112 irir; rr=18; o2 sat 98%; bp=95/61; t=97.8  Physical Exam  Vitals and nursing note reviewed. Constitutional:       General: She is not in acute distress. Appearance: She is not ill-appearing, toxic-appearing or diaphoretic. HENT:      Head: Normocephalic and atraumatic. Cardiovascular:      Rate and Rhythm: Regular rhythm. Tachycardia present. Pulmonary:      Breath sounds: Rhonchi present. Abdominal:      General: Bowel sounds are normal.      Palpations: Abdomen is soft. Tenderness: There is no abdominal tenderness. Skin:     General: Skin is warm and dry. Capillary Refill: Capillary refill takes less than 2 seconds. Neurological:      General: No focal deficit present. Mental Status: She is alert and oriented to person, place, and time.    Psychiatric:         Behavior: Behavior normal.         Results:   Lab Results   Component Value Date    WBC 5.9 2023    HGB 11.9 (L) 2023    HCT 35.8 2023    .0 2023    CREATSERUM 1.07 (H) 07/09/2023    BUN 15 07/09/2023     07/09/2023    K 4.6 07/09/2023     07/09/2023    CO2 30.0 07/09/2023     (H) 07/09/2023    CA 9.1 07/09/2023    ALB 3.1 (L) 07/09/2023    ALKPHO 63 07/09/2023    BILT 0.3 07/09/2023    TP 6.4 07/09/2023    AST 14 (L) 07/09/2023    ALT 17 07/09/2023    PTT 27.5 07/08/2020    TSH 1.890 07/09/2023    ESRML 16 05/23/2023    MG 2.3 07/09/2023    TROPHS 13 07/09/2023       XR CHEST AP PORTABLE  (CPT=71045)    Result Date: 7/9/2023  CONCLUSION: No acute cardiopulmonary abnormality. [facility] provided a preliminary report for this examination. This final report agrees with their preliminary findings. Dictated by (CST): Iwona Dickerson MD on 7/09/2023 at 6:44 AM     Finalized by (CST): Iwona Dickerson MD on 7/09/2023 at 6:45 AM         EKG 12 Lead    Result Date: 7/9/2023  Atrial fibrillation with rapid ventricular response Nonspecific ST abnormality Abnormal ECG No previous ECGs found in [facility]     Assessment & Plan:      Afib with RVR  Patient currently on Xarelto  Takes metoprolol 25 mg PO BID at home  Started on Diltiazem drip in ER  Cardiology on consult  Telemetry monitoring still rr afib 112 poss home tomorrow with outpt stress test; dc cardioversion  HTN  HLD  Continue home meds        Quality:  DVT Prophylaxis: Xarelto  CODE status: Not on file  Zamarripa: No     Post mn 35 min spent        Patient will require inpatient services that will reasonably be expected to span two midnight's based on the clinical documentation in H+P. Based on patients current state of illness, I anticipate that, after discharge, patient will require TBD.

## 2023-07-09 NOTE — ED PROVIDER NOTES
Patient with a history of A-fib, hyperlipidemia, hypertension, DVT on Xarelto here with some chest tightness for 2 days. It radiates to the back. She did take a double dose of her beta-blocker this evening before coming in. She is not in any significant pain at this time. She was found to be in A-fib RVR on the monitor. She is accompanied by her daughter who helps translate the Hebrew for me. Patient was evaluated first by [facility] LEANDRO. Diltiazem will be given and patient will be admitted. Her first troponin was normal.  I agree with his assessment and plan.

## 2023-07-09 NOTE — CONSULTS
[facility]    PATIENT'S NAME: Serafin Oliva   ATTENDING PHYSICIAN: Quirino Hernández MD   CONSULTING PHYSICIAN: Delmy Drake MD   PATIENT ACCOUNT#:   [id number]    LOCATION:  [facility] [address] #:   [id number]       YOB: 1955  ADMISSION DATE:       07/09/2023      CONSULT DATE:  07/09/2023    REPORT OF CONSULTATION      HISTORY OF PRESENT ILLNESS:  The patient is a pleasant 60-year-old female who developed anterior chest pressure last evening. It has actually been going on for a couple of days and when it woke her up during the night feeling worse, she came to the emergency room for evaluation. She was found to be in rapid atrial fibrillation. She denies having any dizziness, shortness of breath, or palpitations. She has a long history of atrial fibrillation going back 10 years. She has had no clinical episodes for several years. She has been on amiodarone for rhythm control, but ran out of it 3 months ago. She has also been on Xarelto for stroke prevention, and has been taking that faithfully for several years. She does not have a history of coronary artery disease. She had a nuclear stress test and an echocardiogram in 2020, both of which were normal.  There were done preop for knee surgery. PAST MEDICAL HISTORY:  Hypertension, dyslipidemia, atrial fibrillation as above. history of DVT following her leg surgeries, hypothyroidism. PAST SURGICAL HISTORY:  Appendectomy, 3 C sections, hemorrhoids, and a breast lumpectomy. SOCIAL HISTORY:  She is , has 3 children. Does not smoke or drink alcohol. MEDICATIONS:  Home medications Losartan 50 b.i.d., pantoprazole 40 q.a.m., atorvastatin 10 at bedtime, Xarelto 20 every evening., Metoprolol tartrate 25 mg b.i.d., amiodarone 100 mg daily though this has been off of her med list for 3 months as discussed above, thyroid supplement 60 mg daily, vitamin D and Flonase.     ALLERGIES:  Iodine contrast.    FAMILY HISTORY:  Noncontributory. REVIEW OF SYSTEMS:  Negative except for the above. PHYSICAL EXAMINATION:    GENERAL:  Well-developed, well-nourished female in no acute distress. Alert and oriented x3. VITAL SIGNS:  Blood pressure 110/60, respirations 18, pulse 110, irregular rhythm. NECK:  Veins normal.  Carotids brisk without bruits. No thyromegaly or adenopathy. LUNGS:  Clear bilaterally. HEART:  S1 and S2 are normal.  There is no gallop, murmur, rub or click. ABDOMEN:  Benign. EXTREMITIES:  Warm and dry. Good pulses. No edema. LABORATORY DATA:  EKG shows atrial fibrillation with rapid response. No other acute changes. Sodium 140, potassium 4.6, bicarb 30, BUN 15, creatinine 1.07, GFR 56. Hemoglobin 11.9. Chest x-ray is normal    ASSESSMENT:    1. Atrial fibrillation with rapid ventricular response. 2.   Chest tightness, likely due to the tachycardia and atrial fibrillation but rule out underlying coronary artery disease as substrate. 3.   Hypertension, controlled. PLAN:    1. Resume amiodarone p.o.  2.   IV Cardizem as needed for rate control. 3.   If still in atrial fibrillation in the morning, we will perform DC cardioversion. 4.   Echocardiogram.  5.   Will need a nuclear stress test at some point, but I believe this can be done in the early post discharge timeframe. Thank you for this consultation. Dictated By Sharmila Guillermo.  Trina Springer MD  d: 07/09/2023 11:22:50  t: 07/09/2023 12:28:03  Enrique Moses [id number]  [facility]/

## 2023-07-09 NOTE — ED INITIAL ASSESSMENT (HPI)
S: pt started feeling chest pain this evening and racing hr.   B: afib on anticoagulation  A: non toxic appearing, EKG afib with rvr in 130's  R: protocol

## 2023-07-09 NOTE — PLAN OF CARE
Pt admitted from ed. No complaints, used. In afib on tele, rate low 100's. Updated on plan of care. Educated on room and call light. Plan for cardio consult. Bed alarm on call light in reach. Problem: Patient Centered Care  Goal: Patient preferences are identified and integrated in the patient's plan of care  Description: Interventions:  - What would you like us to know as we care for you?  Home with family  - Provide timely, complete, and accurate information to patient/family  - Incorporate patient and family knowledge, values, beliefs, and cultural backgrounds into the planning and delivery of care  - Encourage patient/family to participate in care and decision-making at the level they choose  - Honor patient and family perspectives and choices  Outcome: Progressing     Problem: Patient/Family Goals  Goal: Patient/Family Long Term Goal  Description: Patient's Long Term Goal no pain in chest    Interventions:  - monitor   - medication  - See additional Care Plan goals for specific interventions  Outcome: Progressing  Goal: Patient/Family Short Term Goal  Description: Patient's Short Term Goal: dc safely    Interventions:   - cardiology consult  - See additional Care Plan goals for specific interventions  Outcome: Progressing     Problem: PAIN - ADULT  Goal: Verbalizes/displays adequate comfort level or patient's stated pain goal  Description: INTERVENTIONS:  - Encourage pt to monitor pain and request assistance  - Assess pain using appropriate pain scale  - Administer analgesics based on type and severity of pain and evaluate response  - Implement non-pharmacological measures as appropriate and evaluate response  - Consider cultural and social influences on pain and pain management  - Manage/alleviate anxiety  - Utilize distraction and/or relaxation techniques  - Monitor for opioid side effects  - Notify MD/LIP if interventions unsuccessful or patient reports new pain  - Anticipate increased pain with activity and pre-medicate as appropriate  Outcome: Progressing     Problem: CARDIOVASCULAR - ADULT  Goal: Maintains optimal cardiac output and hemodynamic stability  Description: INTERVENTIONS:  - Monitor vital signs, rhythm, and trends  - Monitor for bleeding, hypotension and signs of decreased cardiac output  - Evaluate effectiveness of vasoactive medications to optimize hemodynamic stability  - Monitor arterial and/or venous puncture sites for bleeding and/or hematoma  - Assess quality of pulses, skin color and temperature  - Assess for signs of decreased coronary artery perfusion - ex.  Angina  - Evaluate fluid balance, assess for edema, trend weights  Outcome: Progressing  Goal: Absence of cardiac arrhythmias or at baseline  Description: INTERVENTIONS:  - Continuous cardiac monitoring, monitor vital signs, obtain 12 lead EKG if indicated  - Evaluate effectiveness of antiarrhythmic and heart rate control medications as ordered  - Initiate emergency measures for life threatening arrhythmias  - Monitor electrolytes and administer replacement therapy as ordered  Outcome: Progressing

## 2023-07-09 NOTE — PLAN OF CARE
Pt was seen by cardiology this morning, amiodarone dose increased from 100mg daily to 400mg BID. HR under much better control now. No complaints of chest tightness/pain today. Tylenol given this evening for headache which is chronic for her. Plan for cardioversion tomorrow, consent signed, NPO midnight. Daughter present at bedside throughout today and updated on plan of care. Problem: Patient Centered Care  Goal: Patient preferences are identified and integrated in the patient's plan of care  Description: Interventions:  - What would you like us to know as we care for you?  Home with family  - Provide timely, complete, and accurate information to patient/family  - Incorporate patient and family knowledge, values, beliefs, and cultural backgrounds into the planning and delivery of care  - Encourage patient/family to participate in care and decision-making at the level they choose  - Honor patient and family perspectives and choices  Outcome: Progressing     Problem: Patient/Family Goals  Goal: Patient/Family Long Term Goal  Description: Patient's Long Term Goal no pain in chest    Interventions:  - monitor   - medication  - See additional Care Plan goals for specific interventions  Outcome: Progressing  Goal: Patient/Family Short Term Goal  Description: Patient's Short Term Goal: dc safely    Interventions:   - cardiology consult  - See additional Care Plan goals for specific interventions  Outcome: Progressing     Problem: PAIN - ADULT  Goal: Verbalizes/displays adequate comfort level or patient's stated pain goal  Description: INTERVENTIONS:  - Encourage pt to monitor pain and request assistance  - Assess pain using appropriate pain scale  - Administer analgesics based on type and severity of pain and evaluate response  - Implement non-pharmacological measures as appropriate and evaluate response  - Consider cultural and social influences on pain and pain management  - Manage/alleviate anxiety  - Utilize distraction and/or relaxation techniques  - Monitor for opioid side effects  - Notify MD/LIP if interventions unsuccessful or patient reports new pain  - Anticipate increased pain with activity and pre-medicate as appropriate  Outcome: Progressing     Problem: CARDIOVASCULAR - ADULT  Goal: Maintains optimal cardiac output and hemodynamic stability  Description: INTERVENTIONS:  - Monitor vital signs, rhythm, and trends  - Monitor for bleeding, hypotension and signs of decreased cardiac output  - Evaluate effectiveness of vasoactive medications to optimize hemodynamic stability  - Monitor arterial and/or venous puncture sites for bleeding and/or hematoma  - Assess quality of pulses, skin color and temperature  - Assess for signs of decreased coronary artery perfusion - ex.  Angina  - Evaluate fluid balance, assess for edema, trend weights  Outcome: Progressing  Goal: Absence of cardiac arrhythmias or at baseline  Description: INTERVENTIONS:  - Continuous cardiac monitoring, monitor vital signs, obtain 12 lead EKG if indicated  - Evaluate effectiveness of antiarrhythmic and heart rate control medications as ordered  - Initiate emergency measures for life threatening arrhythmias  - Monitor electrolytes and administer replacement therapy as ordered  Outcome: Progressing

## 2023-07-09 NOTE — PLAN OF CARE
Seda Quintana Patient Status:  Emergency    1955 MRN [id number]   Location [address] Attending No att. providers found   Hosp Day # 0 PCP Tamela Cash MD     Cardiology Nocturnal APN Note    Page Received: ED MD 8401    Briefly: (Documentation from chart review)     Korin Stevens is a 69 yo F with PMH/PSH of Afib, HTN, HLD, DVT on xarelto presented to the ED c/o multiple day hx of chest tightness. In the ED, patient in AF RVR EKG , troponin 13. Cardiology consulted for AF RVR. In the ED, patient received IVP dilt but not started on gtt d/t borderline BP.      Primary Cardiologist No cardiologist on record; Seen IP only by [facility]    Vital Signs:       2023     3:38 AM 2023     3:45 AM   Vitals History   /86 102/78   Pulse  105   Resp  20   SpO2  96 %        Labs:   Lab Results   Component Value Date    WBC 5.8 2023    HGB 11.9 2023    HCT 36.1 2023    .0 2023    CREATSERUM 1.03 2023    BUN 16 2023     2023    K 4.0 2023     2023    CO2 26.0 2023     2023    CA 9.2 2023    ALB 3.1 2023    ALKPHO 63 2023    BILT 0.3 2023    TP 6.4 2023    AST 14 2023    ALT 17 2023    TROPHS 13 2023       Diagnostics:         Allergies:    Radiology Contrast *    UNKNOWN    Comment:Unable to recall    Medications:    [id number]    Assessment:   #AF RVR with known hx of AF on metoprolol; Nuc stress  normal   #DVT / TIA on xarelto  #HTN on losartan  #HLD on statin  #Hypothyroid on NP thyroid       Plan:  - Dilt gtt   - Continuous telemetry   - Evaluate for echo in AM   - Review and resume home meds  - Add on TSH  - Continue to monitor overnight  - Formal Cardiology consult to follow in AM.       Federico Oglesby, [address]  2023  4:40 AM

## 2023-07-10 ENCOUNTER — APPOINTMENT (OUTPATIENT)
Dept: CV DIAGNOSTICS | Facility: HOSPITAL | Age: 68
End: 2023-07-10
Attending: INTERNAL MEDICINE
Payer: MEDICARE

## 2023-07-10 VITALS
HEART RATE: 55 BPM | TEMPERATURE: 98 F | RESPIRATION RATE: 18 BRPM | WEIGHT: 180 LBS | OXYGEN SATURATION: 94 % | BODY MASS INDEX: 27 KG/M2 | SYSTOLIC BLOOD PRESSURE: 97 MMHG | DIASTOLIC BLOOD PRESSURE: 38 MMHG

## 2023-07-10 LAB
ANION GAP SERPL CALC-SCNC: 2 MMOL/L (ref 0–18)
BASOPHILS # BLD AUTO: 0.01 X10(3) UL (ref 0–0.2)
BASOPHILS NFR BLD AUTO: 0.2 %
BUN BLD-MCNC: 16 MG/DL (ref 7–18)
BUN/CREAT SERPL: 14.7 (ref 10–20)
CALCIUM BLD-MCNC: 8.8 MG/DL (ref 8.5–10.1)
CHLORIDE SERPL-SCNC: 108 MMOL/L (ref 98–112)
CO2 SERPL-SCNC: 29 MMOL/L (ref 21–32)
CREAT BLD-MCNC: 1.09 MG/DL
DEPRECATED RDW RBC AUTO: 44.9 FL (ref 35.1–46.3)
EOSINOPHIL # BLD AUTO: 0.24 X10(3) UL (ref 0–0.7)
EOSINOPHIL NFR BLD AUTO: 4.6 %
ERYTHROCYTE [DISTWIDTH] IN BLOOD BY AUTOMATED COUNT: 12.9 % (ref 11–15)
GFR SERPLBLD BASED ON 1.73 SQ M-ARVRAT: 55 ML/MIN/1.73M2 (ref 60–?)
GLUCOSE BLD-MCNC: 102 MG/DL (ref 70–99)
HCT VFR BLD AUTO: 35.4 %
HGB BLD-MCNC: 11.3 G/DL
IMM GRANULOCYTES # BLD AUTO: 0.01 X10(3) UL (ref 0–1)
IMM GRANULOCYTES NFR BLD: 0.2 %
LYMPHOCYTES # BLD AUTO: 1.47 X10(3) UL (ref 1–4)
LYMPHOCYTES NFR BLD AUTO: 28.4 %
MAGNESIUM SERPL-MCNC: 2.1 MG/DL (ref 1.6–2.6)
MCH RBC QN AUTO: 30.1 PG (ref 26–34)
MCHC RBC AUTO-ENTMCNC: 31.9 G/DL (ref 31–37)
MCV RBC AUTO: 94.1 FL
MONOCYTES # BLD AUTO: 0.6 X10(3) UL (ref 0.1–1)
MONOCYTES NFR BLD AUTO: 11.6 %
NEUTROPHILS # BLD AUTO: 2.84 X10 (3) UL (ref 1.5–7.7)
NEUTROPHILS # BLD AUTO: 2.84 X10(3) UL (ref 1.5–7.7)
NEUTROPHILS NFR BLD AUTO: 55 %
OSMOLALITY SERPL CALC.SUM OF ELEC: 289 MOSM/KG (ref 275–295)
PHOSPHATE SERPL-MCNC: 2.7 MG/DL (ref 2.5–4.9)
PLATELET # BLD AUTO: 166 10(3)UL (ref 150–450)
POTASSIUM SERPL-SCNC: 4.4 MMOL/L (ref 3.5–5.1)
RBC # BLD AUTO: 3.76 X10(6)UL
SODIUM SERPL-SCNC: 139 MMOL/L (ref 136–145)
TROPONIN I HIGH SENSITIVITY: 11 NG/L
WBC # BLD AUTO: 5.2 X10(3) UL (ref 4–11)

## 2023-07-10 PROCEDURE — 93306 TTE W/DOPPLER COMPLETE: CPT | Performed by: INTERNAL MEDICINE

## 2023-07-10 PROCEDURE — 99239 HOSP IP/OBS DSCHRG MGMT >30: CPT | Performed by: HOSPITALIST

## 2023-07-10 RX ORDER — METOPROLOL SUCCINATE 25 MG/1
25 TABLET, EXTENDED RELEASE ORAL DAILY
Qty: 30 TABLET | Refills: 2 | Status: SHIPPED | OUTPATIENT
Start: 2023-07-10

## 2023-07-10 RX ORDER — ACETAMINOPHEN 500 MG
500 TABLET ORAL EVERY 6 HOURS PRN
Qty: 1 TABLET | Refills: 0 | Status: SHIPPED | COMMUNITY
Start: 2023-07-10

## 2023-07-10 RX ORDER — AMIODARONE HYDROCHLORIDE 200 MG/1
100 TABLET ORAL DAILY
Qty: 90 TABLET | Refills: 3 | Status: SHIPPED | OUTPATIENT
Start: 2023-07-10

## 2023-07-10 NOTE — DISCHARGE INSTRUCTIONS
Ok to go home  Fu cards outpt stress  No heavy exercise till stress complete     Medication List        START taking these medications      acetaminophen 500 MG Tabs  Commonly known as: Tylenol Extra Strength     metoprolol succinate ER 25 MG Tb24  Commonly known as: Toprol XL  Take 1 tablet (25 mg total) by mouth daily. Sennosides 17.2 MG Tabs  Take 1 tablet (17.2 mg total) by mouth nightly as needed (constipation, as needed if no bowel movement that day). CONTINUE taking these medications      ALPRAZolam 0.5 MG Tabs  Commonly known as: Xanax  Take 1 tablet (0.5 mg total) by mouth daily as needed. amiodarone 200 MG Tabs  Commonly known as: Pacerone  Take 0.5 tablets (100 mg total) by mouth daily. atorvastatin 10 MG Tabs  Commonly known as: Lipitor  Take 1 tablet (10 mg total) by mouth nightly. pantoprazole 40 MG Tbec  Commonly known as: Protonix  Take 1 tablet (40 mg total) by mouth before breakfast.     rivaroxaban 20 MG Tabs  Commonly known as: Xarelto  Take 1 tablet (20 mg total) by mouth daily with food.             STOP taking these medications      busPIRone 10 MG Tabs  Commonly known as: Buspar     ergocalciferol 1.25 MG (70589 UT) Caps  Commonly known as: Vitamin D2     fluticasone propionate 50 MCG/ACT Susp  Commonly known as: Flonase     HYDROcodone-acetaminophen 5-325 MG Tabs  Commonly known as: Norco     losartan 50 MG Tabs  Commonly known as: Cozaar     metoprolol tartrate 25 MG Tabs  Commonly known as: Lopressor     NP Thyroid 60 MG Tabs  Generic drug: thyroid               Where to Get Your Medications        These medications were sent to [place] [address] #[id number] Willeen Schilder, [address] [place] [address], [phone], [phone]   [facility] [address]      Phone: [phone]   Sennosides 17.2 MG Tabs       You can get these medications from any pharmacy    Bring a paper prescription for each of these medications  metoprolol succinate ER 25 MG Tb24

## 2023-07-10 NOTE — PLAN OF CARE
No complaints overnight. Pt npo at midnight for poss cardioversion. Pt remained in sinus rhythm, 50's bpm. Call light in reach, ibed on. Problem: Patient Centered Care  Goal: Patient preferences are identified and integrated in the patient's plan of care  Description: Interventions:  - What would you like us to know as we care for you?  Home with family  - Provide timely, complete, and accurate information to patient/family  - Incorporate patient and family knowledge, values, beliefs, and cultural backgrounds into the planning and delivery of care  - Encourage patient/family to participate in care and decision-making at the level they choose  - Honor patient and family perspectives and choices  Outcome: Progressing     Problem: Patient/Family Goals  Goal: Patient/Family Long Term Goal  Description: Patient's Long Term Goal no pain in chest    Interventions:  - monitor   - medication  - See additional Care Plan goals for specific interventions  Outcome: Progressing  Goal: Patient/Family Short Term Goal  Description: Patient's Short Term Goal: dc safely    Interventions:   - cardiology consult  - See additional Care Plan goals for specific interventions  Outcome: Progressing     Problem: PAIN - ADULT  Goal: Verbalizes/displays adequate comfort level or patient's stated pain goal  Description: INTERVENTIONS:  - Encourage pt to monitor pain and request assistance  - Assess pain using appropriate pain scale  - Administer analgesics based on type and severity of pain and evaluate response  - Implement non-pharmacological measures as appropriate and evaluate response  - Consider cultural and social influences on pain and pain management  - Manage/alleviate anxiety  - Utilize distraction and/or relaxation techniques  - Monitor for opioid side effects  - Notify MD/LIP if interventions unsuccessful or patient reports new pain  - Anticipate increased pain with activity and pre-medicate as appropriate  Outcome: Progressing Problem: CARDIOVASCULAR - ADULT  Goal: Maintains optimal cardiac output and hemodynamic stability  Description: INTERVENTIONS:  - Monitor vital signs, rhythm, and trends  - Monitor for bleeding, hypotension and signs of decreased cardiac output  - Evaluate effectiveness of vasoactive medications to optimize hemodynamic stability  - Monitor arterial and/or venous puncture sites for bleeding and/or hematoma  - Assess quality of pulses, skin color and temperature  - Assess for signs of decreased coronary artery perfusion - ex.  Angina  - Evaluate fluid balance, assess for edema, trend weights  Outcome: Progressing  Goal: Absence of cardiac arrhythmias or at baseline  Description: INTERVENTIONS:  - Continuous cardiac monitoring, monitor vital signs, obtain 12 lead EKG if indicated  - Evaluate effectiveness of antiarrhythmic and heart rate control medications as ordered  - Initiate emergency measures for life threatening arrhythmias  - Monitor electrolytes and administer replacement therapy as ordered  Outcome: Progressing

## 2023-07-10 NOTE — DISCHARGE SUMMARY
Dc summary#[id number]  > 30 min spent on [address] Discharge Diagnoses: rr afib    Lace+ Score: 37  59-90 High Risk  29-58 Medium Risk  0-28   Low Risk. TCM Follow-Up Recommendation:  LACE 29-58:  Moderate Risk of readmission after discharge from the hospital.tcm fu recommended

## 2023-07-11 ENCOUNTER — PATIENT OUTREACH (OUTPATIENT)
Dept: CASE MANAGEMENT | Age: 68
End: 2023-07-11

## 2023-07-11 DIAGNOSIS — Z02.9 ENCOUNTERS FOR ADMINISTRATIVE PURPOSE: ICD-10-CM

## 2023-07-11 DIAGNOSIS — I48.91 ATRIAL FIBRILLATION WITH RAPID VENTRICULAR RESPONSE (HCC): Primary | ICD-10-CM

## 2023-07-11 PROCEDURE — 1111F DSCHRG MED/CURRENT MED MERGE: CPT

## 2023-07-11 NOTE — DISCHARGE SUMMARY
[facility]    PATIENT'S NAME: Michael Barajas   ATTENDING PHYSICIAN: Quirino Hernández MD   PATIENT ACCOUNT#:   [id number]    LOCATION:  [address] [place] [address] Road #:   [id number]       YOB: 1955  ADMISSION DATE:       07/09/2023      DISCHARGE DATE:  07/10/2023    DISCHARGE SUMMARY      About 45 minutes were spent preparing this discharge. DISCHARGE DIAGNOSIS:  Rapid response, atrial fibrillation. HISTORY AND HOSPITAL COURSE:  This is a very pleasant Russian-speaking 69-year-old  female who appears at least 13 years younger than her stated age. She presents with a history of chest tightness. She had a previous history of atrial fibrillation and she was found to be in atrial fibrillation with rapid ventricular response. She was started on Cardizem drip. She restarted on her metoprolol. She was anticoagulated already with Xarelto and was seen by Dr. Candy Henley who adjusted her medications and ordered an echocardiogram.  If her atrial fibrillation persisted, we will consider a cardioversion. If her echo was okay, she will be discharged home. She did well and was discharged home on amiodarone, which she had stopped taking 3 months ago because she ran out. PHYSICAL EXAMINATION:    VITAL SIGNS:  On discharge, temperature is 97.9, pulse 55, respiratory rate 18, blood pressure 97/38, 94%. LUNGS:  Occasional rhonchi. HEART:  Normal S1, S2. No S3.  ABDOMEN:  Soft. EXTREMITIES:  Without significant calf tenderness. NEUROLOGIC:  Alert and oriented, friendly and cooperative and through her daughter  with her permission, she understands all my discharge instructions. LABORATORY STUDIES:  Please see chart. ASSESSMENT AND PLAN:    1. Atrial fibrillation with rapid ventricular response. Continue medications. 2.   Hypertension. 3.   Hyperlipidemia. 4.   DVT prophylaxis. Xarelto. CONDITION ON DISCHARGE:  Stable.     CODE STATUS:  Not discussed during this hospitalization. ACTIVITY:  No heavy exercise. Otherwise, as tolerated. FOLLOWUP:  With Dr. Scooby López in a few days. Follow up with Dr. Spencer Patel as he wishes. Follow up with Dr. Estela Rangel for headache management. She did not complain of headache to me, but this was added by someone without my knowledge. Follow up with outpatient cardiac stress test.  No heavy exercise until the stress test complete. DISCHARGE MEDICATIONS:    1. Alprazolam 0.5 mg daily as needed. Watch for drowsiness. No alcohol. 2.   Amiodarone 100 mg daily. This medicine will be ordered, dosed, renewed exclusively by Cardiology. 3.   Atorvastatin 10 mg nightly. Watch for muscle weakness. 4.   Protonix 40 mg daily. 5.   Xarelto 20 mg daily . 6. Tylenol 500 mg every 6 hours as needed. Watch total daily Tylenol limit of 3 g.   7.   Lopressor 25 mg daily. 8.   Senokot 1 tablet nightly as needed for constipation. RISK OF READMISSION:  Moderate. TCM followup recommended. Dictated By Divina Rm.  MD Edgar  d: 07/10/2023 16:43:40  t: 07/11/2023 03:05:22  Job [id number]  [facility]/

## 2023-07-18 ENCOUNTER — OFFICE VISIT (OUTPATIENT)
Dept: INTERNAL MEDICINE CLINIC | Facility: CLINIC | Age: 68
End: 2023-07-18

## 2023-07-18 VITALS
BODY MASS INDEX: 26.85 KG/M2 | HEIGHT: 68 IN | HEART RATE: 55 BPM | DIASTOLIC BLOOD PRESSURE: 61 MMHG | SYSTOLIC BLOOD PRESSURE: 104 MMHG | WEIGHT: 177.19 LBS | RESPIRATION RATE: 16 BRPM

## 2023-07-18 DIAGNOSIS — I10 ESSENTIAL HYPERTENSION: ICD-10-CM

## 2023-07-18 DIAGNOSIS — D50.8 OTHER IRON DEFICIENCY ANEMIA: ICD-10-CM

## 2023-07-18 DIAGNOSIS — I48.0 PAROXYSMAL ATRIAL FIBRILLATION (HCC): Primary | Chronic | ICD-10-CM

## 2023-07-18 DIAGNOSIS — Z12.11 COLON CANCER SCREENING: ICD-10-CM

## 2023-07-18 PROCEDURE — 1111F DSCHRG MED/CURRENT MED MERGE: CPT | Performed by: INTERNAL MEDICINE

## 2023-07-18 PROCEDURE — 1126F AMNT PAIN NOTED NONE PRSNT: CPT | Performed by: INTERNAL MEDICINE

## 2023-07-18 PROCEDURE — 99495 TRANSJ CARE MGMT MOD F2F 14D: CPT | Performed by: INTERNAL MEDICINE

## 2023-07-19 DIAGNOSIS — F41.9 ANXIETY: ICD-10-CM

## 2023-07-20 NOTE — TELEPHONE ENCOUNTER
Please review. Protocol failed or has no protocol.     Requested Prescriptions   Pending Prescriptions Disp Refills    ALPRAZOLAM 0.5 MG Oral Tab [Pharmacy Med Name: ALPRAZOLAM 0.5MG TABLETS] 30 tablet 0     Sig: TAKE 1 TABLET(0.5 MG) BY MOUTH DAILY AS NEEDED       There is no refill protocol information for this order          Recent Outpatient Visits              2 days ago Paroxysmal atrial fibrillation [facility])    [address] [facility] Delores Song MD    Office Visit    1 month ago [facility]    [facility] Visit    5 months ago Frequency of urination    [facility], [address] [place] [address] Roxane Caceres PA-C    Office Visit    1 year ago [facility] hypertension    [facility]    [facility]    1 year ago [facility]ox[facility] atrial [facility]    [facility]            Future Appointments         Provider Department Appt Notes    In 1 month MARYAN IVETT RM1; MARYAN DEXA [address]     In 1 month ADTG DEXA RM1; ADO IVETT [address] [facility]

## 2023-07-22 RX ORDER — ALPRAZOLAM 0.5 MG/1
0.5 TABLET ORAL
Qty: 30 TABLET | Refills: 0 | Status: SHIPPED | OUTPATIENT
Start: 2023-07-22

## 2023-08-24 NOTE — TELEPHONE ENCOUNTER
Please review; protocol failed.     Requested Prescriptions   Pending Prescriptions Disp Refills    XARELTO 20 MG Oral Tab [Pharmacy Med Name: Alessandra Bales 20MG TABLETS] 90 tablet 1     Sig: TAKE 1 TABLET(20 MG) BY MOUTH DAILY WITH FOOD       There is no refill protocol information for this order        Future Appointments         Provider Department Appt Notes    In 2 weeks ADO IVETT RM1; ADO DEXA [address]     In 2 weeks ADO DEXA RM1; ADO IVETT [address] [facility]           Recent Outpatient Visits              1 month ago Paroxysmal atrial fibrillation [facility])    Criselda Elizabeth MD    Office Visit    3 months ago Multiple joint pain    Criselda Elizabeth MD    Office Visit    6 months ago Frequency of urination    Romeo Elizabeth PA-C    Office Visit    1 year ago Essential hypertension    Criselda Elizabeth MD    Office Visit    1 year ago Paroxysmal atrial fibrillation [facility])    Natalia Elizabeth MD    Office Visit

## 2023-08-30 DIAGNOSIS — F41.9 ANXIETY: ICD-10-CM

## 2023-08-30 RX ORDER — ALPRAZOLAM 0.5 MG/1
0.5 TABLET ORAL
Qty: 30 TABLET | Refills: 0 | Status: SHIPPED | OUTPATIENT
Start: 2023-08-30

## 2023-10-06 ENCOUNTER — MED REC SCAN ONLY (OUTPATIENT)
Dept: INTERNAL MEDICINE CLINIC | Facility: CLINIC | Age: 68
End: 2023-10-06

## 2023-10-10 DIAGNOSIS — F41.9 ANXIETY: ICD-10-CM

## 2023-10-11 RX ORDER — ALPRAZOLAM 0.5 MG/1
0.5 TABLET ORAL
Qty: 30 TABLET | Refills: 0 | Status: SHIPPED | OUTPATIENT
Start: 2023-10-11

## 2023-10-11 NOTE — TELEPHONE ENCOUNTER
Please review. Protocol failed / No protocol.    Requested Prescriptions   Pending Prescriptions Disp Refills    ALPRAZOLAM 0.5 MG Oral Tab [Pharmacy Med Name: ALPRAZOLAM 0.5MG TABLETS] 30 tablet 0     Sig: TAKE 1 TABLET(0.5 MG) BY MOUTH DAILY AS NEEDED       There is no refill protocol information for this order         Recent Outpatient Visits              2 months ago Paroxysmal atrial fibrillation [facility])    Sarath Self MD    Office Visit    4 months ago Multiple joint pain    Sarath Self MD    Office Visit    7 months ago Frequency of urination    Shivam Self PA-C    Office Visit    1 year ago Essential hypertension    Sarath Self MD    Office Visit    2 years ago Paroxysmal atrial fibrillation [facility])    Fernie Self MD    Office Visit

## 2023-12-05 DIAGNOSIS — F41.9 ANXIETY: ICD-10-CM

## 2023-12-06 RX ORDER — ALPRAZOLAM 0.5 MG/1
0.5 TABLET ORAL
Qty: 30 TABLET | Refills: 0 | Status: SHIPPED | OUTPATIENT
Start: 2023-12-06

## 2023-12-06 NOTE — TELEPHONE ENCOUNTER
Please review. Protocol failed or has no protocol.     Requested Prescriptions   Pending Prescriptions Disp Refills    ALPRAZOLAM 0.5 MG Oral Tab [Pharmacy Med Name: ALPRAZOLAM 0.5MG TABLETS] 30 tablet 0     Sig: TAKE 1 TABLET(0.5 MG) BY MOUTH DAILY AS NEEDED       There is no refill protocol information for this order          Recent Outpatient Visits              4 months ago Paroxysmal atrial fibrillation [facility])    Bill Dumas MD    Office Visit    6 months ago Multiple joint pain    Bill Dumas MD    Office Visit    9 months ago Frequency of urination    Marquita Dumas PA-C    Office Visit    1 year ago Essential hypertension    Bill Dumas MD    Office Visit    2 years ago Paroxysmal atrial fibrillation [facility])    Ruddy Dumas MD    Office Visit

## 2023-12-18 ENCOUNTER — MED REC SCAN ONLY (OUTPATIENT)
Dept: INTERNAL MEDICINE CLINIC | Facility: CLINIC | Age: 68
End: 2023-12-18

## 2023-12-29 DIAGNOSIS — K21.9 GASTROESOPHAGEAL REFLUX DISEASE, UNSPECIFIED WHETHER ESOPHAGITIS PRESENT: ICD-10-CM

## 2023-12-29 RX ORDER — PANTOPRAZOLE SODIUM 40 MG/1
40 TABLET, DELAYED RELEASE ORAL
Qty: 90 TABLET | Refills: 3 | OUTPATIENT
Start: 2023-12-29

## 2023-12-30 NOTE — TELEPHONE ENCOUNTER
Duplicate Refill Request / Refill too soon PA submitted to [place] for [employer] G6 sensors #9 per 90 days     Key: [id number]  PT Id: [id number]  PA Case ID: [id number]

## 2024-02-21 ENCOUNTER — TELEPHONE (OUTPATIENT)
Dept: INTERNAL MEDICINE CLINIC | Facility: CLINIC | Age: 69
End: 2024-02-21

## 2024-02-24 ENCOUNTER — TELEPHONE (OUTPATIENT)
Dept: INTERNAL MEDICINE CLINIC | Facility: CLINIC | Age: 69
End: 2024-02-24

## 2024-02-24 NOTE — TELEPHONE ENCOUNTER
Patient's daughter called requesting Alprazolam refill as she has not been to able to sleep at night for a couple of WEEKS. I said this can wait till Monday, and she requested to inform her pcp Dr. Rafaela Mccord MD. Will forward.

## 2024-02-27 ENCOUNTER — TELEPHONE (OUTPATIENT)
Dept: INTERNAL MEDICINE CLINIC | Facility: CLINIC | Age: 69
End: 2024-02-27

## 2024-02-27 NOTE — TELEPHONE ENCOUNTER
Daughter, Ashley contacted to update so far ok to keep appt.     Will get confirmation from Elena Naylor to be sure tomorrow.

## 2024-02-27 NOTE — TELEPHONE ENCOUNTER
4 Eyes Skin Assessment Completed by Ernesto UNDERWOOD RN and Constance TERRAZAS RN.    Head WDL  Ears WDL  Nose WDL  Mouth WDL  Neck WDL  Breast/Chest WDL  Shoulder Blades WDL  Spine WDL, Bruising to R upper back   (R) Arm/Elbow/Hand Bruising and Abrasion, R upper arm skin tear   (L) Arm/Elbow/Hand Bruising and Abrasion, L upper arm Bruise  Abdomen WDL  Groin WDL  Scrotum/Coccyx/Buttocks WDL  (R) Leg Abrasion (knee)  (L) Leg WDL  (R) Heel/Foot/Toe WDL  (L) Heel/Foot/Toe WDL          Devices In Places Blood Pressure Cuff and Pulse Ox      Interventions In Place Pillows    Possible Skin Injury Yes    Pictures Uploaded Into Epic Yes  Wound Consult Placed N/A  RN Wound Prevention Protocol Ordered No      Patient  daughter calling ( identified name and  )  needs to make pre-op appointment     Prefers Citizen of [place] speaking , jo Stanley dates and times for Colin [place] will check times with her sister and call us back to schedule appointment        Office phone number provided with office telephone hours.

## 2024-02-27 NOTE — TELEPHONE ENCOUNTER
This shiuld be ok -- bring any papers that were given to her by ortho for the clearance   Will fwd to NP TERESSA  as well as helen

## 2024-02-27 NOTE — TELEPHONE ENCOUNTER
Patients daughter called to request a pre-op appointment. Surgery is scheduled for 3/8/2024.

## 2024-02-27 NOTE — TELEPHONE ENCOUNTER
Dr. Mccord: is it ok for Elena ORTIZ to do preop clearance? Or does patient need to see an MD?    Daughter, Ashley called back.   Patient is scheduled for knee replacement 3/8/24. Patient needs preop clearance.     Patient was schedule with Elena ORTIZ 2/29/24. 2pm  Patient also has appt with ORTHO 2/29/24 at 3pm

## 2024-02-27 NOTE — TELEPHONE ENCOUNTER
Please review; protocol failed/no protocol.     Requested Prescriptions   Pending Prescriptions Disp Refills    XARELTO 20 MG Oral Tab [Pharmacy Med Name: XARELTO 20MG TABLETS] 90 tablet 1     Sig: TAKE 1 TABLET(20 MG) BY MOUTH DAILY WITH FOOD       There is no refill protocol information for this order           Recent Outpatient Visits              7 months [facility]    Office Visit    9 months ago Multiple joint pain    [facility]    Office Visit    1 year ago Frequency of urination    [facility] [place] Saray Caceres PA-C    Office Visit    1 year ago Essential hypertension    [facility] Rafaela Christiansen MD    Office Visit    2 years ago Paroxysmal atrial fibrillation (HCC)    [facility] [place] John Rashid MD    Office Visit             Future Appointments         Provider Department Appt Notes    In 2 days [facility]; knee replacement

## 2024-02-28 NOTE — TELEPHONE ENCOUNTER
Spoke with pt's daughter per SAMANTHA, TONNY verified.  She was informed of below recommendation, she stated understanding.  Pt appt tomorrow ()  at 2 pm.         Future Appointments   Date Time Provider Department Center   2024  2:00 PM Elena Naylor APRN [facility]

## 2024-02-29 ENCOUNTER — MED REC SCAN ONLY (OUTPATIENT)
Dept: INTERNAL MEDICINE CLINIC | Facility: CLINIC | Age: 69
End: 2024-02-29

## 2024-02-29 ENCOUNTER — TELEPHONE (OUTPATIENT)
Dept: INTERNAL MEDICINE CLINIC | Facility: CLINIC | Age: 69
End: 2024-02-29

## 2024-02-29 ENCOUNTER — LAB ENCOUNTER (OUTPATIENT)
Dept: LAB | Facility: HOSPITAL | Age: 69
End: 2024-02-29
Payer: MEDICARE

## 2024-02-29 ENCOUNTER — HOSPITAL ENCOUNTER (OUTPATIENT)
Dept: GENERAL RADIOLOGY | Facility: HOSPITAL | Age: 69
Discharge: HOME | End: 2024-02-29
Payer: MEDICARE

## 2024-02-29 ENCOUNTER — OFFICE VISIT (OUTPATIENT)
Dept: INTERNAL MEDICINE CLINIC | Facility: CLINIC | Age: 69
End: 2024-02-29

## 2024-02-29 VITALS
HEART RATE: 59 BPM | SYSTOLIC BLOOD PRESSURE: 128 MMHG | HEIGHT: 68 IN | OXYGEN SATURATION: 97 % | WEIGHT: 174 LBS | DIASTOLIC BLOOD PRESSURE: 80 MMHG | BODY MASS INDEX: 26.37 KG/M2

## 2024-02-29 DIAGNOSIS — Z01.818 PREOP EXAM FOR INTERNAL MEDICINE: Primary | ICD-10-CM

## 2024-02-29 DIAGNOSIS — Z01.818 PREOP EXAM FOR INTERNAL MEDICINE: ICD-10-CM

## 2024-02-29 LAB
ALBUMIN SERPL-MCNC: 4.1 G/DL (ref 3.2–4.8)
ALBUMIN/GLOB SERPL: 1.4 {RATIO} (ref 1–2)
ALP LIVER SERPL-CCNC: 91 U/L
ALT SERPL-CCNC: 50 U/L
ANION GAP SERPL CALC-SCNC: 6 MMOL/L (ref 0–18)
APTT PPP: 41.8 SECONDS (ref 23.3–35.6)
AST SERPL-CCNC: 47 U/L (ref ?–34)
BILIRUB SERPL-MCNC: 0.5 MG/DL (ref 0.2–1.1)
BILIRUB UR QL: NEGATIVE
BUN BLD-MCNC: 19 MG/DL (ref 9–23)
BUN/CREAT SERPL: 18.8 (ref 10–20)
CALCIUM BLD-MCNC: 10.2 MG/DL (ref 8.7–10.4)
CHLORIDE SERPL-SCNC: 107 MMOL/L (ref 98–112)
CLARITY UR: CLEAR
CO2 SERPL-SCNC: 27 MMOL/L (ref 21–32)
CREAT BLD-MCNC: 1.01 MG/DL
DEPRECATED RDW RBC AUTO: 44 FL (ref 35.1–46.3)
EGFRCR SERPLBLD CKD-EPI 2021: 60 ML/MIN/1.73M2 (ref 60–?)
ERYTHROCYTE [DISTWIDTH] IN BLOOD BY AUTOMATED COUNT: 13 % (ref 11–15)
FASTING STATUS PATIENT QL REPORTED: NO
GLOBULIN PLAS-MCNC: 3 G/DL (ref 2.8–4.4)
GLUCOSE BLD-MCNC: 92 MG/DL (ref 70–99)
GLUCOSE UR-MCNC: NORMAL MG/DL
HCT VFR BLD AUTO: 40.7 %
HGB BLD-MCNC: 13.6 G/DL
HGB UR QL STRIP.AUTO: NEGATIVE
INR BLD: 2.77 (ref 0.8–1.2)
KETONES UR-MCNC: NEGATIVE MG/DL
LEUKOCYTE ESTERASE UR QL STRIP.AUTO: NEGATIVE
MCH RBC QN AUTO: 30.8 PG (ref 26–34)
MCHC RBC AUTO-ENTMCNC: 33.4 G/DL (ref 31–37)
MCV RBC AUTO: 92.1 FL
NITRITE UR QL STRIP.AUTO: NEGATIVE
OSMOLALITY SERPL CALC.SUM OF ELEC: 292 MOSM/KG (ref 275–295)
PH UR: 6 [PH] (ref 5–8)
PLATELET # BLD AUTO: 209 10(3)UL (ref 150–450)
POTASSIUM SERPL-SCNC: 4.7 MMOL/L (ref 3.5–5.1)
PROT SERPL-MCNC: 7.1 G/DL (ref 5.7–8.2)
PROT UR-MCNC: NEGATIVE MG/DL
PROTHROMBIN TIME: 31 SECONDS (ref 11.6–14.8)
RBC # BLD AUTO: 4.42 X10(6)UL
SODIUM SERPL-SCNC: 140 MMOL/L (ref 136–145)
SP GR UR STRIP: 1.02 (ref 1–1.03)
UROBILINOGEN UR STRIP-ACNC: NORMAL
WBC # BLD AUTO: 7.2 X10(3) UL (ref 4–11)

## 2024-02-29 PROCEDURE — 71046 X-RAY EXAM CHEST 2 VIEWS: CPT

## 2024-02-29 PROCEDURE — 85610 PROTHROMBIN TIME: CPT

## 2024-02-29 PROCEDURE — 85730 THROMBOPLASTIN TIME PARTIAL: CPT

## 2024-02-29 PROCEDURE — 80053 COMPREHEN METABOLIC PANEL: CPT

## 2024-02-29 PROCEDURE — 85027 COMPLETE CBC AUTOMATED: CPT

## 2024-02-29 PROCEDURE — 81003 URINALYSIS AUTO W/O SCOPE: CPT

## 2024-02-29 PROCEDURE — 36415 COLL VENOUS BLD VENIPUNCTURE: CPT

## 2024-02-29 PROCEDURE — 87081 CULTURE SCREEN ONLY: CPT

## 2024-02-29 PROCEDURE — 99213 OFFICE O/P EST LOW 20 MIN: CPT

## 2024-02-29 RX ORDER — LOSARTAN POTASSIUM 25 MG/1
25 TABLET ORAL DAILY
COMMUNITY
Start: 2023-08-15

## 2024-02-29 RX ORDER — DULOXETIN HYDROCHLORIDE 30 MG/1
60 CAPSULE, DELAYED RELEASE ORAL DAILY
COMMUNITY
Start: 2024-02-28

## 2024-02-29 RX ORDER — TRAMADOL HYDROCHLORIDE 50 MG/1
50 TABLET ORAL EVERY 6 HOURS PRN
COMMUNITY

## 2024-02-29 NOTE — PROGRESS NOTES
Brittnee Álvarez is a 68 year old female who presents for a pre-operative physical exam.     HPI related to surgery:   Procedure: right total knee arthroscopy  Surgeon: Dr. Wil Parks  Date: 3/8/2024  Location: [facility]  Indication: osteoarthritis right knee    Active Medical Problems:  Paroxysmal Afib  HTN  HLD  Varicose veins   Hypothyroidism   Anxiety   Primary osteoarthritis of both knees     Saw Dr. Rashid recently for a visit and went for an EKG on  which showed normal sinus rhythm    She has had previous anesthesia:  Yes.  Previous complications:  No    Social History     Socioeconomic History    Marital status:    Tobacco Use    Smoking status: Never    Smokeless tobacco: Never   Vaping Use    Vaping Use: Never used   Substance and Sexual Activity    Alcohol use: Not Currently    Drug use: Never      Past Medical History:   Diagnosis Date    Anesthesia complication     Anxiety     Arthritis     Atrial fibrillation (HCC)     Back problem     Deep vein thrombosis (HCC) 2018    right leg    Disorder of thyroid     Essential hypertension     Hyperlipidemia     Hypothyroidism     Osteoarthritis     PONV (postoperative nausea and vomiting)     Stroke (HCC)     TIA    Visual impairment     glasses     Past Surgical History:   Procedure Laterality Date    APPENDECTOMY             section x 3     HEMORRHOIDECTOMY      OTHER SURGICAL HISTORY  1986    Fibroid    OTHER SURGICAL HISTORY      breast lump     Allergies:   Allergies   Allergen Reactions    Radiology Contrast Iodinated Dyes UNKNOWN     Unable to recall     Current Outpatient Medications   Medication Sig Dispense Refill    traMADol 50 MG Oral Tab Take 1 tablet (50 mg total) by mouth every 6 (six) hours as needed for Pain.      DULoxetine 30 MG Oral Cap DR Particles       losartan 25 MG Oral Tab losartan 25 mg tablet, [RxNorm: 672084]      rivaroxaban (XARELTO) 20 MG Oral Tab Take 1 tablet (20 mg total)  by mouth daily with food. 90 tablet 1    metoprolol succinate ER 25 MG Oral Tablet 24 Hr Take 1 tablet (25 mg total) by mouth daily. 30 tablet 2    amiodarone 200 MG Oral Tab Take 0.5 tablets (100 mg total) by mouth daily. 90 tablet 3    pantoprazole 40 MG Oral Tab EC Take 1 tablet (40 mg total) by mouth before breakfast. 90 tablet 3    atorvastatin 10 MG Oral Tab Take 1 tablet (10 mg total) by mouth nightly. 90 tablet 3    acetaminophen 500 MG Oral Tab Take 1 tablet (500 mg total) by mouth every 6 (six) hours as needed for Fever or Pain (equal to or greater than 100.4). (Patient not taking: Reported on 2/29/2024) 1 tablet 0        REVIEW OF SYSTEMS:   Review of Systems   Constitutional: Negative.    Respiratory: Negative.     Cardiovascular: Negative.    Gastrointestinal: Negative.    Skin: Negative.    Neurological: Negative.         PHYSICAL EXAM:   /80   Pulse 59   Ht 5' 8\" (1.727 m)   Wt 174 lb (78.9 kg)   SpO2 97%   BMI 26.46 kg/m²    Physical Exam  Vitals reviewed.   Constitutional:       General: She is not in acute distress.     Appearance: Normal appearance. She is well-developed.   HENT:      Right Ear: Tympanic membrane normal.      Left Ear: Tympanic membrane normal.      Mouth/Throat:      Mouth: Mucous membranes are moist.      Pharynx: Oropharynx is clear.   Cardiovascular:      Rate and Rhythm: Normal rate and regular rhythm.      Heart sounds: Normal heart sounds.   Pulmonary:      Effort: Pulmonary effort is normal.      Breath sounds: Normal breath sounds.   Abdominal:      General: Bowel sounds are normal.      Palpations: Abdomen is soft.   Skin:     General: Skin is warm and dry.   Neurological:      Mental Status: She is alert and oriented to person, place, and time.        LABORATORY DATA:     Previous reaction to anesthesia? no  Known clotting disorder or on blood thinners? On xarelto     Cardiac:  - History of ischemic coronary disease: no  - History of heart failure: no  -  Heart attack in past 90 days: no  - Chest pain in last 90 days: no  - History of Arrhythmia: yes   - History of stroke or TIA: history of mild TIA about 10 years ago    - Diabetes/A1C: no      - Most recent echo/Stress test:    Result Notes  Details    Reading Physician Reading Date Result Priority   John Rashid MD  [phone] 7/10/2023      Narrative  Transthoracic Echocardiogram    Name:Brittnee Álvarez    Date: 07/10/2023 :  1955 Ht:  (68in)  BP: 97 / 38  MRN:  [id number]    Age:  68years    Wt:  (180lb) HR:  [facility]  [facility]       Gndr: F          BSA: 1.95m^2  Sonographer: Edwina Jones    Ordering:    Norman Mitchell  Consulting:  John Rashid    -------------------------------------------------------  History/Indications:   Chest Pain and Afib.    -------------------------------------------------------    ECG rhythm:   Normal sinus    -------------------------------------------------------    Conclusions:  Left ventricle: The cavity size was normal. Wall thickness was normal.  Systolic function was normal. The estimated ejection fraction was 55-60%, by  biplane method of disks. Left ventricular diastolic function parameters were  normal.     Reading Physician Reading Date Result Priority   Karlo Rodriguez MD  [phone] 2020      Narrative  PROCEDURE: CARD NUC STRESS TEST LEXISCAN     COMPARISON: None.     INDICATIONS: Paroxysmal atrial fibrillation, preoperative cardiac evaluation for left knee replacement,     FINDINGS:  RAW PATIENT DATA: A review of the raw patient data showed it was adequate for interpretation.     WALL MOTION ANALYSIS: The gated SPECT images showed normal wall motion.  An end diastolic volume of 86-mL was seen.     LV EJECTION FRACTION: 74 %     PERFUSION IMAGING: The myocardial perfusion images post pharmacologic stress demonstrate mild diffusely decreased tracer in the inferior wall that normalizes with ventricular systole and most likely represents a diaphragmatic  attenuation artifact..  The  resting images demonstrates similar diaphragmatic attenuation artifact.       Impression  CONCLUSION: Normal regadenoson (Lexiscan) myocardial perfusion study with normal wall motion and left ventricular ejection fraction.          Pulmonary:   - Tobacco use: no  - Apnea/CPAP: no  - Asthma/COPD: no  - Difficulty laying flat for extended period of time: no  - Dyspnea/exertional: no         Functional Capacity:   Perform ADLs- eating, dress, toilet? yes  Walk up flight of steps, hill, walk ground level 3-4mph? yes  Perform heavy housework- scrubbing floors, move heavy furniture, climb 2 flights of stairs? Yes but limited due to knee pain  Participate in strenuous sports- swimming, singles tennis, football, basketball, ski? No     The 10-year ASCVD risk score (Loren CORRIGAN, et al., 2019) is: 10%    Values used to calculate the score:      Age: 68 years      Sex: Female      Is Non- : No      Diabetic: No      Tobacco smoker: No      Systolic Blood Pressure: 128 mmHg      Is BP treated: Yes      HDL Cholesterol: 82 mg/dL      Total Cholesterol: 179 mg/dL    RCRI Class II Risk (1 point) with 6.0 % 30-day risk of death, MI, or cardiac arrest    ASSESSMENT AND PLAN:     Given the information available in the above notes the patient may proceed with surgical intervention as long as the benefit outweighs the risk at the time of the procedure. This consult was sent back the referring physician, Dr. Parks.    Assessment:  Encounter Diagnosis   Name Primary?    Preop exam for internal medicine Yes       Plan   Orders Placed This Encounter   Procedures    CBC, Platelet, No Differential [E]    Comp Metabolic Panel (14) [E]    Prothrombin Time (PT) [E]    PTT, Activated [E]    Urinalysis, Routine [E]    MSSA and MRSA Culture Screen       MIHAI Serrato

## 2024-03-01 NOTE — TELEPHONE ENCOUNTER
1st attempt/MyChart message was sent to the patient to schedule an appointment per the below request.

## 2024-03-04 ENCOUNTER — TELEPHONE (OUTPATIENT)
Dept: ORTHOPEDICS CLINIC | Facility: CLINIC | Age: 69
End: 2024-03-04

## 2024-03-04 DIAGNOSIS — Z01.818 PREOP TESTING: Primary | ICD-10-CM

## 2024-03-05 ENCOUNTER — LAB ENCOUNTER (OUTPATIENT)
Dept: LAB | Facility: HOSPITAL | Age: 69
End: 2024-03-05
Attending: PHYSICIAN ASSISTANT
Payer: MEDICARE

## 2024-03-05 DIAGNOSIS — Z01.818 PREOP TESTING: ICD-10-CM

## 2024-03-05 LAB
INR BLD: 1 (ref 0.8–1.2)
PROTHROMBIN TIME: 13.8 SECONDS (ref 11.6–14.8)

## 2024-03-05 PROCEDURE — 85610 PROTHROMBIN TIME: CPT

## 2024-03-05 PROCEDURE — 36415 COLL VENOUS BLD VENIPUNCTURE: CPT

## 2024-03-07 ENCOUNTER — ANESTHESIA EVENT (OUTPATIENT)
Dept: SURGERY | Facility: HOSPITAL | Age: 69
End: 2024-03-07
Payer: MEDICARE

## 2024-03-07 NOTE — H&P
[facility]  part of [facility]    History & Physical    Brittnee Álvarez Patient Status:  Surgery Admit - Inpt    1955 MRN [id number]   Location [facility] OPERATING ROOM Attending Wil Parks, *   Hosp Day # 0 PCP Rafaela Mccord MD     Date:  3/7/2024  Date of Admission:  (Not on file)    History provided by:patient  Chief Complaint:   Right knee pain    HPI:   Brittnee Álvarez is a(n) 68 year old female. She presents with pain in her right knee.  We did her left knee replacement in 2019.  She is very happy with the results.  She has been under the care of Dr. Alka Parks for her right knee osteoarthritis.  She has had multiple injections of cortisone hyaluronic, the last one in November.  She is also taking Duloxetine and Cymbalta.  She has had physical therapy as well.  She has pain walking even short distances, and difficulty getting in and out of a chair.    History     Past Medical History:   Diagnosis Date    Anesthesia complication     Anxiety     Arthritis     Atrial fibrillation (HCC)     Back problem     Colitis     Deep vein thrombosis (HCC) 2018    right leg    Disorder of thyroid     Essential hypertension     Fibromyalgia     Hyperlipidemia     Hypothyroidism     Migraines     Osteoarthritis     PONV (postoperative nausea and vomiting)     Stroke (HCC)     TIA    Visual impairment     glasses     Past Surgical History:   Procedure Laterality Date    APPENDECTOMY             section x 3     HEMORRHOIDECTOMY      OTHER SURGICAL HISTORY      Fibroid    OTHER SURGICAL HISTORY      breast lump    TOTAL KNEE REPLACEMENT Left          Family History   Problem Relation Age of Onset    Heart Disorder Father     Cancer Mother         ?liver      Social History:  Social History     Socioeconomic History    Marital status:    Tobacco Use    Smoking status: Never    Smokeless tobacco: Never   Vaping Use    Vaping Use: Never used    Substance and Sexual Activity    Alcohol use: Not Currently    Drug use: Never     Allergies/Medications:   Allergies:   Allergies   Allergen Reactions    Radiology Contrast Iodinated Dyes UNKNOWN     Unable to recall     No medications prior to admission.       Review of Systems:   Pertinent items are noted in HPI.    Physical Exam:   Vital Signs:  Height 5' 8\" (1.727 m), weight 174 lb (78.9 kg).     General appearance: alert, appears stated age and cooperative  Extremities: She walks with an antalgic gait.  There is crepitus with motion.  She has a 10 degree flexion contracture with further flexion to 85 degrees.  Active plantar flexion and dorsiflexion of the foot with well perfused foot.  Pulses: 2+ and symmetric      Results:     Lab Results   Component Value Date    WBC 7.2 02/29/2024    HGB 13.6 02/29/2024    HCT 40.7 02/29/2024    .0 02/29/2024    CREATSERUM 1.01 02/29/2024    BUN 19 02/29/2024     02/29/2024    K 4.7 02/29/2024     02/29/2024    CO2 27.0 02/29/2024    GLU 92 02/29/2024    CA 10.2 02/29/2024    ALB 4.1 02/29/2024    ALKPHO 91 02/29/2024    BILT 0.5 02/29/2024    TP 7.1 02/29/2024    AST 47 (H) 02/29/2024    ALT 50 (H) 02/29/2024    PTT 41.8 (H) 02/29/2024    INR 1.00 03/05/2024    TSH 1.890 07/09/2023    ESRML 16 05/23/2023    MG 2.1 07/10/2023    PHOS 2.7 07/10/2023     Standing AP of both knees with lateral and skyline views of the right knee demonstrates end stage osteoarthritis.  There is complete loss of the patellofemoral space with marked erosion of the lateral facet of the patella.  There are degenerative changes involving the medial and lateral compartments as well with joint space narrowing and extensive osteophyte formation.  There is a well aligned well fixed left total knee arthroplasty noted incidentally.      No results found.        Assessment/Plan:     She has advanced osteoarthritis of the right knee that has not responded to appropriate conservative  treatment.  She wants to schedule right knee replacement for which she is an appropriate candidate.  We discussed the potential risks and benefitsof surgery at length.  She had a DVT following her previous surgery despite restarting her Xarelto postoperatively.  She does take Xarelto routinely.  We talked about the pros and cons of an IV f IVC filter preoperatively.  we do not feel this is indicated, but we would emphasize early mobilization as well as immediately restarting her Xarelto.  She has received her clearances.      ALEKSANDAR POWELL PA-C  3/7/2024

## 2024-03-08 ENCOUNTER — ANESTHESIA (OUTPATIENT)
Dept: SURGERY | Facility: HOSPITAL | Age: 69
End: 2024-03-08
Payer: MEDICARE

## 2024-03-08 ENCOUNTER — HOSPITAL ENCOUNTER (INPATIENT)
Facility: HOSPITAL | Age: 69
LOS: 2 days | Discharge: HOME HEALTH | End: 2024-03-10
Attending: ORTHOPAEDIC SURGERY | Admitting: ORTHOPAEDIC SURGERY
Payer: MEDICARE

## 2024-03-08 ENCOUNTER — APPOINTMENT (OUTPATIENT)
Dept: GENERAL RADIOLOGY | Facility: HOSPITAL | Age: 69
End: 2024-03-08
Attending: PHYSICIAN ASSISTANT
Payer: MEDICARE

## 2024-03-08 DIAGNOSIS — G89.18 POSTOPERATIVE PAIN: Primary | ICD-10-CM

## 2024-03-08 PROBLEM — M17.11 PRIMARY OSTEOARTHRITIS OF RIGHT KNEE: Status: ACTIVE | Noted: 2024-03-08

## 2024-03-08 LAB
HCT VFR BLD AUTO: 34.1 %
HGB BLD-MCNC: 11.1 G/DL

## 2024-03-08 PROCEDURE — 0SRC0J9 REPLACEMENT OF RIGHT KNEE JOINT WITH SYNTHETIC SUBSTITUTE, CEMENTED, OPEN APPROACH: ICD-10-PCS | Performed by: ORTHOPAEDIC SURGERY

## 2024-03-08 PROCEDURE — 73560 X-RAY EXAM OF KNEE 1 OR 2: CPT | Performed by: PHYSICIAN ASSISTANT

## 2024-03-08 PROCEDURE — 99222 1ST HOSP IP/OBS MODERATE 55: CPT | Performed by: HOSPITALIST

## 2024-03-08 DEVICE — GMK SPHERE KNEE: Type: IMPLANTABLE DEVICE | Site: KNEE

## 2024-03-08 DEVICE — TIBIAL INSERT FIXED SPHERE FLEX   #4/10 MM R E-CROSS
Type: IMPLANTABLE DEVICE | Site: KNEE | Status: FUNCTIONAL
Brand: GMK SPHERE TOTAL KNEE SYSTEM

## 2024-03-08 DEVICE — FIXED TIBIAL TRAY CEMENTED RIGHT, SIZE 4
Type: IMPLANTABLE DEVICE | Site: KNEE | Status: FUNCTIONAL
Brand: GMK PRIMARY TOTAL KNEE SYSTEM

## 2024-03-08 DEVICE — IMPLANTABLE DEVICE
Type: IMPLANTABLE DEVICE | Site: KNEE | Status: FUNCTIONAL
Brand: REFOBACIN® BONE CEMENT R

## 2024-03-08 DEVICE — FEMUR SPHERE CEMENTED RIGHT, SIZE 5+
Type: IMPLANTABLE DEVICE | Site: KNEE | Status: FUNCTIONAL
Brand: GMK SPHERE TOTAL KNEE SYSTEM

## 2024-03-08 DEVICE — MY KNEE: Type: IMPLANTABLE DEVICE | Site: KNEE

## 2024-03-08 DEVICE — PATELLA RESURFACING # 3 E-CROSS
Type: IMPLANTABLE DEVICE | Site: KNEE | Status: FUNCTIONAL
Brand: GMK

## 2024-03-08 RX ORDER — LOSARTAN POTASSIUM 25 MG/1
25 TABLET ORAL DAILY
Status: DISCONTINUED | OUTPATIENT
Start: 2024-03-09 | End: 2024-03-10

## 2024-03-08 RX ORDER — HYDROCODONE BITARTRATE AND ACETAMINOPHEN 7.5; 325 MG/1; MG/1
1 TABLET ORAL EVERY 6 HOURS PRN
Status: DISCONTINUED | OUTPATIENT
Start: 2024-03-08 | End: 2024-03-09

## 2024-03-08 RX ORDER — SODIUM CHLORIDE 9 MG/ML
INJECTION, SOLUTION INTRAVENOUS CONTINUOUS
Status: DISCONTINUED | OUTPATIENT
Start: 2024-03-08 | End: 2024-03-10

## 2024-03-08 RX ORDER — MIDAZOLAM HYDROCHLORIDE 1 MG/ML
INJECTION INTRAMUSCULAR; INTRAVENOUS AS NEEDED
Status: DISCONTINUED | OUTPATIENT
Start: 2024-03-08 | End: 2024-03-08 | Stop reason: SURG

## 2024-03-08 RX ORDER — PANTOPRAZOLE SODIUM 40 MG/1
40 TABLET, DELAYED RELEASE ORAL
Status: DISCONTINUED | OUTPATIENT
Start: 2024-03-09 | End: 2024-03-10

## 2024-03-08 RX ORDER — ACETAMINOPHEN 325 MG/1
650 TABLET ORAL EVERY 6 HOURS PRN
Status: ACTIVE | OUTPATIENT
Start: 2024-03-08 | End: 2024-03-09

## 2024-03-08 RX ORDER — ONDANSETRON 2 MG/ML
4 INJECTION INTRAMUSCULAR; INTRAVENOUS ONCE AS NEEDED
Status: ACTIVE | OUTPATIENT
Start: 2024-03-08 | End: 2024-03-08

## 2024-03-08 RX ORDER — SENNOSIDES 8.6 MG
17.2 TABLET ORAL NIGHTLY
Status: DISCONTINUED | OUTPATIENT
Start: 2024-03-08 | End: 2024-03-10

## 2024-03-08 RX ORDER — SODIUM CHLORIDE, SODIUM LACTATE, POTASSIUM CHLORIDE, CALCIUM CHLORIDE 600; 310; 30; 20 MG/100ML; MG/100ML; MG/100ML; MG/100ML
INJECTION, SOLUTION INTRAVENOUS CONTINUOUS
Status: DISCONTINUED | OUTPATIENT
Start: 2024-03-08 | End: 2024-03-10

## 2024-03-08 RX ORDER — DOCUSATE SODIUM 100 MG/1
100 CAPSULE, LIQUID FILLED ORAL 2 TIMES DAILY
Status: DISCONTINUED | OUTPATIENT
Start: 2024-03-08 | End: 2024-03-10

## 2024-03-08 RX ORDER — CEFAZOLIN SODIUM/WATER 2 G/20 ML
2 SYRINGE (ML) INTRAVENOUS EVERY 8 HOURS
Status: COMPLETED | OUTPATIENT
Start: 2024-03-08 | End: 2024-03-09

## 2024-03-08 RX ORDER — METOPROLOL SUCCINATE 25 MG/1
25 TABLET, EXTENDED RELEASE ORAL DAILY
Status: DISCONTINUED | OUTPATIENT
Start: 2024-03-09 | End: 2024-03-10

## 2024-03-08 RX ORDER — PROCHLORPERAZINE EDISYLATE 5 MG/ML
5 INJECTION INTRAMUSCULAR; INTRAVENOUS ONCE AS NEEDED
Status: ACTIVE | OUTPATIENT
Start: 2024-03-08 | End: 2024-03-08

## 2024-03-08 RX ORDER — HYDROMORPHONE HYDROCHLORIDE 1 MG/ML
0.2 INJECTION, SOLUTION INTRAMUSCULAR; INTRAVENOUS; SUBCUTANEOUS EVERY 5 MIN PRN
Status: DISCONTINUED | OUTPATIENT
Start: 2024-03-08 | End: 2024-03-08 | Stop reason: HOSPADM

## 2024-03-08 RX ORDER — CEFAZOLIN SODIUM/WATER 2 G/20 ML
2 SYRINGE (ML) INTRAVENOUS ONCE
Status: COMPLETED | OUTPATIENT
Start: 2024-03-08 | End: 2024-03-08

## 2024-03-08 RX ORDER — MORPHINE SULFATE 1 MG/ML
INJECTION, SOLUTION EPIDURAL; INTRATHECAL; INTRAVENOUS
Status: COMPLETED | OUTPATIENT
Start: 2024-03-08 | End: 2024-03-08

## 2024-03-08 RX ORDER — HYDROMORPHONE HYDROCHLORIDE 1 MG/ML
0.2 INJECTION, SOLUTION INTRAMUSCULAR; INTRAVENOUS; SUBCUTANEOUS EVERY 2 HOUR PRN
Status: DISCONTINUED | OUTPATIENT
Start: 2024-03-08 | End: 2024-03-10

## 2024-03-08 RX ORDER — HYDROMORPHONE HYDROCHLORIDE 1 MG/ML
0.4 INJECTION, SOLUTION INTRAMUSCULAR; INTRAVENOUS; SUBCUTANEOUS EVERY 5 MIN PRN
Status: DISCONTINUED | OUTPATIENT
Start: 2024-03-08 | End: 2024-03-08 | Stop reason: HOSPADM

## 2024-03-08 RX ORDER — HYDROMORPHONE HYDROCHLORIDE 1 MG/ML
0.6 INJECTION, SOLUTION INTRAMUSCULAR; INTRAVENOUS; SUBCUTANEOUS EVERY 5 MIN PRN
Status: DISCONTINUED | OUTPATIENT
Start: 2024-03-08 | End: 2024-03-08 | Stop reason: HOSPADM

## 2024-03-08 RX ORDER — DIPHENHYDRAMINE HCL 25 MG
25 CAPSULE ORAL EVERY 4 HOURS PRN
Status: DISCONTINUED | OUTPATIENT
Start: 2024-03-08 | End: 2024-03-10

## 2024-03-08 RX ORDER — NALOXONE HYDROCHLORIDE 0.4 MG/ML
0.08 INJECTION, SOLUTION INTRAMUSCULAR; INTRAVENOUS; SUBCUTANEOUS AS NEEDED
Status: DISCONTINUED | OUTPATIENT
Start: 2024-03-08 | End: 2024-03-08 | Stop reason: HOSPADM

## 2024-03-08 RX ORDER — NALBUPHINE HYDROCHLORIDE 10 MG/ML
2.5 INJECTION, SOLUTION INTRAMUSCULAR; INTRAVENOUS; SUBCUTANEOUS EVERY 4 HOURS PRN
Status: ACTIVE | OUTPATIENT
Start: 2024-03-08 | End: 2024-03-09

## 2024-03-08 RX ORDER — ATORVASTATIN CALCIUM 10 MG/1
10 TABLET, FILM COATED ORAL NIGHTLY
Status: DISCONTINUED | OUTPATIENT
Start: 2024-03-08 | End: 2024-03-10

## 2024-03-08 RX ORDER — DIPHENHYDRAMINE HCL 25 MG
25 CAPSULE ORAL EVERY 4 HOURS PRN
Status: ACTIVE | OUTPATIENT
Start: 2024-03-08 | End: 2024-03-09

## 2024-03-08 RX ORDER — CELECOXIB 200 MG/1
400 CAPSULE ORAL ONCE
Status: COMPLETED | OUTPATIENT
Start: 2024-03-08 | End: 2024-03-08

## 2024-03-08 RX ORDER — DIPHENHYDRAMINE HYDROCHLORIDE 50 MG/ML
25 INJECTION INTRAMUSCULAR; INTRAVENOUS ONCE AS NEEDED
Status: ACTIVE | OUTPATIENT
Start: 2024-03-08 | End: 2024-03-08

## 2024-03-08 RX ORDER — TRANEXAMIC ACID 10 MG/ML
INJECTION, SOLUTION INTRAVENOUS AS NEEDED
Status: DISCONTINUED | OUTPATIENT
Start: 2024-03-08 | End: 2024-03-08 | Stop reason: SURG

## 2024-03-08 RX ORDER — NALOXONE HYDROCHLORIDE 0.4 MG/ML
0.08 INJECTION, SOLUTION INTRAMUSCULAR; INTRAVENOUS; SUBCUTANEOUS
Status: ACTIVE | OUTPATIENT
Start: 2024-03-08 | End: 2024-03-09

## 2024-03-08 RX ORDER — FAMOTIDINE 20 MG/1
20 TABLET, FILM COATED ORAL 2 TIMES DAILY
Status: DISCONTINUED | OUTPATIENT
Start: 2024-03-08 | End: 2024-03-08

## 2024-03-08 RX ORDER — MORPHINE SULFATE 10 MG/ML
6 INJECTION, SOLUTION INTRAMUSCULAR; INTRAVENOUS EVERY 10 MIN PRN
Status: DISCONTINUED | OUTPATIENT
Start: 2024-03-08 | End: 2024-03-08 | Stop reason: HOSPADM

## 2024-03-08 RX ORDER — ACETAMINOPHEN 500 MG
1000 TABLET ORAL ONCE
Status: COMPLETED | OUTPATIENT
Start: 2024-03-08 | End: 2024-03-08

## 2024-03-08 RX ORDER — MELATONIN
325
Status: DISCONTINUED | OUTPATIENT
Start: 2024-03-09 | End: 2024-03-10

## 2024-03-08 RX ORDER — DIPHENHYDRAMINE HYDROCHLORIDE 50 MG/ML
12.5 INJECTION INTRAMUSCULAR; INTRAVENOUS EVERY 4 HOURS PRN
Status: DISCONTINUED | OUTPATIENT
Start: 2024-03-08 | End: 2024-03-10

## 2024-03-08 RX ORDER — HYDROCODONE BITARTRATE AND ACETAMINOPHEN 7.5; 325 MG/1; MG/1
2 TABLET ORAL EVERY 6 HOURS PRN
Status: DISCONTINUED | OUTPATIENT
Start: 2024-03-08 | End: 2024-03-09

## 2024-03-08 RX ORDER — BISACODYL 10 MG
10 SUPPOSITORY, RECTAL RECTAL
Status: DISCONTINUED | OUTPATIENT
Start: 2024-03-08 | End: 2024-03-10

## 2024-03-08 RX ORDER — ONDANSETRON 2 MG/ML
4 INJECTION INTRAMUSCULAR; INTRAVENOUS EVERY 6 HOURS PRN
Status: DISCONTINUED | OUTPATIENT
Start: 2024-03-08 | End: 2024-03-10

## 2024-03-08 RX ORDER — EPHEDRINE SULFATE 50 MG/ML
INJECTION INTRAVENOUS AS NEEDED
Status: DISCONTINUED | OUTPATIENT
Start: 2024-03-08 | End: 2024-03-08 | Stop reason: SURG

## 2024-03-08 RX ORDER — MORPHINE SULFATE 4 MG/ML
4 INJECTION, SOLUTION INTRAMUSCULAR; INTRAVENOUS EVERY 10 MIN PRN
Status: DISCONTINUED | OUTPATIENT
Start: 2024-03-08 | End: 2024-03-08 | Stop reason: HOSPADM

## 2024-03-08 RX ORDER — HYDROMORPHONE HYDROCHLORIDE 1 MG/ML
0.6 INJECTION, SOLUTION INTRAMUSCULAR; INTRAVENOUS; SUBCUTANEOUS
Status: DISPENSED | OUTPATIENT
Start: 2024-03-08 | End: 2024-03-09

## 2024-03-08 RX ORDER — ENEMA 19; 7 G/133ML; G/133ML
1 ENEMA RECTAL ONCE AS NEEDED
Status: DISCONTINUED | OUTPATIENT
Start: 2024-03-08 | End: 2024-03-10

## 2024-03-08 RX ORDER — FAMOTIDINE 10 MG/ML
20 INJECTION, SOLUTION INTRAVENOUS 2 TIMES DAILY
Status: DISCONTINUED | OUTPATIENT
Start: 2024-03-08 | End: 2024-03-08

## 2024-03-08 RX ORDER — HYDROCODONE BITARTRATE AND ACETAMINOPHEN 7.5; 325 MG/1; MG/1
1 TABLET ORAL EVERY 6 HOURS PRN
Qty: 40 TABLET | Refills: 0 | Status: SHIPPED | OUTPATIENT
Start: 2024-03-08 | End: 2024-03-10

## 2024-03-08 RX ORDER — HALOPERIDOL 5 MG/ML
0.5 INJECTION INTRAMUSCULAR ONCE AS NEEDED
Status: ACTIVE | OUTPATIENT
Start: 2024-03-08 | End: 2024-03-08

## 2024-03-08 RX ORDER — DIPHENHYDRAMINE HYDROCHLORIDE 50 MG/ML
12.5 INJECTION INTRAMUSCULAR; INTRAVENOUS EVERY 4 HOURS PRN
Status: ACTIVE | OUTPATIENT
Start: 2024-03-08 | End: 2024-03-09

## 2024-03-08 RX ORDER — PSEUDOEPHEDRINE HCL 30 MG
100 TABLET ORAL 2 TIMES DAILY
Qty: 20 CAPSULE | Refills: 0 | Status: SHIPPED | OUTPATIENT
Start: 2024-03-08

## 2024-03-08 RX ORDER — BUPIVACAINE HYDROCHLORIDE AND EPINEPHRINE 5; 5 MG/ML; UG/ML
INJECTION, SOLUTION PERINEURAL AS NEEDED
Status: DISCONTINUED | OUTPATIENT
Start: 2024-03-08 | End: 2024-03-08 | Stop reason: HOSPADM

## 2024-03-08 RX ORDER — AMIODARONE HYDROCHLORIDE 100 MG/1
100 TABLET ORAL DAILY
Status: DISCONTINUED | OUTPATIENT
Start: 2024-03-09 | End: 2024-03-10

## 2024-03-08 RX ORDER — BUPIVACAINE HYDROCHLORIDE 7.5 MG/ML
INJECTION, SOLUTION INTRASPINAL
Status: COMPLETED | OUTPATIENT
Start: 2024-03-08 | End: 2024-03-08

## 2024-03-08 RX ORDER — MELATONIN
325
Qty: 20 TABLET | Refills: 0 | Status: SHIPPED | OUTPATIENT
Start: 2024-03-09

## 2024-03-08 RX ORDER — DULOXETIN HYDROCHLORIDE 60 MG/1
60 CAPSULE, DELAYED RELEASE ORAL DAILY
Status: DISCONTINUED | OUTPATIENT
Start: 2024-03-09 | End: 2024-03-10

## 2024-03-08 RX ORDER — MORPHINE SULFATE 4 MG/ML
2 INJECTION, SOLUTION INTRAMUSCULAR; INTRAVENOUS EVERY 10 MIN PRN
Status: DISCONTINUED | OUTPATIENT
Start: 2024-03-08 | End: 2024-03-08 | Stop reason: HOSPADM

## 2024-03-08 RX ORDER — POLYETHYLENE GLYCOL 3350 17 G/17G
17 POWDER, FOR SOLUTION ORAL DAILY PRN
Status: DISCONTINUED | OUTPATIENT
Start: 2024-03-08 | End: 2024-03-10

## 2024-03-08 RX ORDER — HYDROMORPHONE HYDROCHLORIDE 1 MG/ML
0.4 INJECTION, SOLUTION INTRAMUSCULAR; INTRAVENOUS; SUBCUTANEOUS
Status: ACTIVE | OUTPATIENT
Start: 2024-03-08 | End: 2024-03-09

## 2024-03-08 RX ORDER — HYDROMORPHONE HYDROCHLORIDE 1 MG/ML
0.4 INJECTION, SOLUTION INTRAMUSCULAR; INTRAVENOUS; SUBCUTANEOUS EVERY 2 HOUR PRN
Status: DISCONTINUED | OUTPATIENT
Start: 2024-03-08 | End: 2024-03-10

## 2024-03-08 RX ORDER — LIDOCAINE HYDROCHLORIDE 10 MG/ML
INJECTION, SOLUTION INFILTRATION; PERINEURAL
Status: COMPLETED | OUTPATIENT
Start: 2024-03-08 | End: 2024-03-08

## 2024-03-08 RX ORDER — ONDANSETRON 2 MG/ML
INJECTION INTRAMUSCULAR; INTRAVENOUS AS NEEDED
Status: DISCONTINUED | OUTPATIENT
Start: 2024-03-08 | End: 2024-03-08 | Stop reason: SURG

## 2024-03-08 RX ADMIN — MORPHINE SULFATE 0.2 MG: 1 INJECTION, SOLUTION EPIDURAL; INTRATHECAL; INTRAVENOUS at 07:47:00

## 2024-03-08 RX ADMIN — SODIUM CHLORIDE, SODIUM LACTATE, POTASSIUM CHLORIDE, CALCIUM CHLORIDE: 600; 310; 30; 20 INJECTION, SOLUTION INTRAVENOUS at 07:33:00

## 2024-03-08 RX ADMIN — BUPIVACAINE HYDROCHLORIDE 1.5 ML: 7.5 INJECTION, SOLUTION INTRASPINAL at 07:47:00

## 2024-03-08 RX ADMIN — EPHEDRINE SULFATE 5 MG: 50 INJECTION INTRAVENOUS at 08:14:00

## 2024-03-08 RX ADMIN — CEFAZOLIN SODIUM/WATER 2 G: 2 G/20 ML SYRINGE (ML) INTRAVENOUS at 08:05:00

## 2024-03-08 RX ADMIN — SODIUM CHLORIDE, SODIUM LACTATE, POTASSIUM CHLORIDE, CALCIUM CHLORIDE: 600; 310; 30; 20 INJECTION, SOLUTION INTRAVENOUS at 08:26:00

## 2024-03-08 RX ADMIN — TRANEXAMIC ACID 1000 MG: 10 INJECTION, SOLUTION INTRAVENOUS at 08:20:00

## 2024-03-08 RX ADMIN — MIDAZOLAM HYDROCHLORIDE 1 MG: 1 INJECTION INTRAMUSCULAR; INTRAVENOUS at 07:49:00

## 2024-03-08 RX ADMIN — SODIUM CHLORIDE, SODIUM LACTATE, POTASSIUM CHLORIDE, CALCIUM CHLORIDE: 600; 310; 30; 20 INJECTION, SOLUTION INTRAVENOUS at 09:30:00

## 2024-03-08 RX ADMIN — MIDAZOLAM HYDROCHLORIDE 1 MG: 1 INJECTION INTRAMUSCULAR; INTRAVENOUS at 07:38:00

## 2024-03-08 RX ADMIN — ONDANSETRON 4 MG: 2 INJECTION INTRAMUSCULAR; INTRAVENOUS at 07:58:00

## 2024-03-08 RX ADMIN — LIDOCAINE HYDROCHLORIDE 5 ML: 10 INJECTION, SOLUTION INFILTRATION; PERINEURAL at 07:38:00

## 2024-03-08 NOTE — ANESTHESIA PREPROCEDURE EVALUATION
Anesthesia PreOp Note    HPI:     Brittnee Álvarez is a 68 year old female who presents for preoperative consultation requested by: Wil Parks MD    Date of Surgery: 3/8/2024    Procedure(s):  Right total knee arthroplasty  Indication: Osteoarthritis right knee    Relevant Problems   No relevant active problems       NPO:                         History Review:  Patient Active Problem List    Diagnosis Date Noted    Chest pain of uncertain etiology 2023    Sedative, hypnotic or anxiolytic dependence, uncomplicated (HCC) 2022    Localized edema 2022    Vitamin D deficiency 2022    Varicose veins of both lower extremities with pain 2021    Osteoarthritis of carpometacarpal (CMC) joints of both thumbs 2021    Acute deep vein thrombosis (DVT) of left peroneal vein (HCC)     Primary osteoarthritis of left knee 2020    Dyslipidemia 2020    Hypothyroidism 2020    Paroxysmal atrial fibrillation (HCC) 2020    Atrial fibrillation with rapid ventricular response (HCC) 06/10/2020    Essential hypertension 06/10/2020    Primary osteoarthritis of both knees 06/10/2020    Anxiety 06/10/2020       Past Medical History:   Diagnosis Date    Anesthesia complication     Anxiety     Arthritis     Atrial fibrillation (HCC)     Back problem     Colitis     Deep vein thrombosis (HCC) 2018    right leg    Disorder of thyroid     Essential hypertension     Fibromyalgia     Hyperlipidemia     Hypothyroidism     Migraines     Osteoarthritis     PONV (postoperative nausea and vomiting)     Stroke (HCC)     TIA    Visual impairment     glasses       Past Surgical History:   Procedure Laterality Date    APPENDECTOMY             section x 3     HEMORRHOIDECTOMY      OTHER SURGICAL HISTORY      Fibroid    OTHER SURGICAL HISTORY      breast lump    TOTAL KNEE REPLACEMENT Left            Medications Prior to Admission   Medication Sig Dispense Refill  Last Dose    rivaroxaban (XARELTO) 20 MG Oral Tab Take 1 tablet (20 mg total) by mouth daily with food. 90 tablet 1     traMADol 50 MG Oral Tab Take 1 tablet (50 mg total) by mouth every 6 (six) hours as needed for Pain.       DULoxetine 30 MG Oral Cap DR Particles 2 capsules (60 mg total) daily.   3/8/2024 at 0600    losartan 25 MG Oral Tab Take 1 tablet (25 mg total) by mouth daily.       metoprolol succinate ER 25 MG Oral Tablet 24 Hr Take 1 tablet (25 mg total) by mouth daily. 30 tablet 2 3/8/2024 at 0600    acetaminophen 500 MG Oral Tab Take 1 tablet (500 mg total) by mouth every 6 (six) hours as needed for Fever or Pain (equal to or greater than 100.4). 1 tablet 0     amiodarone 200 MG Oral Tab Take 0.5 tablets (100 mg total) by mouth daily. 90 tablet 3 3/8/2024 at 0600    pantoprazole 40 MG Oral Tab EC Take 1 tablet (40 mg total) by mouth before breakfast. 90 tablet 3 3/8/2024 at 0600    atorvastatin 10 MG Oral Tab Take 1 tablet (10 mg total) by mouth nightly. 90 tablet 3      Current Facility-Administered Medications Ordered in Epic   Medication Dose Route Frequency Provider Last Rate Last Admin    lactated ringers infusion   Intravenous Continuous Wil Parks MD        acetaminophen (Tylenol Extra Strength) tab 1,000 mg  1,000 mg Oral Once Wil Parks MD        metoprolol tartrate (Lopressor) tab 25 mg  25 mg Oral Once PRN Wil Parks MD        celecoxib (CeleBREX) cap 400 mg  400 mg Oral Once Wil Parks MD        ceFAZolin (Ancef) 2 g in 20mL IV syringe premix  2 g Intravenous Once Wil Parks MD        vancomycin (Vancocin) 1,000 mg in sodium chloride 0.9% 250 mL IVPB-ADDV  1,000 mg Intravenous Once Wil Parks MD         No current [facility]-ordered outpatient medications on file.       Allergies   Allergen Reactions    Radiology Contrast Iodinated Dyes UNKNOWN     Unable to recall       Family History   Problem Relation Age of Onset     Heart Disorder Father     Cancer Mother         ?liver      Social History     Socioeconomic History    Marital status:    Tobacco Use    Smoking status: Never    Smokeless tobacco: Never   Vaping Use    Vaping Use: Never used   Substance and Sexual Activity    Alcohol use: Not Currently    Drug use: Never       Available pre-op labs reviewed.  Lab Results   Component Value Date    WBC 7.2 02/29/2024    RBC 4.42 02/29/2024    HGB 13.6 02/29/2024    HCT 40.7 02/29/2024    MCV 92.1 02/29/2024    MCH 30.8 02/29/2024    MCHC 33.4 02/29/2024    RDW 13.0 02/29/2024    .0 02/29/2024     Lab Results   Component Value Date     02/29/2024    K 4.7 02/29/2024     02/29/2024    CO2 27.0 02/29/2024    BUN 19 02/29/2024    CREATSERUM 1.01 02/29/2024    GLU 92 02/29/2024    CA 10.2 02/29/2024     Lab Results   Component Value Date    INR 1.00 03/05/2024       Vital Signs:  Body mass index is 26.46 kg/m².   height is 1.727 m (5' 8\") and weight is 78.9 kg (174 lb).   Vitals:    03/04/24 1225   Weight: 78.9 kg (174 lb)   Height: 1.727 m (5' 8\")        Anesthesia Evaluation     Patient summary reviewed and Nursing notes reviewed    History of anesthetic complications (ponv)   Airway   Mallampati: II  TM distance: >3 FB  Neck ROM: full  Dental - Dentition appears grossly intact     Pulmonary - negative ROS and normal exam   Cardiovascular - normal exam  (+) hypertension, dysrhythmias (afib)    ROS comment: TTE 23  Conclusions:   Left ventricle: The cavity size was normal. Wall thickness was normal.   Systolic function was normal. The estimated ejection fraction was 55-60%, by   biplane method of disks. Left ventricular diastolic function parameters were   normal.     Impressions: No previous study was available for comparison.       Neuro/Psych    (+)  neuromuscular disease, CVA, anxiety/panic attacks,        GI/Hepatic/Renal - negative ROS     Endo/Other - negative ROS   Abdominal                  Anesthesia  Plan:   ASA:  3  Plan:   Spinal  Post-op Pain Management: Oral pain medication and IV analgesics  Plan Comments: I have informed Brittnee Álvarez of the risks of spinal anesthesia including, but not limited to: failure, headache, backache, difficulty breathing, infection, bleeding, nerve damage, paralysis, death. The patient desires the proposed anesthetic as planned.    Plt >100, last Xarelto 7 days ago - proceed with spinal  Informed Consent Plan and Risks Discussed With:  Patient and child/children  Discussed plan with:  CRNA      I have informed Brittnee Álvarez and/or legal guardian or family member of the nature of the anesthetic plan, benefits, risks including possible dental damage if relevant, major complications, and any alternative forms of anesthetic management.   All of the patient's questions were answered to the best of my ability. The patient desires the anesthetic management as planned.  Chato Jones MD  3/8/2024 6:44 AM  Present on Admission:  **None**

## 2024-03-08 NOTE — DISCHARGE INSTRUCTIONS
Keep incision dry for 2 weeks.  Change Mepilex dressing every 3-5 days.  Ambulate with walker and assist.    Sometimes managing your health at home requires assistance.  The [facility] team has recognized your preference to use [facility].  They can be reached by phone at [phone].  The fax number for your reference is [phone].  A representative from the home health agency will contact you or your family to schedule your first visit.

## 2024-03-08 NOTE — PROGRESS NOTES
[facility] note: Duplicate Proton Pump Inhibitor with Histamine 2 blocker.    Brittnee Álvarez is a 68 year old patient who has been prescribed both famotidine (Pepcid)  And pantoprazole (Protonix).  Pepcid was discontinued per P&T approved protocol for duplicate therapy in adult patients for medications not ordered by gastroenterology.    Thank you,  Carissa Leone, PharmD  3/8/2024

## 2024-03-08 NOTE — INTERVAL H&P NOTE
Pre-op Diagnosis: Osteoarthritis right knee    The above referenced H&P was reviewed by KARINA VELASCO MD on 3/8/2024, the patient was examined and no significant changes have occurred in the patient's condition since the H&P was performed.  I discussed with the patient and/or legal representative the potential benefits, risks and side effects of this procedure; the likelihood of the patient achieving goals; and potential problems that might occur during recuperation.  I discussed reasonable alternatives to the procedure, including risks, benefits and side effects related to the alternatives and risks related to not receiving this procedure.  We will proceed with procedure as planned.

## 2024-03-08 NOTE — OPERATIVE REPORT
[facility]    PATIENT'S NAME: HIGINIO FONSECA   ATTENDING PHYSICIAN: Wil Parks MD   OPERATING PHYSICIAN: Wil Parks MD   PATIENT ACCOUNT#:   [id number]    LOCATION:  [address]  MEDICAL RECORD #:   [id number]       YOB: 1955  ADMISSION DATE:       03/08/2024      OPERATION DATE:  03/08/2024    OPERATIVE REPORT    PREOPERATIVE DIAGNOSIS:  Osteoarthritis of right knee.  POSTOPERATIVE DIAGNOSIS:  Osteoarthritis of right knee.   PROCEDURE PERFORMED:  Right total knee arthroplasty with Medacta MRI-navigated patient-specific instruments.    ASSISTANT:  Enzo Zurita PA-C    COMPONENTS USED:  Medacta Sphere size 5+ femur, 4 tibia, 3 patella, 10 mm spacer.      ESTIMATED BLOOD LOSS:  50 mL.      COMPLICATIONS:  None.     INDICATIONS:  The patient is a 68-year-old female patient with advanced arthritis of the right knee.  It has not responded to appropriate conservative management.  After discussion of the options for treatment, the patient requested the above procedure.  Our discussion included alternative treatments, and also included, but was not limited to, the potential risk of anesthetic complications, infection, DVT or PE, bleeding complications, periprosthetic fracture or extensor mechanism failure, potential need for revision surgery, nerve or vascular injury, unanticipated perioperative orthopedic or medical complications, etc.  Written consent was obtained.    OPERATIVE TECHNIQUE:  The patient was brought to the operating room and given appropriate anesthesia.  Prior to making an incision, a time out was performed by the surgical team.  A tourniquet was placed, and the knee was prepped and draped in the usual sterile fashion.  After exsanguination with an Esmarch bandage, the tourniquet was inflated with the knee fully flexed.  A longitudinal incision was made from just superior to the superomedial corner of the patella to the tibial tubercle.  A  midvastus approach to the femur was used.  The patellar cut was made first, and a patella protector was placed over the cut surface of the patella which was then subluxed into the lateral gutter.  The anterior and posterior cruciate ligaments were excised, and the femoral template was placed over the distal femur and seated well.  It was pinned anteriorly, and the distal reference holes were drilled.  The distal femoral cutting guide was then attached to the anterior pins, and the distal femoral cut was made.  The thickness of the distal femoral bone resection was measured and compared to the templated values.  The pins were then translated into the anterior reference holes, and the distal femoral cutting guide was applied.  The distal femoral guide was centered appropriately, pinned in place, and the distal femoral finishing cuts were made.  Next, the meniscal remnants were excised, and the tibial guide was seated.  The extramedullary tibial alignment guide was applied, and alignment was checked.  The tibial template was then pinned into place and exchanged for the tibial cutting guide.  Alignment of the cutting guide was rechecked with the extramedullary alignment guide.  Hohmann retractor was placed behind the tibia to protect the neurovascular structures, and Hohmann retractors were placed medially and laterally to protect the collateral ligaments and the patellar tendon.  The tibial cut was made and the tibial bone resection was taken and compared to the templated values.  Overall alignment was then checked with the extramedullary alignment guide. The spacer block was used, and the overall alignment, flexion and extension gaps were checked and were excellent.  The guide was used to size the patella.  The holes were drilled for the patella.  The trial components were then inserted.  The knee came to full extension and balanced well with varus and valgus stress with symmetrical flexion and extension gaps with the  spacer.  The tibial guide was then pinned into place and preparations were made for the stemmed portion of the tibial component.  The trochlear recess was cut for the femur.  The trial components were then removed, and the bone was prepared with pulsatile lavage.  All three components were cemented in place using contemporary technique.  Excess cement was removed, and trials again were performed.  Stability and alignment were the same as with the provisional components.  The actual polyethylene spacer was locked into place in the tibial tray.  The tourniquet was deflated and hemostasis was achieved.  The wound was closed in routine fashion.  Sponge and instrument counts were correct at the end of the procedure.  Estimated blood loss was 50 mL.  Routine specimens were sent to Pathology.  There were no complications.  The patient was stable under the care of the anesthesiologist at the completion of the procedure.     Dictated By Wil Parks MD  d: 03/08/2024 09:29:45  t: 03/08/2024 14:18:28  [facility] [id number]  [facility]/

## 2024-03-08 NOTE — ANESTHESIA POSTPROCEDURE EVALUATION
Patient: Brittnee Álvarez    Procedure Summary       Date: 03/08/24 Room / Location: [facility] MAIN OR [address] [place] [address] MAIN OR    Anesthesia Start: 0733 Anesthesia Stop: 0950    Procedure: Right total knee arthroplasty (Right: Knee) Diagnosis: (Osteoarthritis right knee)    Surgeons: Wil Parks MD Anesthesiologist: Chato Jones MD    Anesthesia Type: spinal ASA Status: 3            Anesthesia Type: spinal    Vitals Value Taken Time   /64 03/08/24 0949   Temp 97.3 °F (36.3 °C) 03/08/24 0949   Pulse 59 03/08/24 0949   Resp 20 03/08/24 0949   SpO2 100 % 03/08/24 0949   Vitals shown include unfiled device data.    [facility] AN Post Evaluation:   Patient Evaluated in PACU  Patient Participation: complete - patient participated  Level of Consciousness: awake and alert  Pain Score: 0  Pain Management: adequate  Airway Patency:patent  Dental exam unchanged from preop  Yes    Cardiovascular Status: acceptable  Respiratory Status: acceptable and nasal cannula  Postoperative Hydration acceptable  Comments: Spinal receding, no pain, moving both legs    Jean Bolaños CRNA  3/8/2024 9:50 AM

## 2024-03-08 NOTE — CONSULTS
[facility]    PATIENT'S NAME: HIGINIO FONSECA   ATTENDING PHYSICIAN: Wil Parks MD   CONSULTING PHYSICIAN: Bala Roper MD   PATIENT ACCOUNT#:   [id number]    LOCATION:  [facility] PACU [address] [address]  MEDICAL RECORD #:   [id number]       YOB: 1955  ADMISSION DATE:       03/08/2024      CONSULT DATE:  03/08/2024    REPORT OF CONSULTATION      REASON FOR CONSULTATION:  Right total knee arthroplasty.    HISTORY OF PRESENT ILLNESS:  The patient is a 68-year-old  female with chronic right knee pain and underlying end-stage severe primary osteoarthritis who failed outpatient conservative medical management options.  Scheduled today for above-mentioned procedure by her orthopedic surgeon, Dr. Wil Parks.  Preoperatively, she had spinal block.  Postoperatively transferred to PACU for further monitoring.    PAST MEDICAL HISTORY:  Generalized anxiety, osteoarthritis, paroxysmal atrial fibrillation anticoagulated with Xarelto, degenerative joint disease of lumbar spine, right lower extremity DVT, hypertension, transient ischemic attack, hypothyroidism, fibromyalgia, and migraines.    PAST SURGICAL HISTORY:  Appendectomy, 3 C-sections, hemorrhoidectomy, breast lumpectomy, myomectomy, and left total knee arthroplasty.    MEDICATIONS:  Please see medication reconciliation list.    ALLERGIES:  Radiology contrast.    SOCIAL HISTORY:  No tobacco, alcohol, or drug use.  Lives with her family.  Independent in her basic activities of daily living.    FAMILY HISTORY:  Father had heart disease.  Mother had liver cancer.    REVIEW OF SYSTEMS:  Currently resting in bed.  No right knee pain.  No chest pain, no shortness of breath.  Other 12-point review of systems negative.      PHYSICAL EXAMINATION:    GENERAL:  Alert.  Oriented to time, place, and person.  No acute distress.  VITAL SIGNS:  Temperature 97.3, pulse 63, respiratory rate 15, blood pressure 118/59, pulse ox 100% on 2L nasal  cannula oxygen.  HEENT:  Atraumatic.  Oropharynx clear, moist mucous membranes.  Normal hard and soft palate.  Eyes:  Anicteric sclerae.  NECK:  Supple.  No lymphadenopathy.  LUNGS:  Clear to auscultation bilaterally.  Normal respiratory effort.  HEART:  Regular rate and rhythm.  S1, S2 auscultated.  Monitor shows sinus rhythm.  ABDOMEN:  Soft, nondistended, no tenderness.  Positive bowel sounds.  EXTREMITIES:  Right knee dressing.  No leg edema.  No clubbing or cyanosis.  NEUROLOGIC:  Decreased sensation and muscle movement both lower extremities consistent with spinal block.  Otherwise no focal findings.    ASSESSMENT AND PLAN:    1.   Right knee primary osteoarthritis, status post right total knee arthroplasty.  Spinal block, pain control, neurovascular checks, Xarelto for DVT prophylaxis, physical and occupational therapy.  2.   History of paroxysmal atrial fibrillation.  Continue home medications and monitor.  Resume full dose Xarelto when okay with Orthopedic Surgery.  3.   Essential hypertension.  Continue home medications and monitor.  4.   Hyperlipidemia.  Continue home medication.    Dictated By Bala Roper MD  d: 03/08/2024 10:40:10  t: 03/08/2024 11:08:42  Job [id number]  FB/

## 2024-03-08 NOTE — ANESTHESIA PROCEDURE NOTES
Spinal Block    Date/Time: 3/8/2024 7:38 AM    Performed by: Jean Bolaños CRNA  Authorized by: Chato Jones MD      General Information and Staff    Start Time:  3/8/2024 7:38 AM  End Time:  3/8/2024 7:47 AM  Anesthesiologist:  Chato Jones MD  CRNA:  Jean Bolaños CRNA  Performed by:  CRNA  Preanesthetic Checklist: patient identified, IV checked, risks and benefits discussed, monitors and equipment checked, pre-op evaluation, timeout performed, anesthesia consent and sterile technique used      Procedure Details    Patient Position:  Sitting  Prep: ChloraPrep    Monitoring:  Cardiac monitor  Approach:  Midline  Location:  L3-4  Injection Technique:  Single-shot    Needle    Needle Type:  Pencil-tip  Needle Gauge:  24 G  Needle Length:  3.5 in    Assessment    Sensory Level:   Events: clear CSF, CSF aspirated, well tolerated and blood negative      Additional Comments

## 2024-03-08 NOTE — BRIEF OP NOTE
Pre-Operative Diagnosis: Osteoarthritis right knee     Post-Operative Diagnosis: Osteoarthritis right knee      Procedure Performed:   Right total knee arthroplasty    Surgeon(s) and Role:     * Karina Parks MD - Primary    Assistant(s):  Surgical Assistant.: April Xie  PA: Enzo Zurita PA-C     Surgical Findings: OA     Specimen: path     Estimated Blood Loss: Blood Output: 50 mL (3/8/2024  9:24 AM)      Dictation Number:  [id number]    KARINA PARKS MD  3/8/2024  9:28 AM

## 2024-03-09 PROBLEM — G89.18 POSTOPERATIVE PAIN: Status: ACTIVE | Noted: 2024-03-09

## 2024-03-09 LAB
DEPRECATED RDW RBC AUTO: 42.1 FL (ref 35.1–46.3)
ERYTHROCYTE [DISTWIDTH] IN BLOOD BY AUTOMATED COUNT: 12.6 % (ref 11–15)
HCT VFR BLD AUTO: 29.8 %
HGB BLD-MCNC: 10.3 G/DL
MCH RBC QN AUTO: 31.8 PG (ref 26–34)
MCHC RBC AUTO-ENTMCNC: 34.6 G/DL (ref 31–37)
MCV RBC AUTO: 92 FL
PLATELET # BLD AUTO: 142 10(3)UL (ref 150–450)
RBC # BLD AUTO: 3.24 X10(6)UL
WBC # BLD AUTO: 6.8 X10(3) UL (ref 4–11)

## 2024-03-09 PROCEDURE — 99239 HOSP IP/OBS DSCHRG MGMT >30: CPT | Performed by: HOSPITALIST

## 2024-03-09 RX ORDER — HYDROCODONE BITARTRATE AND ACETAMINOPHEN 7.5; 325 MG/1; MG/1
1 TABLET ORAL EVERY 6 HOURS PRN
Status: DISCONTINUED | OUTPATIENT
Start: 2024-03-09 | End: 2024-03-10

## 2024-03-09 NOTE — PLAN OF CARE
Pt is A&Ox4. 2L. SCDs and xarelto for dvt prophylaxis. Zamarripa in place. Gel ice. PRN norco for pain management. WBAT. General diet. NS @125. IV ancef. R knee-dermabond, mepilex, 4x4 and acewrap. Plan for HHC pending PT/OT eval tomorrow.     Problem: Patient Centered Care  Goal: Patient preferences are identified and integrated in the patient's plan of care  Description: Interventions:  - What would you like us to know as we care for you?   - Provide timely, complete, and accurate information to patient/family  - Incorporate patient and family knowledge, values, beliefs, and cultural backgrounds into the planning and delivery of care  - Encourage patient/family to participate in care and decision-making at the level they choose  - Honor patient and family perspectives and choices  Outcome: Progressing     Problem: Patient/Family Goals  Goal: Patient/Family Long Term Goal  Description: Patient's Long Term Goal:     Interventions:  -   - See additional Care Plan goals for specific interventions  Outcome: Progressing  Goal: Patient/Family Short Term Goal  Description: Patient's Short Term Goal:     Interventions:   -  - See additional Care Plan goals for specific interventions  Outcome: Progressing     Problem: PAIN - ADULT  Goal: Verbalizes/displays adequate comfort level or patient's stated pain goal  Description: INTERVENTIONS:  - Encourage pt to monitor pain and request assistance  - Assess pain using appropriate pain scale  - Administer analgesics based on type and severity of pain and evaluate response  - Implement non-pharmacological measures as appropriate and evaluate response  - Consider cultural and social influences on pain and pain management  - Manage/alleviate anxiety  - Utilize distraction and/or relaxation techniques  - Monitor for opioid side effects  - Notify MD/LIP if interventions unsuccessful or patient reports new pain  - Anticipate increased pain with activity and pre-medicate as  appropriate  Outcome: Progressing     Problem: RISK FOR INFECTION - ADULT  Goal: Absence of fever/infection during anticipated neutropenic period  Description: INTERVENTIONS  - Monitor WBC  - Administer growth factors as ordered  - Implement neutropenic guidelines  Outcome: Progressing     Problem: SAFETY ADULT - FALL  Goal: Free from fall injury  Description: INTERVENTIONS:  - Assess pt frequently for physical needs  - Identify cognitive and physical deficits and behaviors that affect risk of falls.  - [place] fall precautions as indicated by assessment.  - Educate pt/family on patient safety including physical limitations  - Instruct pt to call for assistance with activity based on assessment  - Modify environment to reduce risk of injury  - Provide assistive devices as appropriate  - Consider OT/PT consult to assist with strengthening/mobility  - Encourage toileting schedule  Outcome: Progressing     Problem: DISCHARGE PLANNING  Goal: Discharge to home or other facility with appropriate resources  Description: INTERVENTIONS:  - Identify barriers to discharge w/pt and caregiver  - Include patient/family/discharge partner in discharge planning  - Arrange for needed discharge resources and transportation as appropriate  - Identify discharge learning needs (meds, wound care, etc)  - Arrange for interpreters to assist at discharge as needed  - Consider post-discharge preferences of patient/family/discharge partner  - Complete POLST form as appropriate  - Assess patient's ability to be responsible for managing their own health  - Refer to Case Management Department for coordinating discharge planning if the patient needs post-hospital services based on physician/LIP order or complex needs related to functional status, cognitive ability or social support system  Outcome: Progressing

## 2024-03-09 NOTE — CM/SW NOTE
03/09/24 1200   CM/SW Referral Data   Referral Source Physician   Reason for Referral Discharge planning   Informant Patient   Medical Hx   Does patient have an established PCP? Yes   Patient Info   Patient's Current Mental Status at Time of Assessment Alert;Oriented   Patient's Home Environment House   Patient lives with Spouse/Significant other;Daughter  (Haley)   Patient Status Prior to Admission   Independent with ADLs and Mobility Yes   Discharge Needs   Anticipated D/C needs Home health care   Choice of Post-Acute Provider   Informed patient of right to choose their preferred provider Yes   List of appropriate post-acute services provided to patient/family with quality data Yes   Patient/family choice [facility]     Anticipated therapy need: Home with Home Healthcare    Met with pt and dtr Haley at bedside to confirm dc plan.   Per daughter she agree that pt will need therapy on discharge and agrees to HH.    HH list provided and reviewed.   They agree to use [facility] on dc.  [facility] res via [facility] Liaison notified of choice.    [facility]  P:[phone]  F:[phone]    Plan: Home w/ HH when stable.     / to remain available for support and/or discharge planning.   Renee Mclaughlin RN, BSN  Nurse   [phone]

## 2024-03-09 NOTE — HOME CARE LIAISON
Referral received from SW/CM team via [facility]. Discussed with patient's dtr Haley the recommendation for home health services, dtr agreeable, and all questions answered. Updated CM Renee COHEN

## 2024-03-09 NOTE — PHYSICAL THERAPY NOTE
PHYSICAL THERAPY KNEE EVALUATION - INPATIENT      Room Number: 401/401-A  Evaluation Date: 3/9/2024  Type of Evaluation: Initial  Physician Order: PT Eval and Treat    Presenting Problem: R TKR  Co-Morbidities : R DJD knee  Reason for Therapy: Mobility Dysfunction and Discharge Planning    PHYSICAL THERAPY ASSESSMENT   Patient is a 68 year old female admitted 3/8/2024 for R TKR WBAT.  Prior to admission, patient's baseline is indep with all mobility and ADL's lives with spouse Taiwanese is primary language.  Patient is currently functioning below baseline with bed mobility, transfers, gait, stair negotiation, maintaining seated position, standing prolonged periods, and performing household tasks.  Patient is requiring supervision as a result of the following impairments: decreased functional strength, decreased endurance/aerobic capacity, pain, impaired standing balance, decreased muscular endurance, medical status, and limited R knee ROM.  Physical Therapy will continue to follow for duration of hospitalization.    Patient will benefit from continued skilled PT Services at discharge to promote functional independence and safety with additional support and return home with home health PT.    PLAN  PT Treatment Plan: Bed mobility;Body mechanics;Endurance;Energy conservation;Patient education;Gait training;Family education;Range of motion;Strengthening;Transfer training;Balance training  Rehab Potential : Good  Frequency (Obs): Daily    PHYSICAL THERAPY MEDICAL/SOCIAL HISTORY   History related to current admission: R TKR     Problem List  Principal Problem:    Primary osteoarthritis of right knee  Active Problems:    Atrial fibrillation with rapid ventricular response (HCC)    Essential hypertension    Dyslipidemia      HOME SITUATION  Home Situation  Type of Home: House  Home Layout: Two level  Stairs to Enter : 3  Railing: Yes  Stairs to Bedroom: 6  Railing: Yes  Lives With: Spouse  Drives: Yes  Patient Owned  Equipment: None     Prior Level of [place]: Lives at home with spouse indep with all  mobility and ADL's drives.     SUBJECTIVE  I feel dizziness walking and weakness so I prefer to return to bed to sleep after we are done with my PT session.     PHYSICAL THERAPY EXAMINATION   OBJECTIVE  Precautions: Limb alert - right  Fall Risk: Standard fall risk    WEIGHT BEARING RESTRICTION  Weight Bearing Restriction: R lower extremity        R Lower Extremity: Weight Bearing as Tolerated       PAIN ASSESSMENT  Ratin  Location: R knee with movement  Management Techniques: Activity promotion;Body mechanics;Breathing techniques;Relaxation;Repositioning    COGNITION  Overall Cognitive Status:  WFL - within functional limits  Armenian contract  utilized  RANGE OF MOTION AND STRENGTH ASSESSMENT  Upper extremity ROM and strength are within functional limits   Lower extremity ROM is within functional limits   Lower extremity strength is within functional limits R knee 0-55 gaurded AROM at EOB    BALANCE  Static Sitting: Fair +  Dynamic Sitting: Fair  Static Standing: Fair -  Dynamic Standing: Fair -                                                                         ACTIVITY TOLERANCE  Pulse: 72  Heart Rate Source: Monitor  Resp: 16  BP: 111/53  BP Location: Right arm  BP Method: Automatic  Patient Position: Sitting    O2 WALK  Oxygen Therapy  SPO2% on Room Air at Rest: 97  SPO2% Ambulation on Room Air: 88    AM-PAC '6-Clicks' INPATIENT SHORT FORM - BASIC MOBILITY  How much difficulty does the patient currently have...  Patient Difficulty: Turning over in bed (including adjusting bedclothes, sheets and blankets)?: A Little   Patient Difficulty: Sitting down on and standing up from a chair with arms (e.g., wheelchair, bedside commode, etc.): A Little   Patient Difficulty: Moving from lying on back to sitting on the side of the bed?: A Little   How much help from another person does the patient currently need...    Help from Another: Moving to and from a bed to a chair (including a wheelchair)?: A Little   Help from Another: Need to walk in hospital room?: A Little   Help from Another: Climbing 3-5 steps with a railing?: A Lot     AM-PAC Score:  Raw Score: 17   Approx Degree of Impairment: 50.57%   Standardized Score (AM-PAC Scale): 42.13   CMS Modifier (G-Code): CK     FUNCTIONAL ABILITY STATUS  Functional Mobility/Gait Assessment  Gait Assistance: Contact guard assist  Distance (ft): 80  Assistive Device: Rolling walker  Pattern: R Decreased stance time (step to gait c/o dizziness with amb 02 sats 88% on room air with amb 96% sitting after amb)  Rolling: supervision  Supine to Sit: supervision  Sit to Supine: supervision  Sit to Stand: supervision    Exercise/Education Provided:  Bed mobility  Body mechanics  Energy conservation  Functional activity tolerated  Gait training  Posture  ROM  Strengthening  Lower therapeutic exercise:  Ankle pumps  Heel slides  Quad sets  Transfer training    The patient's Approx Degree of Impairment: 50.57% has been calculated based on documentation in the [facility] '6 clicks' Inpatient Basic Mobility Short Form.  Research supports that patients with this level of impairment may benefit from Home assist [facility] PT.  Final disposition will be made by interdisciplinary medical team.    Patient End of Session: In bed;Up in chair;Needs met;Call light within reach;RN aware of session/findings;All patient questions and concerns addressed;Alarm set;Ice applied    CURRENT GOALS  Goals to be met by: 3/25/2024  Patient Goal Patient's self-stated goal is: Return home    Goal #1 Patient is able to demonstrate supine - sit EOB @ level: modified independent     Goal #1   Current Status    Goal #2 Patient is able to demonstrate transfers Sit to/from Stand at assistance level: modified independent     Goal #2  Current Status    Goal #3 Patient is able to ambulate 300 feet with assistive device at assistance level:  modified independent    Goal #3   Current Status    Goal #4 Patient will negotiate 14 stairs/one curb w/ assistive device and supervision   Goal #4   Current Status    Goal #5  AROM 0 degrees extension to 95 degrees flexion     Goal #5   Current Status    Goal #6 Patient independently performs home exercise program for ROM/strengthening per the instructions provided in preparation for discharge.   Goal #6  Current Status      Patient Evaluation Complexity Level:  History Low - no personal factors and/or co-morbidities   Examination of body systems Low -  addressing 1-2 elements   Clinical Presentation Low- Stable   Clinical Decision Making  Low Complexity     Gait Training: 15 minutes

## 2024-03-09 NOTE — OCCUPATIONAL THERAPY NOTE
OCCUPATIONAL THERAPY EVALUATION - INPATIENT     Room Number: 401/401-A  Evaluation Date: 3/9/2024  Type of Evaluation: Initial  Presenting Problem: Patient postop day #1 right  total replacement.  Continue DVT prophylaxis with Xarelto    Physician Order: IP Consult to Occupational Therapy  Reason for Therapy: ADL/IADL Dysfunction and Discharge Planning    OCCUPATIONAL THERAPY ASSESSMENT   Patient is a 68 year old female admitted 3/8/2024 for dec rom to RLE.  Prior to admission, patient's baseline is independent.  Patient is currently functioning near baseline with  basic self care tasks .  Patient is requiring supervision as a result of the following impairments:  dec strength and range of motion to RLE . Occupational Therapy will continue to follow for duration of hospitalization.    Patient will not benefit from continued skilled OT Services as pt is supervision for all self care tasks     PLAN  Patient has been evaluated and presents with no skilled Occupational Therapy needs  at this time.  Patient will be discharged from Occupational Therapy services. Please re-order if a new functional limitation presents during this admission.     OCCUPATIONAL THERAPY MEDICAL/SOCIAL HISTORY   Problem List  Principal Problem:    Primary osteoarthritis of right knee  Active Problems:    Atrial fibrillation with rapid ventricular response (HCC)    Essential hypertension    Dyslipidemia    HOME SITUATION  Type of Home: House  Home Layout: Two level  Lives With: Spouse  Toilet and Equipment: Standard height toilet  Shower/Tub and Equipment: Tub-shower combo; Shower chair  Drives: No    Stairs in Home: none  Use of Assistive Device(s): rolling walker    Prior Level of [place]: independent     SUBJECTIVE  No complaints    OCCUPATIONAL THERAPY EXAMINATION    OBJECTIVE  Precautions: Limb alert - right  Fall Risk: Standard fall risk    WEIGHT BEARING RESTRICTION  R Lower Extremity: Weight Bearing as Tolerated    PAIN  ASSESSMENT  Rating: 3  Location: KNEE    COGNITION  Overall Cognitive Status:  WFL - within functional limits    VISION  Current Vision: no visual deficits    Communication: able to communicate all wants in needs.  Bermudian speaking    Behavioral/Emotional/Social: cooperative and kind    RANGE OF MOTION   Upper extremity ROM is within functional limits     STRENGTH ASSESSMENT  Upper extremity strength is within functional limits     COORDINATION  Gross Motor: WFL   Fine Motor: WFL     ACTIVITIES OF DAILY LIVING ASSESSMENT  AM-PAC ‘6-Clicks’ Inpatient Daily Activity Short Form  How much help from another person does the patient currently need…  -   Putting on and taking off regular lower body clothing?: A Little  -   Bathing (including washing, rinsing, drying)?: A Little  -   Toileting, which includes using toilet, bedpan or urinal? : None  -   Putting on and taking off regular upper body clothing?: None  -   Taking care of personal grooming such as brushing teeth?: None  -   Eating meals?: None    AM-PAC Score:  Score: 22  Approx Degree of Impairment: 25.8%  Standardized Score (AM-PAC Scale): 47.1  CMS Modifier (G-Code): CJ    FUNCTIONAL TRANSFER ASSESSMENT  Sit to Stand: Edge of Bed  Edge of Bed: Supervision  Toilet Transfer: Supervision       Skilled Therapy Provided: compensatory tech for self care tasks    EDUCATION PROVIDED  Patient: Role of Occupational Therapy; Discharge Recommendations; Functional Transfer Techniques; Energy Conservation; Compensatory ADL Techniques  Patient's Response to Education: Verbalized Understanding  Family/Caregiver: Role of Occupational Therapy; Plan of Care; Discharge Recommendations; Functional Transfer Techniques; Compensatory ADL Techniques  Family/Caregiver's Response to Education: Verbalized Understanding    The patient's Approx Degree of Impairment: 25.8% has been calculated based on documentation in the [facility] '6 clicks' Inpatient Daily Activity Short Form.  Research  supports that patients with this level of impairment may benefit from home w/ supervision from family.  Final disposition will be made by interdisciplinary medical team.    Patient End of Session: Up in chair;Needs met;Call light within reach;RN aware of session/findings;Ice applied    Patient was able to achieve the following ...   Patient able to toilet transfer  safely and independently    Patient able to dress lower extremities  safely and independently    Patient/Caregiver able to demonstrate safety with ADLS  safely and independently     Patient Evaluation Complexity Level:   Occupational Profile/Medical History LOW - Brief history including review of medical or therapy records    Specific performance deficits impacting engagement in ADL/IADL LOW  1 - 3 performance deficits    Client Assessment/Performance Deficits LOW - No comorbidities nor modifications of tasks    Clinical Decision Making LOW - Analysis of occupational profile, problem-focused assessments, limited treatment options    Overall Complexity LOW     OT Session Time: 18 minutes  Self-Care Home Management: 18 minutes

## 2024-03-09 NOTE — PLAN OF CARE
Patient alert and orientated x4. Belarusian speaking. Post-op day #1. Dressing in place to right knee. VSS. Tolerating diet. Check void. Patient is on room air. SCDs on for DVT prophylaxis. PRN norco and dilaudid given for Pain medication provided as needed. Up with x1 assist and a walker- pt reported some dizziness when ambulating. Encouraged frequent ambulation and use of incentive spirometer. Fall precautions maintained- bed alarm on, bed locked in lowest position, call light and personal belongings within reach, non-skid socks in place to bilateral feet. Frequent rounding by nursing staff. Plan to discharge tomorrow home with home health.                 Problem: Patient Centered Care  Goal: Patient preferences are identified and integrated in the patient's plan of care  Description: Interventions:  - What would you like us to know as we care for you?   - Provide timely, complete, and accurate information to patient/family  - Incorporate patient and family knowledge, values, beliefs, and cultural backgrounds into the planning and delivery of care  - Encourage patient/family to participate in care and decision-making at the level they choose  - Honor patient and family perspectives and choices  Outcome: Progressing     Problem: Patient/Family Goals  Goal: Patient/Family Long Term Goal  Description: Patient's Long Term Goal:     Interventions:  - See additional Care Plan goals for specific interventions  Outcome: Progressing  Goal: Patient/Family Short Term Goal  Description: Patient's Short Term Goal:     Interventions:  - See additional Care Plan goals for specific interventions  Outcome: Progressing     Problem: PAIN - ADULT  Goal: Verbalizes/displays adequate comfort level or patient's stated pain goal  Description: INTERVENTIONS:  - Encourage pt to monitor pain and request assistance  - Assess pain using appropriate pain scale  - Administer analgesics based on type and severity of pain and evaluate response  -  Implement non-pharmacological measures as appropriate and evaluate response  - Consider cultural and social influences on pain and pain management  - Manage/alleviate anxiety  - Utilize distraction and/or relaxation techniques  - Monitor for opioid side effects  - Notify MD/LIP if interventions unsuccessful or patient reports new pain  - Anticipate increased pain with activity and pre-medicate as appropriate  Outcome: Progressing     Problem: RISK FOR INFECTION - ADULT  Goal: Absence of fever/infection during anticipated neutropenic period  Description: INTERVENTIONS  - Monitor WBC  - Administer growth factors as ordered  - Implement neutropenic guidelines  Outcome: Progressing     Problem: SAFETY ADULT - FALL  Goal: Free from fall injury  Description: INTERVENTIONS:  - Assess pt frequently for physical needs  - Identify cognitive and physical deficits and behaviors that affect risk of falls.  - [place] fall precautions as indicated by assessment.  - Educate pt/family on patient safety including physical limitations  - Instruct pt to call for assistance with activity based on assessment  - Modify environment to reduce risk of injury  - Provide assistive devices as appropriate  - Consider OT/PT consult to assist with strengthening/mobility  - Encourage toileting schedule  Outcome: Progressing     Problem: DISCHARGE PLANNING  Goal: Discharge to home or other facility with appropriate resources  Description: INTERVENTIONS:  - Identify barriers to discharge w/pt and caregiver  - Include patient/family/discharge partner in discharge planning  - Arrange for needed discharge resources and transportation as appropriate  - Identify discharge learning needs (meds, wound care, etc)  - Arrange for interpreters to assist at discharge as needed  - Consider post-discharge preferences of patient/family/discharge partner  - Complete POLST form as appropriate  - Assess patient's ability to be responsible for managing their own  health  - Refer to Case Management Department for coordinating discharge planning if the patient needs post-hospital services based on physician/LIP order or complex needs related to functional status, cognitive ability or social support system  Outcome: Progressing

## 2024-03-09 NOTE — PROGRESS NOTES
[facility]  part of [facility]    Progress Note    Brittnee Álvarez Patient Status:  Inpatient    1955 MRN [id number]   Location [facility] 4W/SW/SE Attending Mariza Clarke,    Hosp Day # 1 PCP Rafaela Mccodr MD         Subjective:   Subjective: Patient awake, lying in bed.  Daughter at bedside, daughter translated.  Patient reports she is overall doing well.  Endorses some right knee pain.  Denies any numbness, tingling, or calf pain.    Objective:   Blood pressure 114/55, pulse 74, temperature 97.8 °F (36.6 °C), temperature source Oral, resp. rate 16, height 5' 8\" (1.727 m), weight 171 lb (77.6 kg), SpO2 92%.  Physical Exam  Dressing on, clean and dry.  Bilateral calf soft, nontender.  Patient able to dorsiflex and plantarflex, strength 5/5.  Denies numbness and tingling.    Results:   Lab Results   Component Value Date    WBC 6.8 2024    HGB 10.3 (L) 2024    HCT 29.8 (L) 2024    .0 (L) 2024    CREATSERUM 1.01 2024    BUN 19 2024     2024    K 4.7 2024     2024    CO2 27.0 2024    GLU 92 2024    CA 10.2 2024    ALB 4.1 2024    ALKPHO 91 2024    BILT 0.5 2024    TP 7.1 2024    AST 47 (H) 2024    ALT 50 (H) 2024    PTT 41.8 (H) 2024    INR 1.00 2024    TSH 1.890 2023    ESRML 16 2023    MG 2.1 07/10/2023    PHOS 2.7 07/10/2023    TROPHS 11 07/10/2023       XR KNEE (1 OR 2 VIEWS), RIGHT (CPT=73560)    Result Date: 3/8/2024  CONCLUSION: Status post right total knee arthroplasty without evidence of hardware complication.    Dictated by (CST): Bob García MD on 3/08/2024 at 12:53 PM     Finalized by (CST): Bob García MD on 3/08/2024 at 12:53 PM               Assessment & Plan:     Primary osteoarthritis of right knee  Patient postop day #1 right  total replacement.  Continue DVT prophylaxis with Xarelto.  She is planning  on discharging home with daughter.  Demonstrated range of motion exercises with the patient.       Atrial fibrillation with rapid ventricular response (HCC)  Per medicine      Essential hypertension  Per medicine      Dyslipidemia  Per medicine              PATSY Jimenez  3/9/2024

## 2024-03-09 NOTE — DISCHARGE SUMMARY
[facility]  part of [facility]    Discharge Summary    Brittnee Álvarez Patient Status:  Inpatient    1955 MRN [id number]   Location [facility] 4W/SW/SE Attending Mariza Clarke DO   Hosp Day # 1 PCP Rafaela Mccord MD     Date of Admission: 3/8/2024 Disposition: Home or Self Care     Date of Discharge: 3/9/2024      Admitting Diagnosis: Osteoarthritis right knee  Primary osteoarthritis of right knee    Hospital Discharge Diagnoses:  right knee oa     Hospital Discharge Diagnoses:  right knee oa     Lace+ Score: 22  59-90 High Risk  29-58 Medium Risk  0-28   Low Risk.    TCM Follow-Up Recommendation:  LACE < 29: Low Risk of readmission after discharge from the hospital. No TCM follow-up needed.          Lace+ Score: 22  59-90 High Risk  29-58 Medium Risk  0-28   Low Risk    Risk of readmission: Brittnee Álvarez has Low Risk of readmission after discharge from the hospital.    Problem List:   Patient Active Problem List   Diagnosis    Atrial fibrillation with rapid ventricular response (HCC)    Essential hypertension    Primary osteoarthritis of both knees    Anxiety    Primary osteoarthritis of left knee    Dyslipidemia    Hypothyroidism    Paroxysmal atrial fibrillation (HCC)    Acute deep vein thrombosis (DVT) of left peroneal vein (HCC)    Osteoarthritis of carpometacarpal (CMC) joints of both thumbs    Varicose veins of both lower extremities with pain    Sedative, hypnotic or anxiolytic dependence, uncomplicated (HCC)    Localized edema    Vitamin D deficiency    Chest pain of uncertain etiology    Primary osteoarthritis of right knee       Reason for Admission:     Physical Exam:   General appearance: alert, appears stated age and cooperative  Pulmonary:  clear to auscultation bilaterally  Cardiovascular: S1, S2 normal, no murmur, click, rub or gallop, regular rate and rhythm  Abdominal: soft, non-tender; bowel sounds normal; no masses,  no  organomegaly  Extremities: extremities normal, atraumatic, no cyanosis or edema  Psychiatric: calm        History of Present Illness: Brittnee Álvarez is a(n) 68 year old female. She presents with pain in her right knee.  We did her left knee replacement in 2019.  She is very happy with the results.  She has been under the care of Dr. Alka Parks for her right knee osteoarthritis.  She has had multiple injections of cortisone hyaluronic, the last one in November.  She is also taking Duloxetine and Cymbalta.  She has had physical therapy as well.  She has pain walking even short distances, and difficulty getting in and out of a chair.     Hospital Course:       ASSESSMENT AND PLAN:    1.       Right knee primary osteoarthritis, status post right total knee arthroplasty.  Spinal block, pain control, neurovascular checks, Xarelto for DVT prophylaxis, physical and occupational therapy.  2.       History of paroxysmal atrial fibrillation.  Continue home medications and monitor.  Resume full dose Xarelto when okay with Orthopedic Surgery.  3.       Essential hypertension.  Continue home medications and monitor.  4.       Hyperlipidemia.  Continue home medication.       Consultations: Dr Parks     Procedures: as above     Complications: none    Discharge Condition: Good    Discharge Medications:      Discharge Medications        START taking these medications        Instructions Prescription details   docusate sodium 100 MG Caps  Commonly known as: COLACE      Take 100 mg by mouth 2 (two) times daily.   Quantity: 20 capsule  Refills: 0     ferrous sulfate 325 (65 FE) MG Tbec      Take 1 tablet (325 mg total) by mouth daily with breakfast.   Quantity: 20 tablet  Refills: 0     HYDROcodone-acetaminophen 7.5-325 MG Tabs  Commonly known as: Norco      Take 1 tablet by mouth every 6 (six) hours as needed.   Quantity: 40 tablet  Refills: 0            CONTINUE taking these medications        Instructions Prescription  details   acetaminophen 500 MG Tabs  Commonly known as: Tylenol Extra Strength      Take 1 tablet (500 mg total) by mouth every 6 (six) hours as needed for Fever or Pain (equal to or greater than 100.4).   Quantity: 1 tablet  Refills: 0     amiodarone 200 MG Tabs  Commonly known as: Pacerone      Take 0.5 tablets (100 mg total) by mouth daily.   Quantity: 90 tablet  Refills: 3     atorvastatin 10 MG Tabs  Commonly known as: Lipitor      Take 1 tablet (10 mg total) by mouth nightly.   Quantity: 90 tablet  Refills: 3     DULoxetine 30 MG Cpep  Commonly known as: Cymbalta      2 capsules (60 mg total) daily.   Refills: 0     losartan 25 MG Tabs  Commonly known as: Cozaar      Take 1 tablet (25 mg total) by mouth daily.   Refills: 0     metoprolol succinate ER 25 MG Tb24  Commonly known as: Toprol XL      Take 1 tablet (25 mg total) by mouth daily.   Quantity: 30 tablet  Refills: 2     pantoprazole 40 MG Tbec  Commonly known as: Protonix      Take 1 tablet (40 mg total) by mouth before breakfast.   Quantity: 90 tablet  Refills: 3     rivaroxaban 20 MG Tabs  Commonly known as: Xarelto      Take 1 tablet (20 mg total) by mouth daily with food.   Quantity: 90 tablet  Refills: 1     traMADol 50 MG Tabs  Commonly known as: Ultram      Take 1 tablet (50 mg total) by mouth every 6 (six) hours as needed for Pain.   Refills: 0               Where to Get Your Medications        These medications were sent to [id number] [place], [place] - [address], [phone], [phone]  [address] [address]      Phone: [phone]   docusate sodium 100 MG Caps  ferrous sulfate 325 (65 FE) MG Tbec  HYDROcodone-acetaminophen 7.5-325 MG Tabs         Follow up Visits: Follow-up with ortho  in 1 week    Follow up Labs: none     Other Discharge Instructions: none    Mariza Clarke DO  3/9/2024  4:15 PM    > 35 min

## 2024-03-09 NOTE — ANESTHESIA POST-OP FOLLOW-UP NOTE
S/p Duramorph. Strength and sensation back intact. Nausea itching controlled. Denies headache or backpain.

## 2024-03-09 NOTE — CM/SW NOTE
SW received MDO for discharge planning. Tentative referral made to multiple [facility] agencies pending acceptance. SW will open pt tomorrow to complete full assessment.     F2F entered    PLAN: home with HHC - pending accept    SW/CM to remain available for support and/or discharge planning.     Ingrid Bates, MSW, LSW [phone]

## 2024-03-09 NOTE — PLAN OF CARE
Alert and oriented x4. Right total knee. POD 1. WBAT has not gotten up yet. CPM ordered. Zamarripa to be removed in morning. Pain control. RA. Plan for [facility].  Problem: Patient Centered Care  Goal: Patient preferences are identified and integrated in the patient's plan of care  Description: Interventions:  - What would you like us to know as we care for you? Home with daughter  - Provide timely, complete, and accurate information to patient/family  - Incorporate patient and family knowledge, values, beliefs, and cultural backgrounds into the planning and delivery of care  - Encourage patient/family to participate in care and decision-making at the level they choose  - Honor patient and family perspectives and choices  Outcome: Progressing     Problem: Patient/Family Goals  Goal: Patient/Family Long Term Goal  Description: Patient's Long Term Goal: pain management    Interventions:  - pain administration  - See additional Care Plan goals for specific interventions  Outcome: Progressing  Goal: Patient/Family Short Term Goal  Description: Patient's Short Term Goal: safe ambulation    Interventions:   - calling for assistance out of bed.  - See additional Care Plan goals for specific interventions  Outcome: Progressing     Problem: PAIN - ADULT  Goal: Verbalizes/displays adequate comfort level or patient's stated pain goal  Description: INTERVENTIONS:  - Encourage pt to monitor pain and request assistance  - Assess pain using appropriate pain scale  - Administer analgesics based on type and severity of pain and evaluate response  - Implement non-pharmacological measures as appropriate and evaluate response  - Consider cultural and social influences on pain and pain management  - Manage/alleviate anxiety  - Utilize distraction and/or relaxation techniques  - Monitor for opioid side effects  - Notify MD/LIP if interventions unsuccessful or patient reports new pain  - Anticipate increased pain with activity and pre-medicate as  appropriate  Outcome: Progressing     Problem: RISK FOR INFECTION - ADULT  Goal: Absence of fever/infection during anticipated neutropenic period  Description: INTERVENTIONS  - Monitor WBC  - Administer growth factors as ordered  - Implement neutropenic guidelines  Outcome: Progressing     Problem: SAFETY ADULT - FALL  Goal: Free from fall injury  Description: INTERVENTIONS:  - Assess pt frequently for physical needs  - Identify cognitive and physical deficits and behaviors that affect risk of falls.  - [place] fall precautions as indicated by assessment.  - Educate pt/family on patient safety including physical limitations  - Instruct pt to call for assistance with activity based on assessment  - Modify environment to reduce risk of injury  - Provide assistive devices as appropriate  - Consider OT/PT consult to assist with strengthening/mobility  - Encourage toileting schedule  Outcome: Progressing     Problem: DISCHARGE PLANNING  Goal: Discharge to home or other facility with appropriate resources  Description: INTERVENTIONS:  - Identify barriers to discharge w/pt and caregiver  - Include patient/family/discharge partner in discharge planning  - Arrange for needed discharge resources and transportation as appropriate  - Identify discharge learning needs (meds, wound care, etc)  - Arrange for interpreters to assist at discharge as needed  - Consider post-discharge preferences of patient/family/discharge partner  - Complete POLST form as appropriate  - Assess patient's ability to be responsible for managing their own health  - Refer to Case Management Department for coordinating discharge planning if the patient needs post-hospital services based on physician/LIP order or complex needs related to functional status, cognitive ability or social support system  Outcome: Progressing

## 2024-03-10 VITALS
SYSTOLIC BLOOD PRESSURE: 117 MMHG | HEART RATE: 88 BPM | OXYGEN SATURATION: 100 % | WEIGHT: 171 LBS | TEMPERATURE: 97 F | BODY MASS INDEX: 25.91 KG/M2 | HEIGHT: 68 IN | DIASTOLIC BLOOD PRESSURE: 54 MMHG | RESPIRATION RATE: 18 BRPM

## 2024-03-10 RX ORDER — HYDROCODONE BITARTRATE AND ACETAMINOPHEN 7.5; 325 MG/1; MG/1
1 TABLET ORAL EVERY 6 HOURS PRN
Qty: 25 TABLET | Refills: 0 | Status: SHIPPED | OUTPATIENT
Start: 2024-03-10

## 2024-03-10 NOTE — PLAN OF CARE
Patient alert and orientated x4. Post-op day #2. Dressing in place to right knee.VSS. Tolerating diet. Voiding freely. Patient is on room air. SCDs and xarelto for DVT prophylaxis. PRN Norco for pain medication provided as needed. Up with x1 assist and a walker. Encouraged frequent ambulation and use of incentive spirometer. Fall precautions maintained- bed alarm on, bed locked in lowest position, call light and personal belongings within reach, non-skid socks in place to bilateral feet. Frequent rounding by nursing staff. Plan to work with PT/OT, dc home with home health.      Patient cleared by internal medicine, ortho surgery, PT/OT, and social work. Going home with home health. Verified that pain medications are at patient's pharmacy. IV removed, discharge education provided to patient and daughter Haley, patient sent home with all personal belongings, scripts, and discharge instructions. Addressed additional questions.         Problem: Patient Centered Care  Goal: Patient preferences are identified and integrated in the patient's plan of care  Description: Interventions:  - What would you like us to know as we care for you?   - Provide timely, complete, and accurate information to patient/family  - Incorporate patient and family knowledge, values, beliefs, and cultural backgrounds into the planning and delivery of care  - Encourage patient/family to participate in care and decision-making at the level they choose  - Honor patient and family perspectives and choices  Outcome: Adequate for Discharge     Problem: Patient/Family Goals  Goal: Patient/Family Long Term Goal  Description: Patient's Long Term Goal:     Interventions:  - See additional Care Plan goals for specific interventions  Outcome: Adequate for Discharge  Goal: Patient/Family Short Term Goal  Description: Patient's Short Term Goal:     Interventions:   - See additional Care Plan goals for specific interventions  Outcome: Adequate for Discharge      Problem: PAIN - ADULT  Goal: Verbalizes/displays adequate comfort level or patient's stated pain goal  Description: INTERVENTIONS:  - Encourage pt to monitor pain and request assistance  - Assess pain using appropriate pain scale  - Administer analgesics based on type and severity of pain and evaluate response  - Implement non-pharmacological measures as appropriate and evaluate response  - Consider cultural and social influences on pain and pain management  - Manage/alleviate anxiety  - Utilize distraction and/or relaxation techniques  - Monitor for opioid side effects  - Notify MD/LIP if interventions unsuccessful or patient reports new pain  - Anticipate increased pain with activity and pre-medicate as appropriate  Outcome: Adequate for Discharge     Problem: RISK FOR INFECTION - ADULT  Goal: Absence of fever/infection during anticipated neutropenic period  Description: INTERVENTIONS  - Monitor WBC  - Administer growth factors as ordered  - Implement neutropenic guidelines  Outcome: Adequate for Discharge     Problem: SAFETY ADULT - FALL  Goal: Free from fall injury  Description: INTERVENTIONS:  - Assess pt frequently for physical needs  - Identify cognitive and physical deficits and behaviors that affect risk of falls.  - [place] fall precautions as indicated by assessment.  - Educate pt/family on patient safety including physical limitations  - Instruct pt to call for assistance with activity based on assessment  - Modify environment to reduce risk of injury  - Provide assistive devices as appropriate  - Consider OT/PT consult to assist with strengthening/mobility  - Encourage toileting schedule  Outcome: Adequate for Discharge     Problem: DISCHARGE PLANNING  Goal: Discharge to home or other facility with appropriate resources  Description: INTERVENTIONS:  - Identify barriers to discharge w/pt and caregiver  - Include patient/family/discharge partner in discharge planning  - Arrange for needed discharge  resources and transportation as appropriate  - Identify discharge learning needs (meds, wound care, etc)  - Arrange for interpreters to assist at discharge as needed  - Consider post-discharge preferences of patient/family/discharge partner  - Complete POLST form as appropriate  - Assess patient's ability to be responsible for managing their own health  - Refer to Case Management Department for coordinating discharge planning if the patient needs post-hospital services based on physician/LIP order or complex needs related to functional status, cognitive ability or social support system  Outcome: Adequate for Discharge

## 2024-03-10 NOTE — PHYSICAL THERAPY NOTE
PHYSICAL THERAPY KNEE TREATMENT NOTE - INPATIENT     Room Number: 401/401-A             Presenting Problem: R TKR  Co-Morbidities : R DJD knee    Problem List  Principal Problem:    Primary osteoarthritis of right knee  Active Problems:    Atrial fibrillation with rapid ventricular response (HCC)    Essential hypertension    Dyslipidemia    Postoperative pain      PHYSICAL THERAPY ASSESSMENT   Patient demonstrates good  progress this session, goals  remain in progress.    Patient continues to function near baseline with bed mobility, transfers, gait, stair negotiation, and standing prolonged periods.  Contributing factors to remaining limitations include decreased functional strength, decreased endurance/aerobic capacity, pain, and decreased muscular endurance.  Next session anticipate patient to progress bed mobility, transfers, gait, stair negotiation, and standing prolonged periods.  Physical Therapy will continue to follow patient for duration of hospitalization.    Patient continues to benefit from continued skilled PT services: at discharge to promote functional independence and safety with additional support and return home with home health PT.    PLAN  PT Treatment Plan: Bed mobility;Body mechanics;Coordination;Endurance;Patient education;Gait training;Range of motion;Strengthening;Stoop training;Transfer training;Balance training  Frequency (Obs): Daily    SUBJECTIVE  Pt was agreeable to therapy session.         OBJECTIVE  Precautions: Limb alert - right    WEIGHT BEARING STATUS        R Lower Extremity: Weight Bearing as Tolerated       PAIN ASSESSMENT   Rating:  (did not rate)  Location: R knee  Management Techniques: Activity promotion;Body mechanics;Relaxation;Repositioning    BALANCE  Static Sitting: Good  Dynamic Sitting: Fair +  Static Standing: Fair  Dynamic Standing: Fair -    ACTIVITY TOLERANCE                         O2 WALK       AM-PAC '6-Clicks' INPATIENT SHORT FORM - BASIC MOBILITY  How much  difficulty does the patient currently have...  Patient Difficulty: Turning over in bed (including adjusting bedclothes, sheets and blankets)?: A Little   Patient Difficulty: Sitting down on and standing up from a chair with arms (e.g., wheelchair, bedside commode, etc.): A Little   Patient Difficulty: Moving from lying on back to sitting on the side of the bed?: A Little   How much help from another person does the patient currently need...   Help from Another: Moving to and from a bed to a chair (including a wheelchair)?: A Little   Help from Another: Need to walk in hospital room?: A Little   Help from Another: Climbing 3-5 steps with a railing?: A Little     AM-PAC Score:  Raw Score: 18   Approx Degree of Impairment: 46.58%   Standardized Score (AM-PAC Scale): 43.63   CMS Modifier (G-Code): CK    FUNCTIONAL ABILITY STATUS  Functional Mobility/Gait Assessment  Gait Assistance: Supervision  Distance (ft): 200'  Assistive Device: Rolling walker  Pattern: R Decreased stance time  Device:  (RW)  Assist: Contact guard assist  Pattern: Ascend and Descend  Ascend and Descend : Step to  Stoop/Curb: 1 stoop step  Rolling: stand-by assist  Supine to Sit: stand-by assist  Sit to Supine:  NT  Sit to Stand: contact guard assist    Additional Information: Pt is received in the bed with daughter present and was cleared for therapy session. Pt is primarily Bulgarian speaking an daughter translated directions during the session. Pt is SBA with bed mobility and to transfer to the EOB. Pt sat EOB for a few minutes and denied any dizziness and light headedness. Pt is CGA with sit<>stand transfers with the . Pt was able to AMB about 200' with the RW CGA/SBA for balance and safety. Pt with fast gunnar and decreased step length with narrow REBEKAH but with good balance and safety awareness. Pt then was educated and able to negotiate 1 stoop step with the RW CGA for safety. Pt is cued for proper technique and sequencing. Returned pt back to  sitting in the chair with all needs within reach. Pt is on track to dc to home once medically cleared. Reported to the RN on the status of the pt.     The patient's Approx Degree of Impairment: 46.58% has been calculated based on documentation in the [facility] '6 clicks' Inpatient Basic Mobility Short Form.  Research supports that patients with this level of impairment may benefit from home with C and assist .  Final disposition will be made by interdisciplinary medical team.          Knee ROM   R Knee Flexion (degrees): 55     R Knee Extension (degrees): 0       Patient End of Session: Up in chair;Needs met;Call light within reach;RN aware of session/findings;All patient questions and concerns addressed;Family present    CURRENT GOALS  Goals to be met by: 3/25/2024  Patient Goal Patient's self-stated goal is: Return home    Goal #1 Patient is able to demonstrate supine - sit EOB @ level: modified independent     Goal #1   Current Status SBA   Goal #2 Patient is able to demonstrate transfers Sit to/from Stand at assistance level: modified independent     Goal #2  Current Status CGA with the RW   Goal #3 Patient is able to ambulate 300 feet with assistive device at assistance level: modified independent    Goal #3   Current Status 200' with the RW CGA/SBA   Goal #4 Patient will negotiate 14 stairs/one curb w/ assistive device and supervision   Goal #4   Current Status 1 stoop step with the RW CGA Pt's daughter reported that pt has no stairs in the house the pt has to negotiate.    Goal #5  AROM 0 degrees extension to 95 degrees flexion     Goal #5   Current Status IN PROGRESS   Goal #6 Patient independently performs home exercise program for ROM/strengthening per the instructions provided in preparation for discharge.   Goal #6  Current Status IN PROGRESS       Therapeutic Activity: 23 minutes

## 2024-03-10 NOTE — PLAN OF CARE
Brittnee is taking Norco as needed for pain control. SCD's in use. Ambulates with walker and 1 assist. Gel ice packs in use for pain and swelling. Has some slight swelling to right foot, encouraged to do ankle pumps to help keep the swelling down. Voiding freely. Discharge plan is for home with home health, likely today.  Problem: Patient Centered Care  Goal: Patient preferences are identified and integrated in the patient's plan of care  Description: Interventions:  - What would you like us to know as we care for you?   - Provide timely, complete, and accurate information to patient/family  - Incorporate patient and family knowledge, values, beliefs, and cultural backgrounds into the planning and delivery of care  - Encourage patient/family to participate in care and decision-making at the level they choose  - Honor patient and family perspectives and choices  Outcome: Progressing     Problem: Patient/Family Goals  Goal: Patient/Family Long Term Goal  Description: Patient's Long Term Goal:     Interventions:  -   - See additional Care Plan goals for specific interventions  Outcome: Progressing  Goal: Patient/Family Short Term Goal  Description: Patient's Short Term Goal:     Interventions:   -   - See additional Care Plan goals for specific interventions  Outcome: Progressing     Problem: PAIN - ADULT  Goal: Verbalizes/displays adequate comfort level or patient's stated pain goal  Description: INTERVENTIONS:  - Encourage pt to monitor pain and request assistance  - Assess pain using appropriate pain scale  - Administer analgesics based on type and severity of pain and evaluate response  - Implement non-pharmacological measures as appropriate and evaluate response  - Consider cultural and social influences on pain and pain management  - Manage/alleviate anxiety  - Utilize distraction and/or relaxation techniques  - Monitor for opioid side effects  - Notify MD/LIP if interventions unsuccessful or patient reports new  pain  - Anticipate increased pain with activity and pre-medicate as appropriate  Outcome: Progressing     Problem: RISK FOR INFECTION - ADULT  Goal: Absence of fever/infection during anticipated neutropenic period  Description: INTERVENTIONS  - Monitor WBC  - Administer growth factors as ordered  - Implement neutropenic guidelines  Outcome: Progressing     Problem: SAFETY ADULT - FALL  Goal: Free from fall injury  Description: INTERVENTIONS:  - Assess pt frequently for physical needs  - Identify cognitive and physical deficits and behaviors that affect risk of falls.  - [place] fall precautions as indicated by assessment.  - Educate pt/family on patient safety including physical limitations  - Instruct pt to call for assistance with activity based on assessment  - Modify environment to reduce risk of injury  - Provide assistive devices as appropriate  - Consider OT/PT consult to assist with strengthening/mobility  - Encourage toileting schedule  Outcome: Progressing     Problem: DISCHARGE PLANNING  Goal: Discharge to home or other facility with appropriate resources  Description: INTERVENTIONS:  - Identify barriers to discharge w/pt and caregiver  - Include patient/family/discharge partner in discharge planning  - Arrange for needed discharge resources and transportation as appropriate  - Identify discharge learning needs (meds, wound care, etc)  - Arrange for interpreters to assist at discharge as needed  - Consider post-discharge preferences of patient/family/discharge partner  - Complete POLST form as appropriate  - Assess patient's ability to be responsible for managing their own health  - Refer to Case Management Department for coordinating discharge planning if the patient needs post-hospital services based on physician/LIP order or complex needs related to functional status, cognitive ability or social support system  Outcome: Progressing

## 2024-03-10 NOTE — CM/SW NOTE
03/10/24 0800   Discharge disposition   Expected discharge disposition Home-Health   Post Acute Care Provider [facility]   Discharge transportation Private car     [facility] liaison notified of dc today via secure chat.    [facility]  P:[phone]  F:[phone]    Renee Mclaughlin RN, BSN  Nurse   [phone]

## 2024-03-10 NOTE — PROGRESS NOTES
[facility]  part of [facility]    Progress Note    Brittnee Álvarez Patient Status:  Inpatient    1955 MRN [id number]   Location [facility] 4W/SW/SE Attending Mariza Clarke, DO   Hosp Day # 2 PCP Rafaela Mccord MD       Subjective:     Constitutional:         Patient awake and alert on the couch in room.  Daughter visiting.  Going to rehab today.  No new complaints.       Objective:   Blood pressure 117/54, pulse 88, temperature 97.1 °F (36.2 °C), temperature source Oral, resp. rate 18, height 5' 8\" (1.727 m), weight 171 lb (77.6 kg), SpO2 100%.  Physical Exam  Musculoskeletal:      Comments: Good vascular flow normal motor and sensory bilateral lower extremities.  Range of motion 10 to 95 degrees.  Dressing clean.  1+ edema on the legs but no effusion in the knee.  Ordered LAN hose.  No erythema.         Results:   Lab Results   Component Value Date    WBC 6.8 2024    HGB 10.3 (L) 2024    HCT 29.8 (L) 2024    .0 (L) 2024    CREATSERUM 1.01 2024    BUN 19 2024     2024    K 4.7 2024     2024    CO2 27.0 2024    GLU 92 2024    CA 10.2 2024    ALB 4.1 2024    ALKPHO 91 2024    BILT 0.5 2024    TP 7.1 2024    AST 47 (H) 2024    ALT 50 (H) 2024    PTT 41.8 (H) 2024    INR 1.00 2024    TSH 1.890 2023    ESRML 16 2023    MG 2.1 07/10/2023    PHOS 2.7 07/10/2023    TROPHS 11 07/10/2023       No results found.        Assessment & Plan:     Primary osteoarthritis of right knee  Going to rehab.  Encouraged self rehab exercises in both flexion and extension.  Continue Xarelto anticoagulation.  Follow-up Dr. Parks 2 to 3 weeks.  May go to rehab from orthopedic standpoint.      Atrial fibrillation with rapid ventricular response (HCC)  Per neurology and medicine.      Essential hypertension  Per medicine.      Dyslipidemia  Per  medicine.      Postoperative pain  Doing better now.              Vish Grimaldo MD  3/10/2024

## 2024-03-11 ENCOUNTER — TELEPHONE (OUTPATIENT)
Dept: INTERNAL MEDICINE CLINIC | Facility: CLINIC | Age: 69
End: 2024-03-11

## 2024-03-11 NOTE — TELEPHONE ENCOUNTER
Donna with [facility] called to advise Dr. Mccord patient started Nursing and Physical Therapy today 3/11/24.

## 2024-03-12 ENCOUNTER — PATIENT OUTREACH (OUTPATIENT)
Dept: CASE MANAGEMENT | Age: 69
End: 2024-03-12

## 2024-03-12 ENCOUNTER — MED REC SCAN ONLY (OUTPATIENT)
Dept: INTERNAL MEDICINE CLINIC | Facility: CLINIC | Age: 69
End: 2024-03-12

## 2024-03-12 DIAGNOSIS — Z96.651 S/P TOTAL KNEE ARTHROPLASTY, RIGHT: ICD-10-CM

## 2024-03-12 DIAGNOSIS — M17.11 PRIMARY OSTEOARTHRITIS OF RIGHT KNEE: ICD-10-CM

## 2024-03-12 DIAGNOSIS — Z02.9 ENCOUNTERS FOR UNSPECIFIED ADMINISTRATIVE PURPOSE: Primary | ICD-10-CM

## 2024-03-12 PROCEDURE — 1111F DSCHRG MED/CURRENT MED MERGE: CPT

## 2024-03-12 NOTE — PROGRESS NOTES
Initial Post Discharge Follow Up   Discharge Date: 3/10/24  Contact Date: 3/12/2024    Consent Verification:  Assessment Completed With: Patient  HIPAA Verified?  Yes    Discharge Dx:   Osteoarthritis right knee  S/P right total knee arthroplasty    General:   How have you been since your discharge from the hospital? Haley states that her mother is doing good. Haley states her mother denies fever/chills, no shortness of breath, no chest pain, no pain radiating from chest to neck, jaw, shoulders, arms or upper back. Patient denies abdominal pain, no nausea/vomiting. Patient states she has not had a BM as yet, however, she does not eat a lot and states she does not feel uncomfortable or constipated. Haley states that the  nurse and physical therapist were out yesterday 3/11 for start of care. Haley states that her mother has some bruising, no redness, increasing swelling, or drainage seen coming from under the dressing. Patient is able to get up and get back and fourth to the bathroom with walker. Haley reports that her mother is doing the exercises she was taught. [facility] instructed patient to change positions frequently, walk as tolerated, rest when needed, stay hydrated and continue to take up to ten deep breaths an hour while awake, or if watching television take a deep breath with every commercial. [facility] reviewed discharge instructions, medications, S&S of infection/blood clots with Haley, she verbalized understanding of these. Haley and her mother deny any further questions or needs at this time.  Do you have any pain since discharge?  Yes  Where: Right knee   Rating on pain scale 1-10, 10 being the worst pain you have ever experienced, what is current pain: 6-7/10  Alleviating factors: pain medication and icing 20 minutes on, 20 minutes off, with a cloth between ice pack and skin  Aggravating factors: movement  Is the pain manageable at home? Yes  How well was your pain managed while in the hospital?   On a scale  of 1-5   1- Very Poor and 5- Very well   Very Well  When you were leaving the hospital were your discharge instructions reviewed with you? Yes  How well were your discharge instructions explained to you?   On a scale of 1-5   1- Very Poor and 5- Very well   Very Well  Do you have any questions about your discharge instructions?  No  Before leaving the hospital was your diagnoses explained to you? Yes  Do you have any questions about your diagnoses? No  Are you able to perform normal daily activities of living as you have prior to your hospital stay (dressing, bathing, ambulating to the bathroom, etc)? yes  (NCM) Was patient given a different diet per AVS? no    Medications:   Current Outpatient Medications   Medication Sig Dispense Refill    HYDROcodone-acetaminophen 7.5-325 MG Oral Tab Take 1 tablet by mouth every 6 (six) hours as needed. Hold or driving on this med.  Stop if lethargic or hallucinating.  Maximum 4000 mg Tylenol/acetaminophen daily from all sources.  Each Norco has 325 mg of Tylenol/acetaminophen in it. 25 tablet 0    docusate sodium 100 MG Oral Cap Take 100 mg by mouth 2 (two) times daily. 20 capsule 0    ferrous sulfate 325 (65 FE) MG Oral Tab EC Take 1 tablet (325 mg total) by mouth daily with breakfast. 20 tablet 0    rivaroxaban (XARELTO) 20 MG Oral Tab Take 1 tablet (20 mg total) by mouth daily with food. 90 tablet 1    traMADol 50 MG Oral Tab Take 1 tablet (50 mg total) by mouth every 6 (six) hours as needed for Pain.      DULoxetine 30 MG Oral Cap DR Particles 2 capsules (60 mg total) daily.      losartan 25 MG Oral Tab Take 1 tablet (25 mg total) by mouth daily.      metoprolol succinate ER 25 MG Oral Tablet 24 Hr Take 1 tablet (25 mg total) by mouth daily. 30 tablet 2    acetaminophen 500 MG Oral Tab Take 1 tablet (500 mg total) by mouth every 6 (six) hours as needed for Fever or Pain (equal to or greater than 100.4). 1 tablet 0    amiodarone 200 MG Oral Tab Take 0.5 tablets (100 mg total)  by mouth daily. 90 tablet 3    pantoprazole 40 MG Oral Tab EC Take 1 tablet (40 mg total) by mouth before breakfast. 90 tablet 3    atorvastatin 10 MG Oral Tab Take 1 tablet (10 mg total) by mouth nightly. 90 tablet 3     Were there any changes to your current medication(s) noted on the AVS? No  If a new medication was prescribed:    Was the new medication's purpose & side effects reviewed? Yes  Do you have any questions about your new medication? No  Did you  your discharge medications when you left the hospital? Yes  Let's go over your medications together to make sure we are not missing anything. Medications Reviewed  Are there any reasons that keep you from taking your medication as prescribed? No  Are you having any concerns with constipation? No, patient reports she has not had a BM as yet, however, she does not feel uncomfortable or constipated.  Did patient receive their flu shot (Sept-March)? No    Discharge medications reviewed/discussed/and reconciled against outpatient medications with patient.  Any changes or updates to medications sent to PCP.  Patient Acknowledged     Referrals/orders at D/C:  Referrals/orders placed at D/C? yes  What services:   Home health, PT, and OT   (If HH was ordered) Has HH been set up?  Yes, start of care was 3/11/2024    If Yes: With Whom: [facility]      DME ordered at D/C? No, patient has a walker at home      Discharge orders, AVS reviewed and discussed with patient. Any changes or updates to orders sent to PCP.  Patient Acknowledged    SDOH:   Transportation:    Transportation Needs: No Transportation Needs (3/8/2024)    Transportation Needs     Lack of Transportation: No       Financial Strain:    Financial Resource Strain: Low Risk  (3/12/2024)    Financial Resource Strain     Difficulty of Paying Living Expenses: Not on file     Med Affordability: No       Diagnosis specifics:   Keep incision dry for 2 weeks.   Change Mepilex dressing every 3-5 days.    Ambulate with walker and assist.      Follow up appointments:    TCC  Was TCC ordered: No      PCP (If no TCC appointment)  Does patient already have a PCP appointment scheduled? No  NCM Scheduled PCP office TCM appointment with patient on 3/21/2024 at 10:40 am.       Specialist    Does the patient have any other follow up appointment(s) needing to be scheduled? No  Does the patient need assistance scheduling appointment(s): No  Haley states her mother has a HFU scheduled with ortho, she does not recall the date at this time.    Is there any reason as to why you cannot make your appointment(s)?  No     Needs post D/C:   Now that you are home, are there any needs or concerns you need addressed before your next visit with your PCP?  (DME, meds, questions, etc.): No    Interventions by NCM:   NCM reviewed medications, discharge instructions, S&S of infection/blood clots. Haley patient's daughter instructed to report any new or worsening symptoms, when to call the doctor and when to call 911. Haley verbalized understanding of these. NCM instructed Haley to call her mother's PCP with any questions or needs, she states she will.      CCM referral placed:    No, not at this time.    BOOK BY DATE: 3/24/2024

## 2024-03-18 ENCOUNTER — HOME HEALTH CHARGES (OUTPATIENT)
Dept: INTERNAL MEDICINE CLINIC | Facility: CLINIC | Age: 69
End: 2024-03-18

## 2024-03-18 DIAGNOSIS — Z96.651 PRESENCE OF RIGHT ARTIFICIAL KNEE JOINT: Primary | ICD-10-CM

## 2024-03-18 DIAGNOSIS — E78.2 MIXED HYPERLIPIDEMIA: ICD-10-CM

## 2024-03-18 DIAGNOSIS — E03.9 ACQUIRED HYPOTHYROIDISM: ICD-10-CM

## 2024-03-18 DIAGNOSIS — F41.9 ANXIETY: ICD-10-CM

## 2024-03-18 DIAGNOSIS — M79.7 FIBROMYALGIA: ICD-10-CM

## 2024-03-18 DIAGNOSIS — Z47.1 AFTERCARE FOLLOWING RIGHT KNEE JOINT REPLACEMENT SURGERY: ICD-10-CM

## 2024-03-18 DIAGNOSIS — Z96.651 AFTERCARE FOLLOWING RIGHT KNEE JOINT REPLACEMENT SURGERY: ICD-10-CM

## 2024-03-18 DIAGNOSIS — I10 ESSENTIAL HYPERTENSION: ICD-10-CM

## 2024-03-18 NOTE — PROGRESS NOTES
This is the initial home health certification.  [facility]  Dates of service are for 3/11/2024 - 5/9/2024.  See scanned reports plan of care.

## 2024-03-20 ENCOUNTER — TELEPHONE (OUTPATIENT)
Dept: ORTHOPEDICS CLINIC | Facility: CLINIC | Age: 69
End: 2024-03-20

## 2024-03-20 NOTE — TELEPHONE ENCOUNTER
Yuliya from [facility] discharge from Home physical therapy tomorrow and needs an order for outpatient.Please advise

## 2024-03-20 NOTE — TELEPHONE ENCOUNTER
Called Yuliya and advised [facility] and advised she call ortho specialist private office for orders. She verbalized understanding.

## 2024-03-21 ENCOUNTER — OFFICE VISIT (OUTPATIENT)
Dept: INTERNAL MEDICINE CLINIC | Facility: CLINIC | Age: 69
End: 2024-03-21
Payer: MEDICARE

## 2024-03-21 VITALS
RESPIRATION RATE: 18 BRPM | DIASTOLIC BLOOD PRESSURE: 65 MMHG | HEIGHT: 68 IN | BODY MASS INDEX: 25.91 KG/M2 | SYSTOLIC BLOOD PRESSURE: 115 MMHG | WEIGHT: 171 LBS | HEART RATE: 70 BPM

## 2024-03-21 DIAGNOSIS — D50.9 IRON DEFICIENCY ANEMIA, UNSPECIFIED IRON DEFICIENCY ANEMIA TYPE: ICD-10-CM

## 2024-03-21 DIAGNOSIS — R74.8 ELEVATED LIVER ENZYMES: ICD-10-CM

## 2024-03-21 DIAGNOSIS — D69.6 THROMBOCYTOPENIA (HCC): ICD-10-CM

## 2024-03-21 DIAGNOSIS — M17.11 PRIMARY OSTEOARTHRITIS OF RIGHT KNEE: Primary | ICD-10-CM

## 2024-03-21 DIAGNOSIS — K59.03 DRUG-INDUCED CONSTIPATION: ICD-10-CM

## 2024-03-21 DIAGNOSIS — M79.89 RIGHT LEG SWELLING: ICD-10-CM

## 2024-03-21 DIAGNOSIS — M79.604 RIGHT LEG PAIN: ICD-10-CM

## 2024-03-21 PROCEDURE — 99495 TRANSJ CARE MGMT MOD F2F 14D: CPT | Performed by: INTERNAL MEDICINE

## 2024-03-21 RX ORDER — TRAMADOL HYDROCHLORIDE 50 MG/1
50 TABLET ORAL DAILY PRN
Qty: 20 TABLET | Refills: 0 | Status: SHIPPED | OUTPATIENT
Start: 2024-03-21

## 2024-03-21 RX ORDER — PSEUDOEPHEDRINE HCL 30 MG
100 TABLET ORAL 2 TIMES DAILY
Qty: 20 CAPSULE | Refills: 0 | Status: SHIPPED | OUTPATIENT
Start: 2024-03-21

## 2024-03-21 NOTE — PROGRESS NOTES
Subjective:   Brittnee Álvarez is a 68 year old female who presents for hospital follow up.   She was discharged from [facility] to Home Health Care Services  Admission Date: 3/8/24   Discharge Date: 3/10/24  Hospital Discharge Diagnosis: OA right knee     Interactive contact within 2 business days post discharge first initiated on Date: 3/12/2024    During the visit, the following was completed:  Obtained and reviewed discharge summary, continuity of care documents, and Hospitalist notes  Reviewed Labs (CBC, CMP)    HPI: status post right total knee arthroplasty   Today patient comes with her daughter and she is doing okay but  Takes the Norco up to twice a day and takes tramadol in between  But now her right hip hurts  Today might be the last PT at home but pt and family agrees that she may be improved with 1 more week of physical therapy at least at home    5/2023   Saw chiropractor and has issues with the elbows   She is not taking the trazodone as she did not feel like it was helping  She hasnt been taking the thyroid meds wither , not taking the amiodarone bc she ran out   Last saw cards one yr ago   No celobrex , taking metoprolol only once a day prn bc she feels it makes her HR go low   Takes the buspirone when she remembers   She is not using the xanax  Will see rheum June 26th   Will be seeing Dr. Margaux colindres  coming up        HISTORY   bilateral thumb pains and would like Celebrex 200 mg that she is taking in the past and did see her cardiologist recently when was recommended that it is okay for her to take it as long as she takes it sparingly as she is on the Xarelto  C/o coughing all day --still with a chronic cough, saw her GI doctor and had EGD and was put on pantoprazole 40 mg for acute gastritis-will scan the report  She has been taking it for a couple of months but no help               ---------------------------------------------------------------------------------------------  History  6/2020   new pt --here with her daughter rere - PSR   C/c establish care   C/o will go for left  knee surg July 17th with dr freeman   Any little thing stresses her out   Used to see cardiologist at [facility] and will see dr acevedo   Then saw cardioogist at [facility]  Anxiety -especially at night and has a hard time sleeping --takes it less than once a mn  A fib -on  anticoagulation, Xarelto, sees cardiology-  htn   HL   OA knees   Vit d def   History of blood clot to the right leg status post anticoagulation  History of DVT July 2020 after surgery  Migraines per pt       luke jansen in [facility]      -----------------------------------------------------------------------           History/Other:   Current Medications:  Medication Reconciliation:  I am aware of an inpatient discharge within the last 30 days.  The discharge medication list has been reconciled with the patient's current medication list and reviewed by me.  See medication list for additions of new medication, and changes to current doses of medications and discontinued medications.  Outpatient Medications Marked as Taking for the 3/21/24 encounter (Office Visit) with Rafaela Mccord MD   Medication Sig    traMADol 50 MG Oral Tab Take 1 tablet (50 mg total) by mouth daily as needed for Pain.    docusate sodium 100 MG Oral Cap Take 100 mg by mouth 2 (two) times daily.    HYDROcodone-acetaminophen 7.5-325 MG Oral Tab Take 1 tablet by mouth every 6 (six) hours as needed. Hold or driving on this med.  Stop if lethargic or hallucinating.  Maximum 4000 mg Tylenol/acetaminophen daily from all sources.  Each Norco has 325 mg of Tylenol/acetaminophen in it.    ferrous sulfate 325 (65 FE) MG Oral Tab EC Take 1 tablet (325 mg total) by mouth daily with breakfast.    rivaroxaban (XARELTO) 20 MG Oral Tab Take 1 tablet (20 mg total) by  mouth daily with food.    DULoxetine 30 MG Oral Cap DR Particles 2 capsules (60 mg total) daily.    losartan 25 MG Oral Tab Take 1 tablet (25 mg total) by mouth daily.    metoprolol succinate ER 25 MG Oral Tablet 24 Hr Take 1 tablet (25 mg total) by mouth daily.    amiodarone 200 MG Oral Tab Take 0.5 tablets (100 mg total) by mouth daily.    pantoprazole 40 MG Oral Tab EC Take 1 tablet (40 mg total) by mouth before breakfast.    atorvastatin 10 MG Oral Tab Take 1 tablet (10 mg total) by mouth nightly.       Review of Systems  GENERAL: feels well otherwise  SKIN: denies any unusual skin lesions  EYES: denies blurred vision or double vision  HEENT: denies nasal congestion, sinus pain or ST  LUNGS: denies shortness of breath with exertion  CARDIOVASCULAR: denies chest pain on exertion  GI: denies abdominal pain, denies heartburn  : denies dysuria, vaginal discharge or itching, no complaint of urinary incontinence   MUSCULOSKELETAL:+  back pain  NEURO: denies headaches  PSYCHE: denies depression or anxiety  HEMATOLOGIC:+ hx of anemia  ENDOCRINE: denies thyroid history  ALL/ASTHMA: denies hx of allergy or asthma    Objective:   Physical Exam  GENERAL: well developed, well nourished,in no apparent distress  SKIN: no rashes,no suspicious lesions  HEENT: atraumatic, normocephalic  NECK: supple,no adenopathy  LUNGS: clear to auscultation, no wheeze  CARDIO: RRR without murmur  GI: good BS's,no masses or tenderness  EXTREMITIES: no cyanosis, or edema  Well-healing incision of the right knee and noted that there is swelling and hematoma extending from the thigh to the mid calf but the calf itself is soft, noted there is some varicosities as well      /65 (BP Location: Right arm, Patient Position: Sitting, Cuff Size: adult)   Pulse 70   Resp 18   Ht 5' 8\" (1.727 m)   Wt 171 lb (77.6 kg)   BMI 26.00 kg/m²  Estimated body mass index is 26 kg/m² as calculated from the following:    Height as of this encounter: 5' 8\"  (1.727 m).    Weight as of this encounter: 171 lb (77.6 kg).    Assessment & Plan:   1. Primary osteoarthritis of right knee (Primary)  -     traMADol HCl; Take 1 tablet (50 mg total) by mouth daily as needed for Pain.  Dispense: 20 tablet; Refill: 0  Doing well, cont PT ,may benefit from more home PT - they will d/w the therapist   Advised to wean off pain meds   2. Drug-induced constipation  -     DSS; Take 100 mg by mouth 2 (two) times daily.  Dispense: 20 capsule; Refill: 0  Refilled the stool softener -advised to take daily to avoid further as she is on pain meds as well as iron both of which can be constipatiing   3. Elevated liver enzymes  -     Comp Metabolic Panel (14); Future; Expected date: 03/21/2024  Monitor   4. Iron deficiency anemia, unspecified iron deficiency anemia type  -     CBC With Differential With Platelet; Future; Expected date: 03/21/2024  And   5. Thrombocytopenia (HCC)  -     CBC With Differential With Platelet; Future; Expected date: 03/21/2024  Recheck   6. Right leg pain  -     US VENOUS DOPPLER LEG RIGHT - DIAG IMG (CPT=93971); Future; Expected date: 03/21/2024  And   7. Right leg swelling  -     US VENOUS DOPPLER LEG RIGHT - DIAG IMG (CPT=93971); Future; Expected date: 03/21/2024  Reassured patient, noted that the calf is soft and she is on blood thinners, most likely from the surgery and resolving hematoma        Return in 3 months (on 6/21/2024).

## 2024-03-28 DIAGNOSIS — I48.91 ATRIAL FIBRILLATION, UNSPECIFIED TYPE (HCC): ICD-10-CM

## 2024-04-01 RX ORDER — ATORVASTATIN CALCIUM 10 MG/1
10 TABLET, FILM COATED ORAL NIGHTLY
Qty: 90 TABLET | Refills: 3 | Status: SHIPPED | OUTPATIENT
Start: 2024-04-01

## 2024-04-01 NOTE — TELEPHONE ENCOUNTER
Please review; protocol failed/No Protocol    LOV: 03/21/2024    No Active/Future Lipid panel labs pended.     Requested Prescriptions   Pending Prescriptions Disp Refills    ATORVASTATIN 10 MG Oral Tab [Pharmacy Med Name: ATORVASTATIN 10MG TABLETS] 90 tablet 3     Sig: TAKE 1 TABLET(10 MG) BY MOUTH EVERY NIGHT       Cholesterol Medication Protocol Failed - 3/28/2024  5:40 AM        Failed - Lipid panel within past 12 months     Lab Results   Component Value Date    CHOLEST 179 07/02/2022    TRIG 66 07/02/2022    HDL 82 (H) 07/02/2022    LDL 84 07/02/2022    VLDL 10 07/02/2022    NONHDLC 97 07/02/2022             Passed - ALT < 80     Lab Results   Component Value Date    ALT 50 (H) 02/29/2024             Passed - ALT resulted within past year        Passed - In person appointment or virtual visit in the past 12 mos or appointment in next 3 mos     Recent Outpatient Visits              1 week [facility]    [facility]    1 month ago [facility] exam for internal medicine    [facility]    Office Visit    8 months ago [facility]oxys[facility]    [facility]    10 months ago Multiple joint pain    [facility] Rafaela Christiansen MD    Office Visit    1 year ago [facility] of urination    [facility]    Office Visit                           Recent Outpatient Visits              1 week ago [facility] oste[facility] of right knee    [facility]    [facility]    1 month ago [facility] for internal medicine    [facility]    Office Visit    8  months ago Paroxysmal atrial fibrillation (HCC)    [facility] [place] Rafaela Mccord MD    Office Visit    10 months ago [facility]    [facility]    1 year ago Frequency of urination    [facility] Saray Caceres PA-C    Office Visit

## 2024-04-24 ENCOUNTER — TELEPHONE (OUTPATIENT)
Dept: INTERNAL MEDICINE CLINIC | Facility: CLINIC | Age: 69
End: 2024-04-24

## 2024-04-24 NOTE — TELEPHONE ENCOUNTER
MyChart message sent. Please note care gap initial contacts are usually done with [employer] or the office staff. Call Center will handle second attempts if needed.

## 2024-07-13 ENCOUNTER — OFFICE VISIT (OUTPATIENT)
Dept: INTERNAL MEDICINE CLINIC | Facility: CLINIC | Age: 69
End: 2024-07-13

## 2024-07-13 VITALS
BODY MASS INDEX: 26.37 KG/M2 | TEMPERATURE: 98 F | WEIGHT: 174 LBS | OXYGEN SATURATION: 99 % | HEART RATE: 58 BPM | HEIGHT: 68 IN | DIASTOLIC BLOOD PRESSURE: 69 MMHG | RESPIRATION RATE: 18 BRPM | SYSTOLIC BLOOD PRESSURE: 116 MMHG

## 2024-07-13 DIAGNOSIS — R35.1 NOCTURIA: ICD-10-CM

## 2024-07-13 DIAGNOSIS — G47.00 INSOMNIA, UNSPECIFIED TYPE: Primary | ICD-10-CM

## 2024-07-13 DIAGNOSIS — R06.83 SNORING: ICD-10-CM

## 2024-07-13 DIAGNOSIS — G25.81 RESTLESS LEGS: ICD-10-CM

## 2024-07-13 PROCEDURE — 99214 OFFICE O/P EST MOD 30 MIN: CPT

## 2024-07-13 RX ORDER — DULOXETIN HYDROCHLORIDE 60 MG/1
60 CAPSULE, DELAYED RELEASE ORAL DAILY
COMMUNITY

## 2024-07-13 NOTE — PROGRESS NOTES
Subjective:   Brittnee Álvarez is a 69 year old female who presents for Sleep Problem     Presents with her daughter for c/c sleep onset and sleep maintenance insomnia for many years. She reads or watches tv at night until she feels sleepy, but then gets into bed and does not feel sleepy anymore. Finally sleeps around 4am and then is awake to go to the bathroom - reports voiding 10 times during the night. She does snore, but her  does not hear well so he is not able to tell her if she snores loudly or has apnea or choking/gasping in her sleep. She does report having woken herself up snoring before. She also feels restlessness and pain in her legs and moves them around a lot at night.   She was prescribed ambien in the past but did not like it, then tried buspirone which did not work, then was given alprazolam which she would take about once a month. Also reports having a lot of migraines recently, likely related to poor sleep.   She drinks decaf coffee in the morning. No tea, occasional soda, no other caffeine sources. Only occasionally drinks alcohol.   She has never had a sleep study.    History/Other:    Chief Complaint Reviewed and Verified  No Further Nursing Notes to   Review  Tobacco Reviewed  Allergies Reviewed  Medications Reviewed    Problem List Reviewed  Medical History Reviewed  Surgical History   Reviewed  OB Status Reviewed  Family History Reviewed         Tobacco:  She has never smoked tobacco.    Current Outpatient Medications   Medication Sig Dispense Refill    DULoxetine 60 MG Oral Cap DR Particles Take 1 capsule (60 mg total) by mouth daily.      atorvastatin 10 MG Oral Tab Take 1 tablet (10 mg total) by mouth nightly. 90 tablet 3    rivaroxaban (XARELTO) 20 MG Oral Tab Take 1 tablet (20 mg total) by mouth daily with food. 90 tablet 1    losartan 25 MG Oral Tab Take 1 tablet (25 mg total) by mouth daily.      metoprolol succinate ER 25 MG Oral Tablet 24 Hr Take 1 tablet (25  mg total) by mouth daily. 30 tablet 2    amiodarone 200 MG Oral Tab Take 0.5 tablets (100 mg total) by mouth daily. 90 tablet 3     Review of Systems:  Review of Systems  10 point review of systems otherwise negative with the exception of HPI and assessment and plan    Objective:   /69 (BP Location: Left arm, Patient Position: Sitting, Cuff Size: large)   Pulse 58   Temp 98 °F (36.7 °C)   Resp 18   Ht 5' 8\" (1.727 m)   Wt 174 lb (78.9 kg)   SpO2 99%   BMI 26.46 kg/m²  Estimated body mass index is 26.46 kg/m² as calculated from the following:    Height as of this encounter: 5' 8\" (1.727 m).    Weight as of this encounter: 174 lb (78.9 kg).  Physical Exam  Vitals reviewed.   Constitutional:       General: She is not in acute distress.     Appearance: Normal appearance. She is well-developed.   HENT:      Mouth/Throat:      Mouth: Mucous membranes are moist.      Pharynx: Oropharynx is clear.   Cardiovascular:      Rate and Rhythm: Normal rate and regular rhythm.      Heart sounds: Normal heart sounds.   Pulmonary:      Effort: Pulmonary effort is normal.      Breath sounds: Normal breath sounds.   Abdominal:      General: Bowel sounds are normal.      Palpations: Abdomen is soft.   Lymphadenopathy:      Cervical: No cervical adenopathy.   Skin:     General: Skin is warm and dry.   Neurological:      Mental Status: She is alert and oriented to person, place, and time.       Assessment & Plan:   1. Insomnia, unspecified type (Primary)  -     Diagnostic Sleep Study  -     General sleep study; Future; Expected date: 08/13/2024  -     Pulmonary Referral - In Network  2. Snoring  -     Diagnostic Sleep Study  -     General sleep study; Future; Expected date: 08/13/2024  -     Pulmonary Referral - In Network  3. Restless legs  -     Diagnostic Sleep Study  -     General sleep study; Future; Expected date: 08/13/2024  -     Pulmonary Referral - In Network  4. Nocturia  -     Diagnostic Sleep Study  -     General  sleep study; Future; Expected date: 08/13/2024  -     Pulmonary Referral - In Network  Patient requesting medication, but discussed need to start with sleep study as she has never had one, and determine treatment based on results     MIHAI Serrato, 7/13/2024, 11:02 AM

## 2024-07-23 ENCOUNTER — OFFICE VISIT (OUTPATIENT)
Dept: SLEEP CENTER | Age: 69
End: 2024-07-23
Payer: MEDICARE

## 2024-07-23 DIAGNOSIS — R35.1 NOCTURIA: ICD-10-CM

## 2024-07-23 DIAGNOSIS — G47.00 INSOMNIA, UNSPECIFIED TYPE: ICD-10-CM

## 2024-07-23 DIAGNOSIS — R06.83 SNORING: ICD-10-CM

## 2024-07-23 DIAGNOSIS — Z76.89 SLEEP CONCERN: Primary | ICD-10-CM

## 2024-07-23 DIAGNOSIS — G25.81 RESTLESS LEGS: ICD-10-CM

## 2024-07-23 PROCEDURE — 95810 POLYSOM 6/> YRS 4/> PARAM: CPT

## 2024-07-25 DIAGNOSIS — D64.9 ANEMIA, UNSPECIFIED TYPE: ICD-10-CM

## 2024-07-25 DIAGNOSIS — G47.33 OBSTRUCTIVE SLEEP APNEA: Primary | ICD-10-CM

## 2024-07-25 DIAGNOSIS — G47.61 PERIODIC LIMB MOVEMENTS OF SLEEP: ICD-10-CM

## 2024-07-29 NOTE — TELEPHONE ENCOUNTER
Current Outpatient Medications:       rivaroxaban (XARELTO) 20 MG Oral Tab, Take 1 tablet (20 mg total) by mouth daily with food., Disp: 90 tablet, Rfl: 1   69 Muscle Hinge Flap Text: The defect edges were debeveled with a #15 scalpel blade.  Given the size, depth and location of the defect and the proximity to free margins a muscle hinge flap was deemed most appropriate.  Using a sterile surgical marker, an appropriate hinge flap was drawn incorporating the defect. The area thus outlined was incised with a #15 scalpel blade.  The skin margins were undermined to an appropriate distance in all directions utilizing iris scissors.

## 2024-08-01 ENCOUNTER — TELEPHONE (OUTPATIENT)
Dept: INTERNAL MEDICINE CLINIC | Facility: CLINIC | Age: 69
End: 2024-08-01

## 2024-08-01 NOTE — TELEPHONE ENCOUNTER
Patient called said she will be getting a sleep study for sleep apnea and was advised to ask for medication to help her sleep for this study.       [id number] - [facility] [place] -   [address] AT [facility]

## 2024-08-01 NOTE — TELEPHONE ENCOUNTER
Per Haley patient has done a sleep study before and only slept 1.5 hours. The sleep tech advised at this time patient should have medication to help her sleep. Has taken in Ambien in the past but did not like it. Valium has worked well. Please advise     Verified preferred pharmacy and patient allergies.

## 2024-08-02 NOTE — TELEPHONE ENCOUNTER
Sedatives are usually not allowed since they can alter results but per upto date   Chronic, stable doses of opioids and chronic respiratory problems do not preclude testing.   Reviewed note , is the mult times getting up to urinate cuase of poor sleep ?   Noted she was referred to dr araiza -- would have her f/u there

## 2024-08-02 NOTE — TELEPHONE ENCOUNTER
Dr Mccord - JENIFFER only - daughter explains that patient does not wake up to urinate; rather, since she is already awake, she uses bathroom. Daughter will contact Dr Marquez's office for guidance.       RN called patient's daughter on verbal release   RN informed of provider's message as detailed .   She verbalizes understanding of all information, and agreeable to plan.   She will contact Dr Marquez's office

## 2024-08-26 NOTE — TELEPHONE ENCOUNTER
Please Review. Protocol Failed; No Protocol     Requested Prescriptions   Pending Prescriptions Disp Refills    XARELTO 20 MG Oral Tab [Pharmacy Med Name: XARELTO 20MG TABLETS] 90 tablet 1     Sig: TAKE 1 TABLET(20 MG) BY MOUTH DAILY WITH FOOD       There is no refill protocol information for this order              Recent Outpatient Visits              1 month ago Sleep concern    [facility]    Office Visit    1 month ago Insomnia, unspecified type    [facility] [place]    Office Visit    5 months ago Primary osteoarthritis of right knee    [facility]    Office Visit    5 months ago Preop exam for internal medicine    [facility]    Office Visit    1 year ago Paroxysmal atrial fibrillation (HCC)    [facility]    Office Visit

## 2024-10-04 NOTE — TELEPHONE ENCOUNTER
[facility]    Intensive Outpatient Program (IOP) Group Therapy Progress Notes  Program: Intensive Outpatient Program      Group 1:    Date of Group10/4/2024    Start Time: 8:35am  End Time:9:06am    Number of Participants: 6    Focus/Topic of Group Activity: Check in    Summary of Group Session: Pt checked in and  shared activities and encounters with stressors , use of coping and struggling with coping . Pt also identified goal for today.    Therapeutic Intervention: Empathic listening, Cognitive Behavioral Therapy (CBT), Psycho education,     Patient's Response to Intervention:  Pt was attentive and actively engaged in session. Patient completed check in form and form was reviewed by therapist.  Patient discussed getting a lot of schoolwork done yesterday.  Patient discussed talking to the police about her stalkers and how the police told her  to reach out to them again on Monday    Mental Status Exam:   Appearance: Appropriately dressed and groomed  Mood: euthymic  Affect: Full  Behavior: Pleasant, Cooperative, and Interactive; Alert  Speech: Appropriate and Clear  Cognition: Intact and Oriented X4    Progress Towards Goal(s):  Minimal    Additional Comments:  n/a    Next Step:   Continue with Current Services     _________________________________________________________________________________________________________________________________      Group 2:    Date of Group: 10/4/2024    Start Time: 9:19 am End Time: 9:38am    Number of Participants: 6    Focus/Topic of Group Activity: Life management .        Summary of Group : Group members discussed how well they manage their life  and if there are any barriers that are affecting their ability to have more of a balanced life.  Therapeutic Intervention: Empathic listening, Cognitive Behavioral Therapy (CBT)    Patient's Response to Intervention:  no comment.    Mental Status Exam:  Appearance: Appropriately dressed and groomed  Mood:  Prior authorization for Xarelto completed w/ [employer] on cover my meds Key: [id number], turn around time 3-5 days.

## 2024-12-03 ENCOUNTER — TELEPHONE (OUTPATIENT)
Dept: INTERNAL MEDICINE CLINIC | Facility: CLINIC | Age: 69
End: 2024-12-03

## 2024-12-11 ENCOUNTER — HOSPITAL ENCOUNTER (OUTPATIENT)
Age: 69
Discharge: HOME | End: 2024-12-11
Payer: MEDICARE

## 2024-12-11 VITALS
RESPIRATION RATE: 16 BRPM | OXYGEN SATURATION: 97 % | HEART RATE: 83 BPM | TEMPERATURE: 98 F | SYSTOLIC BLOOD PRESSURE: 153 MMHG | DIASTOLIC BLOOD PRESSURE: 80 MMHG

## 2024-12-11 DIAGNOSIS — R30.0 DYSURIA: Primary | ICD-10-CM

## 2024-12-11 LAB
BILIRUB UR QL STRIP: NEGATIVE
CLARITY UR: CLEAR
COLOR UR: YELLOW
GLUCOSE UR STRIP-MCNC: NEGATIVE MG/DL
KETONES UR STRIP-MCNC: NEGATIVE MG/DL
NITRITE UR QL STRIP: NEGATIVE
PH UR STRIP: 7 [PH]
SP GR UR STRIP: 1.01
UROBILINOGEN UR STRIP-ACNC: <2 MG/DL

## 2024-12-11 PROCEDURE — 81002 URINALYSIS NONAUTO W/O SCOPE: CPT | Performed by: NURSE PRACTITIONER

## 2024-12-11 PROCEDURE — 99213 OFFICE O/P EST LOW 20 MIN: CPT | Performed by: NURSE PRACTITIONER

## 2024-12-11 RX ORDER — CEFPODOXIME PROXETIL 200 MG/1
200 TABLET, FILM COATED ORAL 2 TIMES DAILY
Qty: 20 TABLET | Refills: 0 | Status: SHIPPED | OUTPATIENT
Start: 2024-12-11 | End: 2024-12-21

## 2024-12-11 NOTE — ED PROVIDER NOTES
Patient Seen in: [facility]    History   CC: \"I have a urine infection\"   HPI: Brittnee Álvarez 69 year old female  who presents c/o burning dysuria, urinary frequency, urgency, hesitancy x2-3 days. +chills and myalgias today. Denies hematuria. Denies abd pain. +nausea without vomiting. Denies diarrhea. Denies rash, vaginal dx.    Past Medical History:    Anesthesia complication    Anxiety    Arthritis    Atrial fibrillation (HCC)    Back problem    Colitis    Deep vein thrombosis (HCC)    right leg    Disorder of thyroid    Essential hypertension    Fibromyalgia    Hyperlipidemia    Hypothyroidism    Migraines    Osteoarthritis    PONV (postoperative nausea and vomiting)    Stroke (HCC)    TIA    Visual impairment    glasses       Past Surgical History:   Procedure Laterality Date    Appendectomy             section x 3     Hemorrhoidectomy      Knee replacement surgery  2020    Other surgical history  1986    Fibroid    Other surgical history      breast lump    Total knee replacement Left            Family History   Problem Relation Age of Onset    Heart Disorder Father     Cancer Mother         ?liver        Social History     Socioeconomic History    Marital status:    Tobacco Use    Smoking status: Never    Smokeless tobacco: Never   Vaping Use    Vaping status: Never Used   Substance and Sexual Activity    Alcohol use: Not Currently    Drug use: Never    Sexual activity: Not Currently     Social Drivers of Health     Financial Resource Strain: Low Risk  (3/12/2024)    Financial Resource Strain     Med Affordability: No   Food Insecurity: No Food Insecurity (3/8/2024)    Food Insecurity     Food Insecurity: Never true   Transportation Needs: No Transportation Needs (3/8/2024)    Transportation Needs     Lack of Transportation: No   Housing Stability: Low Risk  (3/8/2024)    Housing Stability     Housing Instability: No       ROS:  Systems reviewed: All pertinent  positives noted in HPI. Unless otherwise noted, additional systems reviewed are negative.   Vital signs reviewed.    Positive for stated complaint: Urinary Symptoms - Possible uti  Other systems are as noted in HPI.  Constitutional and vital signs reviewed.      All other systems reviewed and negative except as noted above.    [facility] reviewed from today and agreed except as otherwise stated in HPI.             Constitutional and vital signs reviewed.        Physical Exam     ED Triage Vitals [12/11/24 1737]   /80   Pulse 83   Resp 16   Temp 98.4 °F (36.9 °C)   Temp src Oral   SpO2 97 %   O2 Device None (Room air)       Current:/80   Pulse 83   Temp 98.4 °F (36.9 °C) (Oral)   Resp 16   SpO2 97%         PE:  General - Appears well, non-toxic and in NAD  Head - Appears symmetrical without deformity/swelling cranium, scalp, or facial bones  GI - Appears round and flat, BS +x4 quadrants, no tenderness/guarding with palpation  Skin - no rashes or petechiae noted, pink warm and dry throughout, mmm, cap refill <2seconds  Neuro - A&O x4, steady gait  MSK - makes purposeful movements of all extremities.  Psych - Interactive and appropriate      ED Course     Labs Reviewed   [facility] POCT URINALYSIS DIPSTICK - Abnormal; Notable for the following components:       Result Value    Protein urine Trace (*)     Blood, Urine Trace-lysed (*)     Leukocyte esterase urine Moderate (*)     All other components within normal limits   URINE CULTURE, ROUTINE       MDM     DDx: Cystitis, pyelonephritis, obstructive uropathy    Urine dip with blood, leukocyte Estrace and protein.  Dedicated urine culture pending.  We will treat however with cefpodoxime due to patient's chills and myalgias.  Unlikely dedicated CVA tenderness however difficulty with assessment as patient has chronic back pain.  Advised follow-up and strict return/ED precautions reviewed.  Rest and hydration instructions reviewed.  Patient is historian and  pt/daughter who accompanies demonstrates understanding of all instruction and agrees with plan of care.      Disposition and Plan     Clinical Impression:  1. Dysuria        Disposition:  Discharge    Follow-up:  Rafaela Mccord MD  [address] [place] [id number] [phone]    Go in 1 week  As needed      Medications Prescribed:  Current Discharge Medication List        START taking these medications    Details   cefpodoxime 200 MG Oral Tab Take 1 tablet (200 mg total) by mouth 2 (two) times daily for 10 days.  Qty: 20 tablet, Refills: 0

## 2024-12-16 ENCOUNTER — OFFICE VISIT (OUTPATIENT)
Dept: INTERNAL MEDICINE CLINIC | Facility: CLINIC | Age: 69
End: 2024-12-16

## 2024-12-16 VITALS
SYSTOLIC BLOOD PRESSURE: 124 MMHG | BODY MASS INDEX: 26.98 KG/M2 | WEIGHT: 178 LBS | HEART RATE: 62 BPM | RESPIRATION RATE: 16 BRPM | HEIGHT: 68 IN | DIASTOLIC BLOOD PRESSURE: 76 MMHG

## 2024-12-16 DIAGNOSIS — R39.15 URINARY URGENCY: Primary | ICD-10-CM

## 2024-12-16 PROCEDURE — 99213 OFFICE O/P EST LOW 20 MIN: CPT | Performed by: NURSE PRACTITIONER

## 2024-12-17 NOTE — PROGRESS NOTES
Brittnee Álvarez is a 69 year old female.  Chief Complaint   Patient presents with    Urgent Care F/u     Still having a few symptoms still having pressure feeling      HPI:   She presents for urgent care follow up. She went to the  due to having dysuria, urinary urgency and urinary frequency.      She was prescribed cefpodoxime 200mg BID X 10 days. She stopped the antibiotics on Monday 12/14. Urine culture showed no growth.     She has had symptoms in the past of urgency and urinary incontinence. She feels she is not emptying her bladder when urinating. She had an US of the kidney and bladder on 3/25/2023 which was negative.     She does have intermittent constipation. She did have a bowel movement today.     No fevers.   Current Outpatient Medications   Medication Sig Dispense Refill    rivaroxaban (XARELTO) 20 MG Oral Tab Take 1 tablet (20 mg total) by mouth daily with food. 90 tablet 3    DULoxetine 60 MG Oral Cap DR Particles Take 1 capsule (60 mg total) by mouth daily.      atorvastatin 10 MG Oral Tab Take 1 tablet (10 mg total) by mouth nightly. 90 tablet 3    losartan 25 MG Oral Tab Take 1 tablet (25 mg total) by mouth daily.      metoprolol succinate ER 25 MG Oral Tablet 24 Hr Take 1 tablet (25 mg total) by mouth daily. 30 tablet 2    amiodarone 200 MG Oral Tab Take 0.5 tablets (100 mg total) by mouth daily. 90 tablet 3    cefpodoxime 200 MG Oral Tab Take 1 tablet (200 mg total) by mouth 2 (two) times daily for 10 days. (Patient not taking: Reported on 12/16/2024) 20 tablet 0      Past Medical History:    Anesthesia complication    Anxiety    Arthritis    Atrial fibrillation (HCC)    Back problem    Colitis    Deep vein thrombosis (HCC)    right leg    Disorder of thyroid    Essential hypertension    Fibromyalgia    Hyperlipidemia    Hypothyroidism    Migraines    Osteoarthritis    PONV (postoperative nausea and vomiting)    Stroke (HCC)    TIA    Visual impairment    glasses      Past Surgical  History:   Procedure Laterality Date    Appendectomy             section x 3     Hemorrhoidectomy      Knee replacement surgery  2020    Other surgical history  1986    Fibroid    Other surgical history      breast lump    Total knee replacement Left           Social History:  Social History     Socioeconomic History    Marital status:    Tobacco Use    Smoking status: Never    Smokeless tobacco: Never   Vaping Use    Vaping status: Never Used   Substance and Sexual Activity    Alcohol use: Not Currently    Drug use: Never    Sexual activity: Not Currently     Social Drivers of Health     Financial Resource Strain: Low Risk  (3/12/2024)    Financial Resource Strain     Med Affordability: No   Food Insecurity: No Food Insecurity (3/8/2024)    Food Insecurity     Food Insecurity: Never true   Transportation Needs: No Transportation Needs (3/8/2024)    Transportation Needs     Lack of Transportation: No   Housing Stability: Low Risk  (3/8/2024)    Housing Stability     Housing Instability: No      Family History   Problem Relation Age of Onset    Heart Disorder Father     Cancer Mother         ?liver       Allergies[1]     REVIEW OF SYSTEMS:     Review of Systems   Constitutional:  Negative for fever.   HENT: Negative.     Respiratory:  Negative for cough, shortness of breath and wheezing.    Cardiovascular:  Negative for chest pain.   Gastrointestinal: Negative.    Genitourinary:  Positive for frequency and urgency.   Musculoskeletal: Negative.    Skin: Negative.    Neurological: Negative.    Psychiatric/Behavioral: Negative.        Wt Readings from Last 5 Encounters:   24 178 lb (80.7 kg)   24 174 lb (78.9 kg)   24 171 lb (77.6 kg)   24 171 lb (77.6 kg)   24 174 lb (78.9 kg)     Body mass index is 27.06 kg/m².      EXAM:   /76 (BP Location: Right arm, Patient Position: Sitting, Cuff Size: adult)   Pulse 62   Resp 16   Ht 5' 8\" (1.727 m)   Wt 178 lb  (80.7 kg)   BMI 27.06 kg/m²     Physical Exam  Vitals reviewed.   Constitutional:       Appearance: Normal appearance.   HENT:      Head: Normocephalic.   Cardiovascular:      Rate and Rhythm: Normal rate and regular rhythm.      Pulses: Normal pulses.   Pulmonary:      Breath sounds: Normal breath sounds. No wheezing.   Musculoskeletal:         General: No swelling. Normal range of motion.   Skin:     General: Skin is warm and dry.   Neurological:      Mental Status: She is alert and oriented to person, place, and time.   Psychiatric:         Mood and Affect: Mood normal.         Behavior: Behavior normal.            ASSESSMENT AND PLAN:   1. Urinary urgency  - urine culture negative   - patient continues to have symptoms  - will refer to urology for further work up  - UROLOGY - INTERNAL      The patient indicates understanding of these issues and agrees to the plan.  Return for make an appointment with urology .         [1]   Allergies  Allergen Reactions    Radiology Contrast Iodinated Dyes UNKNOWN     Unable to recall

## 2025-02-10 ENCOUNTER — APPOINTMENT (OUTPATIENT)
Dept: GENERAL RADIOLOGY | Facility: HOSPITAL | Age: 70
End: 2025-02-10
Attending: EMERGENCY MEDICINE
Payer: MEDICARE

## 2025-02-10 ENCOUNTER — HOSPITAL ENCOUNTER (EMERGENCY)
Facility: HOSPITAL | Age: 70
Discharge: HOME | End: 2025-02-10
Attending: EMERGENCY MEDICINE
Payer: MEDICARE

## 2025-02-10 VITALS
DIASTOLIC BLOOD PRESSURE: 76 MMHG | HEART RATE: 60 BPM | RESPIRATION RATE: 14 BRPM | BODY MASS INDEX: 26.97 KG/M2 | OXYGEN SATURATION: 96 % | HEIGHT: 68 IN | SYSTOLIC BLOOD PRESSURE: 131 MMHG | WEIGHT: 177.94 LBS | TEMPERATURE: 98 F

## 2025-02-10 DIAGNOSIS — I49.3 PVC'S (PREMATURE VENTRICULAR CONTRACTIONS): Primary | ICD-10-CM

## 2025-02-10 LAB
ANION GAP SERPL CALC-SCNC: 6 MMOL/L (ref 0–18)
ATRIAL RATE: 76 BPM
BASOPHILS # BLD AUTO: 0.01 X10(3) UL (ref 0–0.2)
BASOPHILS NFR BLD AUTO: 0.3 %
BUN BLD-MCNC: 14 MG/DL (ref 9–23)
BUN/CREAT SERPL: 14.9 (ref 10–20)
CALCIUM BLD-MCNC: 9 MG/DL (ref 8.7–10.4)
CHLORIDE SERPL-SCNC: 106 MMOL/L (ref 98–112)
CO2 SERPL-SCNC: 28 MMOL/L (ref 21–32)
CREAT BLD-MCNC: 0.94 MG/DL
DEPRECATED RDW RBC AUTO: 43.8 FL (ref 35.1–46.3)
EGFRCR SERPLBLD CKD-EPI 2021: 65 ML/MIN/1.73M2 (ref 60–?)
EOSINOPHIL # BLD AUTO: 0.39 X10(3) UL (ref 0–0.7)
EOSINOPHIL NFR BLD AUTO: 11.2 %
ERYTHROCYTE [DISTWIDTH] IN BLOOD BY AUTOMATED COUNT: 13.3 % (ref 11–15)
GLUCOSE BLD-MCNC: 94 MG/DL (ref 70–99)
HCT VFR BLD AUTO: 39 %
HGB BLD-MCNC: 12.9 G/DL
IMM GRANULOCYTES # BLD AUTO: 0.01 X10(3) UL (ref 0–1)
IMM GRANULOCYTES NFR BLD: 0.3 %
LYMPHOCYTES # BLD AUTO: 0.56 X10(3) UL (ref 1–4)
LYMPHOCYTES NFR BLD AUTO: 16.1 %
MCH RBC QN AUTO: 29.4 PG (ref 26–34)
MCHC RBC AUTO-ENTMCNC: 33.1 G/DL (ref 31–37)
MCV RBC AUTO: 88.8 FL
MONOCYTES # BLD AUTO: 0.32 X10(3) UL (ref 0.1–1)
MONOCYTES NFR BLD AUTO: 9.2 %
NEUTROPHILS # BLD AUTO: 2.19 X10 (3) UL (ref 1.5–7.7)
NEUTROPHILS # BLD AUTO: 2.19 X10(3) UL (ref 1.5–7.7)
NEUTROPHILS NFR BLD AUTO: 62.9 %
OSMOLALITY SERPL CALC.SUM OF ELEC: 290 MOSM/KG (ref 275–295)
P AXIS: 34 DEGREES
P-R INTERVAL: 162 MS
PLATELET # BLD AUTO: 159 10(3)UL (ref 150–450)
POTASSIUM SERPL-SCNC: 4 MMOL/L (ref 3.5–5.1)
Q-T INTERVAL: 396 MS
QRS DURATION: 82 MS
QTC CALCULATION (BEZET): 445 MS
R AXIS: 6 DEGREES
RBC # BLD AUTO: 4.39 X10(6)UL
SODIUM SERPL-SCNC: 140 MMOL/L (ref 136–145)
T AXIS: 64 DEGREES
VENTRICULAR RATE: 76 BPM
WBC # BLD AUTO: 3.5 X10(3) UL (ref 4–11)

## 2025-02-10 PROCEDURE — 71045 X-RAY EXAM CHEST 1 VIEW: CPT | Performed by: EMERGENCY MEDICINE

## 2025-02-10 PROCEDURE — 99284 EMERGENCY DEPT VISIT MOD MDM: CPT

## 2025-02-10 PROCEDURE — 36415 COLL VENOUS BLD VENIPUNCTURE: CPT

## 2025-02-10 PROCEDURE — 80048 BASIC METABOLIC PNL TOTAL CA: CPT | Performed by: EMERGENCY MEDICINE

## 2025-02-10 PROCEDURE — 93010 ELECTROCARDIOGRAM REPORT: CPT

## 2025-02-10 PROCEDURE — 85025 COMPLETE CBC W/AUTO DIFF WBC: CPT | Performed by: EMERGENCY MEDICINE

## 2025-02-10 PROCEDURE — 93005 ELECTROCARDIOGRAM TRACING: CPT

## 2025-02-10 RX ORDER — MAGNESIUM OXIDE 400 MG/1
400 TABLET ORAL DAILY
Qty: 30 TABLET | Refills: 0 | Status: SHIPPED | OUTPATIENT
Start: 2025-02-10 | End: 2025-03-12

## 2025-02-11 ENCOUNTER — TELEPHONE (OUTPATIENT)
Dept: INTERNAL MEDICINE CLINIC | Facility: CLINIC | Age: 70
End: 2025-02-11

## 2025-02-11 ENCOUNTER — PATIENT OUTREACH (OUTPATIENT)
Dept: CASE MANAGEMENT | Age: 70
End: 2025-02-11

## 2025-02-11 NOTE — ED PROVIDER NOTES
Patient Seen in: [facility] Emergency Department    History     Chief Complaint   Patient presents with    Arrythmia/Palpitations     Stated Complaint: A-fib    HPI    Patient complains of palpitations has hx paf wondering if could be that. No fever. No sig sob.  Feels intermittent irregularity in heartbead.    Alleviating factors: none  Exacerbating factors: none    Past Medical History:    Anesthesia complication    Anxiety    Arthritis    Atrial fibrillation (HCC)    Back problem    Colitis    Deep vein thrombosis (HCC)    right leg    Disorder of thyroid    Essential hypertension    Fibromyalgia    Hyperlipidemia    Hypothyroidism    Migraines    Osteoarthritis    PONV (postoperative nausea and vomiting)    Stroke (HCC)    TIA    Visual impairment    glasses       Past Surgical History:   Procedure Laterality Date    Appendectomy             section x 3     Hemorrhoidectomy      Knee replacement surgery  2020    Other surgical history  1986    Fibroid    Other surgical history      breast lump    Total knee replacement Left                 Family History   Problem Relation Age of Onset    Heart Disorder Father     Cancer Mother         ?liver        Social History     Socioeconomic History    Marital status:    Tobacco Use    Smoking status: Never    Smokeless tobacco: Never   Vaping Use    Vaping status: Never Used   Substance and Sexual Activity    Alcohol use: Not Currently    Drug use: Never    Sexual activity: Not Currently     Social Drivers of Health     Food Insecurity: No Food Insecurity (3/8/2024)    Food Insecurity     Food Insecurity: Never true   Transportation Needs: No Transportation Needs (3/8/2024)    Transportation Needs     Lack of Transportation: No   Housing Stability: Low Risk  (3/8/2024)    Housing Stability     Housing Instability: No       Review of Systems    Positive for stated complaint: A-fib  Other systems are as noted in HPI.  Constitutional  and vital signs reviewed.      All other systems reviewed and negative except as noted above.    PSFH elements reviewed from today and agreed except as otherwise stated in HPI.    Physical Exam     ED Triage Vitals [02/10/25 0856]   BP (!) 161/86   Pulse 78   Resp 20   Temp 98.1 °F (36.7 °C)   Temp src Oral   SpO2 98 %   O2 Device None (Room air)       Current:/76   Pulse 60   Temp 98.1 °F (36.7 °C) (Oral)   Resp 14   Ht 172.7 cm (5' 8\")   Wt 80.7 kg   SpO2 96%   BMI 27.05 kg/m²    PULSE OX nle  GENERAL: awake alert   HEAD: normocephalic, atraumatic,   EYES: PERRLA, EOMI, conj sclera clear  THROAT: mmm, no lesions  NECK: supple, no meningeal signs  LUNGS: no resp distress, cta bilateral  CARDIO: RRR without murmur  GI: abdomen is soft and non tender, no masses, nl bowel sounds   EXTREMITIES: from, 5/5 strength in all 4 ext, no edema  NEURO: alert and oiented *3, 2-12 intact, no focal deficit noted  SKIN: good skin turgor, no  rashes  PSYCH: calm, cooperative,    Differential includes:pvc vs pac vs PAF    ED Course     Labs Reviewed   CBC WITH DIFFERENTIAL WITH PLATELET - Abnormal; Notable for the following components:       Result Value    WBC 3.5 (*)     Lymphocyte Absolute 0.56 (*)     All other components within normal limits   BASIC METABOLIC PANEL (8) - Normal     EKG    Rate, intervals and axes as noted on EKG Report.  Rate: 76  Rhythm: Sinus Rhythm  Reading: nsr with pac's           MDM       Cardiac Monitor:   Pulse Readings from Last 1 Encounters:   02/10/25 60   , sinus, pvc/pac  interpreted by me.    Radiology findings:   No results found.        Medical Decision Making  Problems Addressed:  PVC's (premature ventricular contractions): acute illness or injury    Amount and/or Complexity of Data Reviewed  Labs: ordered. Decision-making details documented in ED Course.  ECG/medicine tests: ordered and independent interpretation performed. Decision-making details documented in ED  Course.  Discussion of management or test interpretation with external provider(s): Trial magnesium fu with cardiology    Risk  Prescription drug management.    No results found.      Disposition and Plan     Clinical Impression:  1. PVC's (premature ventricular contractions)        Disposition:  Discharge    Follow-up:  Johan Hoffman MD  [address] [facility] [address]  [facility] [address]  [phone]    Follow up        Medications Prescribed:  Discharge Medication List as of 2/10/2025 10:27 AM        START taking these medications    Details   magnesium oxide 400 MG Oral Tab Take 1 tablet (400 mg total) by mouth daily., Normal, Disp-30 tablet, R-0

## 2025-02-11 NOTE — PROGRESS NOTES
TCM has contacted patient and patient needs additional appointments.  Please contact patient for assistance with scheduling a follow-up appointment for Cardiology.  Thank you!    Please note patient will need an  for the call      Follow up with Johan Hoffman MD (Cardiac Electrophysiology)   Cardiac Electrophysiology, CARDIOLOGY Contacto: [facility] [address] [facility] [id number] [phone]

## 2025-02-11 NOTE — TELEPHONE ENCOUNTER
Talked to Haley (SAMANTHA) offered her an appointment for patient but she says she will talk with patient then make an appointment thru patient Sylvester.

## 2025-02-11 NOTE — PROGRESS NOTES
Transitions of Care Navigation  Discharge Date: 2/10/25  Contact Date: 2025    Transitions of Care Assessment:  TRINA Initial Assessment    General:  Assessment completed with: Children  Patient Subjective: Spoke with patient' s daughter Haley. Haley states her mother is doing much better. Haley states her mother has been up and moving. She was cooking yesterday. Haley denies the patient having any chest pain, palpitations, shortness of breath, fevers, nausea, vomiting, diarrhea. Haley denies any questions or concerns at this time.  Chief Complaint: Primary Diagnosis PVC's (premature ventricular contractions)  Verify patient name and  with patient/ caregiver: Yes    Hospital Stay/Discharge:  Tell me what you understand of why you were in the hospital or emergency department: Palpitations  Prior to leaving the hospital were your Discharge Instructions reviewed with you?: Yes  Did you receive a copy of your written Discharge Instructions?: Yes  What questions do you have about your Discharge Instructions?: Patient's daughter Haley denies  Do you feel better or worse since you left the hospital or emergency department?: Better    Follow - Up Appointment:  Do you have a follow-up appointment?: No  Are there any barriers to getting to your follow-up appointment?: No    Home Health/DME:  Prior to leaving the hospital was Home Health (HH) arranged for you?: N/A  Are HH needs identified by staff during the assessment?: No     Prior to leaving the hospital or emergency department was Durable Medical Equipment (DME), medical supplies, or infusions arranged for you?: N/A  Are DME/medical supply/infusions needs identified by staff during this assessment?: No     Medications/Diet:  Did any of your medications change, during or after your hospital stay or ED visit?: Yes  Do you have your new or updated medications?: Yes  Do you understand what your medications are for and possible side effects?: Yes  Are there any reasons  that keep you from taking your medication as prescribed?: No  Any concerns about medication refills?: No    Were you given a different diet per your Discharge Instructions?: No     Questions/Concerns:  Do you have any questions or concerns that have not been discussed?: No       Nursing Interventions:    Medication changes reviewed with the patient's daughter. Nurse Care Manager attempted to schedule an ER follow up appointment. Patient's daughter Haley requested her mother to be contacted today at the home phone to set up appointment. Patient is currently sleeping so unable to schedule something right at this moment. A message was sent to the PCP office to assist with scheduling.     Message sent to the TST to assist with scheduling a Cardiology follow up appointment.     All d/c instructions reviewed with pt.  Reviewed when to call MD vs when to go to ER/call 911.  Educated pt on the importance of taking all meds as prescribed as well as close f/u with PCP/specialists.  Pt verbalized understanding and will contact office with any further questions or concerns.       Medications:  Medication Reconciliation:  I am aware of an inpatient discharge within the last 30 days.  The discharge medication list has been reconciled with the patient's current medication list and reviewed by me. See medication list for additions of new medication, and changes to current doses of medications and discontinued medications.  Current Outpatient Medications   Medication Sig Dispense Refill    magnesium oxide 400 MG Oral Tab Take 1 tablet (400 mg total) by mouth daily. 30 tablet 0    rivaroxaban (XARELTO) 20 MG Oral Tab Take 1 tablet (20 mg total) by mouth daily with food. 90 tablet 3    DULoxetine 60 MG Oral Cap DR Particles Take 1 capsule (60 mg total) by mouth daily.      atorvastatin 10 MG Oral Tab Take 1 tablet (10 mg total) by mouth nightly. 90 tablet 3    losartan 25 MG Oral Tab Take 1 tablet (25 mg total) by mouth daily.       metoprolol succinate ER 25 MG Oral Tablet 24 Hr Take 1 tablet (25 mg total) by mouth daily. 30 tablet 2    amiodarone 200 MG Oral Tab Take 0.5 tablets (100 mg total) by mouth daily. 90 tablet 3         Follow-up Appointments:      Transitional Care Clinic  Was TCC Ordered: No    Primary Care Provider (If no TCC appointment)  Does patient already have a PCP appointment scheduled? No  Nurse Care Manager Attempted to schedule PCP office ER Follow-up appointment with patient   -If no appointment scheduled: Explain     Nurse Care Manager attempted to schedule an ER follow up appointment. Patient's daughter Haley requested her mother to be contacted today at the home phone to set up appointment. Patient is currently sleeping so unable to schedule something right at this moment. A message was sent to the PCP office to assist with scheduling.     Specialist  Does the patient have any other follow-up appointment(s) need to be scheduled? No   -If yes: Nurse Care Manager reviewed upcoming specialist appointments with patient: Yes   -Does the patient need assistance scheduling appointment(s): Yes, message to TST team      Book By Date: 02/17/2025

## 2025-02-11 NOTE — TELEPHONE ENCOUNTER
Spoke to patient's daughter Haley  for Transitions of Care call today.  Patient does not have an appointment scheduled at this time.      Nurse Care Manager attempted to schedule an ER follow up appointment. Patient's daughter Haley requested her mother to be contacted today at the home phone to set up appointment. Patient is currently sleeping so unable to schedule something right at this moment.      ER Follow-up appointment needed by 02/17/2025.  Please advise.    BOOK BY DATE: 02/17/2025    Clinical staff:  Please follow-up with patient and try to get them to schedule as patient would greatly benefit from ER Follow-up.  Thank you!

## 2025-02-11 NOTE — PROGRESS NOTES
TCM Request   ED Hospital Follow up for PCP/ Cardio (Discharge 2/10 EDW)     PCP   Rafaela Mccord MD   [address] [place] [address]  [phone]  Cardio   Johan Hoffman MD  [address] [facility] [address]  [facility] [address]  [phone]    Attempt #1:  Left message on voicemail for patient to call transitions specialist back to schedule follow up appointments. Provided Transitions specialist scheduling phone number [phone].

## 2025-02-12 NOTE — PROGRESS NOTES
TCM Request       ED Hospital Follow up for PCP/ Cardio (Discharge 2/10 EDW)      PCP Request :  Rafaela Mccord MD   [address] [place] [address]  [phone]      Cardio Request :  Johan Hoffman MD  [address] [facility] [address]  [facility] [address]  [phone]        Attempt #2:  Left message on voicemail for patient's daughter Abbie Álvarez, to call transitions specialist back to schedule follow up appointments. Provided Transitions specialist scheduling phone number [phone].

## 2025-02-13 NOTE — TELEPHONE ENCOUNTER
Current Outpatient Medications:     amiodarone 200 MG Oral Tab, Take 0.5 tablets (100 mg total) by mouth daily., Disp: 90 tablet, Rfl: 3

## 2025-02-13 NOTE — PROGRESS NOTES
TCM Request   ED Hospital Follow up for PCP/ Cardio (Discharge 2/10 EDW)     PCP   Rafaela Mccord MD   [address] [place], [place] [id number] [phone]  Appt made for 2/19@5:30pm with aprn  Cardio   Johan Hoffman MD  [address] [facility] [address]  [facility] [id number] [phone]  Pt declined to make follow up appt     Confirmed with pt's daughter   Closing encounter

## 2025-02-17 RX ORDER — AMIODARONE HYDROCHLORIDE 200 MG/1
100 TABLET ORAL DAILY
Qty: 90 TABLET | Refills: 3 | OUTPATIENT
Start: 2025-02-17

## 2025-02-17 NOTE — TELEPHONE ENCOUNTER
Amiodarone 200 mg :  is being managed by Cardio Dr.Pratik Rashid.   My Chart message to patient to contact Cardio directly for refills.   Contacted pharmacy and spoke to pharmacist to contact Cardio directly.

## 2025-02-19 ENCOUNTER — OFFICE VISIT (OUTPATIENT)
Dept: INTERNAL MEDICINE CLINIC | Facility: CLINIC | Age: 70
End: 2025-02-19

## 2025-02-19 VITALS
SYSTOLIC BLOOD PRESSURE: 129 MMHG | HEIGHT: 68 IN | BODY MASS INDEX: 26.22 KG/M2 | HEART RATE: 70 BPM | DIASTOLIC BLOOD PRESSURE: 74 MMHG | WEIGHT: 173 LBS | OXYGEN SATURATION: 98 %

## 2025-02-19 DIAGNOSIS — R00.2 PALPITATIONS: Primary | ICD-10-CM

## 2025-02-19 DIAGNOSIS — E55.9 VITAMIN D DEFICIENCY: ICD-10-CM

## 2025-02-19 DIAGNOSIS — I48.0 PAROXYSMAL ATRIAL FIBRILLATION (HCC): Chronic | ICD-10-CM

## 2025-02-19 DIAGNOSIS — Z00.00 ROUTINE GENERAL MEDICAL EXAMINATION AT A HEALTH CARE FACILITY: ICD-10-CM

## 2025-02-19 PROCEDURE — 99213 OFFICE O/P EST LOW 20 MIN: CPT

## 2025-02-19 RX ORDER — TRAMADOL HYDROCHLORIDE 50 MG/1
50 TABLET ORAL EVERY 6 HOURS PRN
COMMUNITY
Start: 2025-02-14

## 2025-02-19 NOTE — PROGRESS NOTES
Subjective:   Brittnee Álvarez is a 69 year old female who presents for Hospital F/U     Presents with her daughter for hospital f/u - seen in ED on 2/10 for PVCs  Was prescribed magnesium but has not taken it yet  No magnesium level was checked in the hospital  No further palpitations since leaving the hospital   Has history of PAF   Was given a referral to Dr. Johan Hoffman     History/Other:    Chief Complaint Reviewed and Verified  No Further Nursing Notes to   Review  Tobacco Reviewed  Allergies Reviewed  Medications Reviewed    Problem List Reviewed  Medical History Reviewed  Surgical History   Reviewed  OB Status Reviewed  Family History Reviewed         Tobacco:  She has never smoked tobacco.    Current Outpatient Medications   Medication Sig Dispense Refill    traMADol 50 MG Oral Tab Take 1 tablet (50 mg total) by mouth every 6 (six) hours as needed for Pain.      rivaroxaban (XARELTO) 20 MG Oral Tab Take 1 tablet (20 mg total) by mouth daily with food. 90 tablet 3    DULoxetine 60 MG Oral Cap DR Particles Take 1 capsule (60 mg total) by mouth daily.      atorvastatin 10 MG Oral Tab Take 1 tablet (10 mg total) by mouth nightly. 90 tablet 3    losartan 25 MG Oral Tab Take 1 tablet (25 mg total) by mouth daily.      metoprolol succinate ER 25 MG Oral Tablet 24 Hr Take 1 tablet (25 mg total) by mouth daily. 30 tablet 2    amiodarone 200 MG Oral Tab Take 0.5 tablets (100 mg total) by mouth daily. 90 tablet 3    magnesium oxide 400 MG Oral Tab Take 1 tablet (400 mg total) by mouth daily. (Patient not taking: Reported on 2/19/2025) 30 tablet 0     Review of Systems:  Review of Systems  10 point review of systems otherwise negative with the exception of HPI and assessment and plan    Objective:   /74   Pulse 70   Ht 5' 8\" (1.727 m)   Wt 173 lb (78.5 kg)   SpO2 98%   BMI 26.30 kg/m²  Estimated body mass index is 26.3 kg/m² as calculated from the following:    Height as of this encounter: 5' 8\"  (1.727 m).    Weight as of this encounter: 173 lb (78.5 kg).  Physical Exam  Vitals reviewed.   Constitutional:       General: She is not in acute distress.     Appearance: Normal appearance. She is well-developed.   Cardiovascular:      Rate and Rhythm: Normal rate and regular rhythm.      Heart sounds: Normal heart sounds.   Pulmonary:      Effort: Pulmonary effort is normal.      Breath sounds: Normal breath sounds.   Skin:     General: Skin is warm and dry.   Neurological:      Mental Status: She is alert and oriented to person, place, and time.       Assessment & Plan:   1. Palpitations (Primary)  -     Magnesium; Future; Expected date: 02/19/2025  2. Paroxysmal atrial fibrillation (HCC)  -     Magnesium; Future; Expected date: 02/19/2025  3. Routine general medical examination at a health care facility  -     CBC With Differential With Platelet; Future; Expected date: 02/19/2025  -     Comp Metabolic Panel (14); Future; Expected date: 02/19/2025  -     Lipid Panel; Future; Expected date: 02/19/2025  -     TSH W Reflex To Free T4; Future; Expected date: 02/19/2025  4. Vitamin D deficiency  -     Vitamin D; Future; Expected date: 02/19/2025    Return for medicare annual   Follow up with MHIAI Welch, 2/19/2025, 5:32 PM

## 2025-03-01 ENCOUNTER — LAB ENCOUNTER (OUTPATIENT)
Dept: LAB | Age: 70
End: 2025-03-01
Payer: MEDICARE

## 2025-03-01 DIAGNOSIS — Z00.00 ROUTINE GENERAL MEDICAL EXAMINATION AT A HEALTH CARE FACILITY: ICD-10-CM

## 2025-03-01 DIAGNOSIS — I48.0 PAROXYSMAL ATRIAL FIBRILLATION (HCC): Chronic | ICD-10-CM

## 2025-03-01 DIAGNOSIS — E55.9 VITAMIN D DEFICIENCY: ICD-10-CM

## 2025-03-01 DIAGNOSIS — R00.2 PALPITATIONS: ICD-10-CM

## 2025-03-01 LAB
ALBUMIN SERPL-MCNC: 4.1 G/DL (ref 3.2–4.8)
ALBUMIN/GLOB SERPL: 1.5 {RATIO} (ref 1–2)
ALP LIVER SERPL-CCNC: 79 U/L
ALT SERPL-CCNC: 13 U/L
ANION GAP SERPL CALC-SCNC: 9 MMOL/L (ref 0–18)
AST SERPL-CCNC: 23 U/L (ref ?–34)
BASOPHILS # BLD AUTO: 0.01 X10(3) UL (ref 0–0.2)
BASOPHILS NFR BLD AUTO: 0.1 %
BILIRUB SERPL-MCNC: 0.4 MG/DL (ref 0.2–1.1)
BUN BLD-MCNC: 14 MG/DL (ref 9–23)
BUN/CREAT SERPL: 13.5 (ref 10–20)
CALCIUM BLD-MCNC: 9.9 MG/DL (ref 8.7–10.4)
CHLORIDE SERPL-SCNC: 105 MMOL/L (ref 98–112)
CHOLEST SERPL-MCNC: 141 MG/DL (ref ?–200)
CO2 SERPL-SCNC: 27 MMOL/L (ref 21–32)
CREAT BLD-MCNC: 1.04 MG/DL
DEPRECATED RDW RBC AUTO: 44.5 FL (ref 35.1–46.3)
EGFRCR SERPLBLD CKD-EPI 2021: 58 ML/MIN/1.73M2 (ref 60–?)
EOSINOPHIL # BLD AUTO: 0.75 X10(3) UL (ref 0–0.7)
EOSINOPHIL NFR BLD AUTO: 10.3 %
ERYTHROCYTE [DISTWIDTH] IN BLOOD BY AUTOMATED COUNT: 13.4 % (ref 11–15)
FASTING PATIENT LIPID ANSWER: YES
FASTING STATUS PATIENT QL REPORTED: YES
GLOBULIN PLAS-MCNC: 2.7 G/DL (ref 2–3.5)
GLUCOSE BLD-MCNC: 98 MG/DL (ref 70–99)
HCT VFR BLD AUTO: 39.2 %
HDLC SERPL-MCNC: 44 MG/DL (ref 40–59)
HGB BLD-MCNC: 12.7 G/DL
IMM GRANULOCYTES # BLD AUTO: 0.02 X10(3) UL (ref 0–1)
IMM GRANULOCYTES NFR BLD: 0.3 %
LDLC SERPL CALC-MCNC: 82 MG/DL (ref ?–100)
LYMPHOCYTES # BLD AUTO: 0.79 X10(3) UL (ref 1–4)
LYMPHOCYTES NFR BLD AUTO: 10.9 %
MAGNESIUM SERPL-MCNC: 2 MG/DL (ref 1.6–2.6)
MCH RBC QN AUTO: 29.8 PG (ref 26–34)
MCHC RBC AUTO-ENTMCNC: 32.4 G/DL (ref 31–37)
MCV RBC AUTO: 92 FL
MONOCYTES # BLD AUTO: 0.73 X10(3) UL (ref 0.1–1)
MONOCYTES NFR BLD AUTO: 10.1 %
NEUTROPHILS # BLD AUTO: 4.95 X10 (3) UL (ref 1.5–7.7)
NEUTROPHILS # BLD AUTO: 4.95 X10(3) UL (ref 1.5–7.7)
NEUTROPHILS NFR BLD AUTO: 68.3 %
NONHDLC SERPL-MCNC: 97 MG/DL (ref ?–130)
OSMOLALITY SERPL CALC.SUM OF ELEC: 292 MOSM/KG (ref 275–295)
PLATELET # BLD AUTO: 182 10(3)UL (ref 150–450)
POTASSIUM SERPL-SCNC: 4.3 MMOL/L (ref 3.5–5.1)
PROT SERPL-MCNC: 6.8 G/DL (ref 5.7–8.2)
RBC # BLD AUTO: 4.26 X10(6)UL
SODIUM SERPL-SCNC: 141 MMOL/L (ref 136–145)
TRIGL SERPL-MCNC: 77 MG/DL (ref 30–149)
TSI SER-ACNC: 3.07 UIU/ML (ref 0.55–4.78)
VIT D+METAB SERPL-MCNC: 33.3 NG/ML (ref 30–100)
VLDLC SERPL CALC-MCNC: 12 MG/DL (ref 0–30)
WBC # BLD AUTO: 7.3 X10(3) UL (ref 4–11)

## 2025-03-01 PROCEDURE — 80053 COMPREHEN METABOLIC PANEL: CPT

## 2025-03-01 PROCEDURE — 80061 LIPID PANEL: CPT

## 2025-03-01 PROCEDURE — 36415 COLL VENOUS BLD VENIPUNCTURE: CPT

## 2025-03-01 PROCEDURE — 82306 VITAMIN D 25 HYDROXY: CPT

## 2025-03-01 PROCEDURE — 84443 ASSAY THYROID STIM HORMONE: CPT

## 2025-03-01 PROCEDURE — 83735 ASSAY OF MAGNESIUM: CPT

## 2025-03-01 PROCEDURE — 85025 COMPLETE CBC W/AUTO DIFF WBC: CPT

## 2025-03-17 RX ORDER — LOSARTAN POTASSIUM 25 MG/1
25 TABLET ORAL 2 TIMES DAILY
Qty: 180 TABLET | Refills: 0 | OUTPATIENT
Start: 2025-03-17

## 2025-03-19 ENCOUNTER — OFFICE VISIT (OUTPATIENT)
Dept: INTERNAL MEDICINE CLINIC | Facility: CLINIC | Age: 70
End: 2025-03-19

## 2025-03-19 VITALS
BODY MASS INDEX: 25.91 KG/M2 | HEART RATE: 66 BPM | WEIGHT: 171 LBS | DIASTOLIC BLOOD PRESSURE: 80 MMHG | OXYGEN SATURATION: 98 % | HEIGHT: 68 IN | SYSTOLIC BLOOD PRESSURE: 131 MMHG

## 2025-03-19 DIAGNOSIS — Z12.31 SCREENING MAMMOGRAM FOR BREAST CANCER: ICD-10-CM

## 2025-03-19 DIAGNOSIS — I48.91 ATRIAL FIBRILLATION WITH RAPID VENTRICULAR RESPONSE (HCC): ICD-10-CM

## 2025-03-19 DIAGNOSIS — E55.9 VITAMIN D DEFICIENCY: ICD-10-CM

## 2025-03-19 DIAGNOSIS — Z12.11 COLON CANCER SCREENING: ICD-10-CM

## 2025-03-19 DIAGNOSIS — Z23 NEED FOR VACCINATION: ICD-10-CM

## 2025-03-19 DIAGNOSIS — I48.0 PAROXYSMAL ATRIAL FIBRILLATION (HCC): Chronic | ICD-10-CM

## 2025-03-19 DIAGNOSIS — Z78.0 POST-MENOPAUSAL: ICD-10-CM

## 2025-03-19 DIAGNOSIS — I10 ESSENTIAL HYPERTENSION: ICD-10-CM

## 2025-03-19 DIAGNOSIS — M17.0 PRIMARY OSTEOARTHRITIS OF BOTH KNEES: ICD-10-CM

## 2025-03-19 DIAGNOSIS — E78.5 DYSLIPIDEMIA: ICD-10-CM

## 2025-03-19 DIAGNOSIS — H61.22 LEFT EAR IMPACTED CERUMEN: ICD-10-CM

## 2025-03-19 DIAGNOSIS — R12 HEARTBURN: ICD-10-CM

## 2025-03-19 DIAGNOSIS — Z00.00 ENCOUNTER FOR ANNUAL HEALTH EXAMINATION: Primary | ICD-10-CM

## 2025-03-19 DIAGNOSIS — E03.9 ACQUIRED HYPOTHYROIDISM: Chronic | ICD-10-CM

## 2025-03-19 DIAGNOSIS — F41.9 ANXIETY: ICD-10-CM

## 2025-03-19 DIAGNOSIS — I82.452 ACUTE DEEP VEIN THROMBOSIS (DVT) OF LEFT PERONEAL VEIN (HCC): ICD-10-CM

## 2025-03-19 DIAGNOSIS — I83.813 VARICOSE VEINS OF BOTH LOWER EXTREMITIES WITH PAIN: ICD-10-CM

## 2025-03-19 PROBLEM — R07.9 CHEST PAIN OF UNCERTAIN ETIOLOGY: Status: RESOLVED | Noted: 2023-07-09 | Resolved: 2025-03-19

## 2025-03-19 PROBLEM — R60.0 LOCALIZED EDEMA: Status: RESOLVED | Noted: 2022-07-06 | Resolved: 2025-03-19

## 2025-03-19 PROBLEM — F13.20 SEDATIVE, HYPNOTIC OR ANXIOLYTIC DEPENDENCE, UNCOMPLICATED (HCC): Status: RESOLVED | Noted: 2022-07-06 | Resolved: 2025-03-19

## 2025-03-19 PROCEDURE — G0439 PPPS, SUBSEQ VISIT: HCPCS

## 2025-03-19 PROCEDURE — 99499 UNLISTED E&M SERVICE: CPT

## 2025-03-19 PROCEDURE — 90662 IIV NO PRSV INCREASED AG IM: CPT

## 2025-03-19 PROCEDURE — G0008 ADMIN INFLUENZA VIRUS VAC: HCPCS

## 2025-03-19 RX ORDER — PANTOPRAZOLE SODIUM 40 MG/1
40 TABLET, DELAYED RELEASE ORAL
Qty: 90 TABLET | Refills: 0 | Status: SHIPPED | OUTPATIENT
Start: 2025-03-19

## 2025-03-19 NOTE — PROGRESS NOTES
Subjective:   Brittnee Álvarez is a 69 year old female who presents for a Medicare Subsequent Annual Wellness visit (Pt already had Initial Annual Wellness) and scheduled follow up of multiple significant but stable problems.     Presents for medicare annual   Having heartburn     Has not followed back up with cardiology yet  No palpations recently    Colonoscopy more than 10 years ago - normal     Daughter unsure about contrast allergy - has had contrast after this was added to her allergy list and did fine     HISTORY 2/2025  Presents with her daughter for hospital f/u - seen in ED on 2/10 for PVCs  Was prescribed magnesium but has not taken it yet  No magnesium level was checked in the hospital  No further palpitations since leaving the hospital   Has history of PAF   Was given a referral to Dr. Johan Hoffman    Today patient comes with her daughter and she is doing okay but  Takes the Norco up to twice a day and takes tramadol in between  But now her right hip hurts  Today might be the last PT at home but pt and family agrees that she may be improved with 1 more week of physical therapy at least at home     5/2023   Saw chiropractor and has issues with the elbows   She is not taking the trazodone as she did not feel like it was helping  She hasnt been taking the thyroid meds wither , not taking the amiodarone bc she ran out   Last saw cards one yr ago   No celobrex , taking metoprolol only once a day prn bc she feels it makes her HR go low   Takes the buspirone when she remembers   She is not using the xanax  Will see rheum June 26th   Will be seeing Dr. Margaux colindres  coming up        HISTORY   bilateral thumb pains and would like Celebrex 200 mg that she is taking in the past and did see her cardiologist recently when was recommended that it is okay for her to take it as long as she takes it sparingly as she is on the Xarelto  C/o coughing all day --still with a chronic cough, saw her GI doctor and had EGD and  was put on pantoprazole 40 mg for acute gastritis-will scan the report  She has been taking it for a couple of months but no help     ---------------------------------------------------------------------------------------------  History  6/2020   new pt --here with her daughter rere - PSR   C/c establish care   C/o will go for left  knee surg July 17th with dr freeman   Any little thing stresses her out   Used to see cardiologist at [facility] and will see dr acevedo   Then saw cardioogist at [facility]  Anxiety -especially at night and has a hard time sleeping --takes it less than once a mn  A fib -on  anticoagulation, Xarelto, sees cardiology-  htn   HL   OA knees   Vit d def   History of blood clot to the right leg status post anticoagulation  History of DVT July 2020 after surgery  Migraines per pt       luke jansen in [facility]       History/Other:   Fall Risk Assessment:   She has been screened for Falls and is High Risk. Fall Prevention information provided to patient in After Visit Summary.    Do you feel unsteady when standing or walking?: (Patient-Rptd) Yes  Do you worry about falling?: (Patient-Rptd) Yes  Have you fallen in the past year?: (Patient-Rptd) No     Cognitive Assessment:   She had a completely normal cognitive assessment - see flowsheet entries     Functional Ability/Status:   Brittnee Álvarez has some abnormal functions as listed below:  She has Driving difficulties based on screening of functional status. She has difficulties Managing Money/Bills based on screening of functional status.She has difficulties Shopping for Groceries based on screening of functional status. She has difficulties Affording Meds based on screening of functional status. She has Vision problems based on screening of functional status.     Depression Screening (PHQ):  PHQ-2 SCORE: 0  , done 3/19/2025     Advanced Directives:   She does NOT have a Living Will. [Do you have a living will?:  (Patient-Rptd) No]  She does NOT have a Power of  for Health Care. [Do you have a healthcare power of ?: (Patient-Rptd) No]  Discussed Advance Care Planning with patient (and family/surrogate if present). Standard forms made available to patient in After Visit Summary.      Patient Active Problem List   Diagnosis    Atrial fibrillation with rapid ventricular response (HCC)    Essential hypertension    Primary osteoarthritis of both knees    Anxiety    Primary osteoarthritis of left knee    Dyslipidemia    Hypothyroidism    Paroxysmal atrial fibrillation (HCC)    Acute deep vein thrombosis (DVT) of left peroneal vein (HCC)    Osteoarthritis of carpometacarpal (CMC) joints of both thumbs    Varicose veins of both lower extremities with pain    Vitamin D deficiency    Primary osteoarthritis of right knee    Postoperative pain     Allergies:  She is allergic to radiology contrast iodinated dyes.    Current Medications:  Outpatient Medications Marked as Taking for the 3/19/25 encounter (Office Visit) with Elena Naylor APRN   Medication Sig    pantoprazole 40 MG Oral Tab EC Take 1 tablet (40 mg total) by mouth every morning before breakfast.    traMADol 50 MG Oral Tab Take 1 tablet (50 mg total) by mouth every 6 (six) hours as needed for Pain.    rivaroxaban (XARELTO) 20 MG Oral Tab Take 1 tablet (20 mg total) by mouth daily with food.    DULoxetine 60 MG Oral Cap DR Particles Take 1 capsule (60 mg total) by mouth daily.    [DISCONTINUED] atorvastatin 10 MG Oral Tab Take 1 tablet (10 mg total) by mouth nightly.    losartan 25 MG Oral Tab Take 1 tablet (25 mg total) by mouth daily.    metoprolol succinate ER 25 MG Oral Tablet 24 Hr Take 1 tablet (25 mg total) by mouth daily.    amiodarone 200 MG Oral Tab Take 0.5 tablets (100 mg total) by mouth daily.       Medical History:  She  has a past medical history of Anesthesia complication, Anxiety, Arthritis, Atrial fibrillation (HCC), Back problem, Chest pain  of uncertain etiology (2023), Colitis, Deep vein thrombosis (HCC) (2018), Disorder of thyroid, Essential hypertension, Fibromyalgia, Hyperlipidemia, Hypothyroidism, Migraines, Osteoarthritis, PONV (postoperative nausea and vomiting), Sedative, hypnotic or anxiolytic dependence, uncomplicated (HCC) (2022), Stroke (HCC), and Visual impairment.  Surgical History:  She  has a past surgical history that includes hemorrhoidectomy; ; appendectomy; other surgical history (); other surgical history; total knee replacement (Left); and knee replacement surgery (2020).   Family History:  Her family history includes Cancer in her mother; Heart Disorder in her father.  Social History:  She  reports that she has never smoked. She has never used smokeless tobacco. She reports that she does not currently use alcohol. She reports that she does not use drugs.    Tobacco:  She has never smoked tobacco.    CAGE Alcohol Screen:   CAGE screening score of 0 on 3/19/2025, showing low risk of alcohol abuse.      Patient Care Team:  Rafaela Mccord MD as PCP - General (Internal Medicine)    Review of Systems  10 point review of systems otherwise negative with the exception of HPI and assessment and plan    Objective:   Physical Exam  Vitals reviewed.   Constitutional:       General: She is not in acute distress.     Appearance: Normal appearance. She is well-developed.   HENT:      Right Ear: Tympanic membrane normal.      Left Ear: Tympanic membrane normal.      Mouth/Throat:      Mouth: Mucous membranes are moist.      Pharynx: Oropharynx is clear.   Cardiovascular:      Rate and Rhythm: Normal rate and regular rhythm.      Heart sounds: Normal heart sounds.   Pulmonary:      Effort: Pulmonary effort is normal.      Breath sounds: Normal breath sounds.   Abdominal:      General: Bowel sounds are normal.      Palpations: Abdomen is soft.   Lymphadenopathy:      Cervical: No cervical adenopathy.   Skin:     General:  Skin is warm and dry.   Neurological:      Mental Status: She is alert and oriented to person, place, and time.           /80   Pulse 66   Ht 5' 8\" (1.727 m)   Wt 171 lb (77.6 kg)   SpO2 98%   BMI 26.00 kg/m²  Estimated body mass index is 26 kg/m² as calculated from the following:    Height as of this encounter: 5' 8\" (1.727 m).    Weight as of this encounter: 171 lb (77.6 kg).    Medicare Hearing Assessment:   Hearing Screening    Screening Method: Finger Rub  Finger Rub Result: Pass               Assessment & Plan:   Brittnee Álvarez is a 69 year old female who presents for a Medicare Assessment.     1. Encounter for annual health examination (Primary)  Reviewed recent labs  2. Paroxysmal atrial fibrillation (HCC)  F/u cardiology, no symptoms currently, on xarelto and amiodarone and BB, statin  3. Acute deep vein thrombosis (DVT) of left peroneal vein (HCC)  On xarelto, f/u cardiology  4. Atrial fibrillation with rapid ventricular response (HCC)  F/u cardiology, no symptoms currently, on amiodarone and BB, statin  5. Essential hypertension  Controlled, CPM  6. Dyslipidemia  Controlled, continue statin  7. Varicose veins of both lower extremities with pain  stable  8. Acquired hypothyroidism  Off medication, thyroid function normal 3/2025  9. Vitamin D deficiency  Normal on last blood work  10. Anxiety  Controlled, continue medication  11. Primary osteoarthritis of both knees  stable  12. Colon cancer screening  -     Replaced by [facility] Telephone Colon Screen; Future; Expected date: 03/26/2025  13. Screening mammogram for breast cancer  -     [facility] DANIEL 2D+3D SCREENING BILAT (CPT=77067/99672); Future; Expected date: 03/19/2025  14. Heartburn  -     Pantoprazole Sodium; Take 1 tablet (40 mg total) by mouth every morning before breakfast.  Dispense: 90 tablet; Refill: 0  15. Left ear impacted cerumen  Debrox drops  16. Post-menopausal  -     XR DEXA BONE DENSITOMETRY (CPT=77080); Future; Expected date:  03/19/2025  17. Need for vaccination  -     Fluzone High Dose  65 years and older [88297]    The patient indicates understanding of these issues and agrees to the plan.  Reinforced healthy diet, lifestyle, and exercise.      Return in 6 months (on 9/19/2025).     MIHAI Serrato, 3/19/2025     Supplementary Documentation:   General Health:  In the past six months, have you lost more than 10 pounds without trying?: (Patient-Rptd) 3 - Don't know  Has your appetite been poor?: (Patient-Rptd) No  Type of Diet: (Patient-Rptd) Balanced  How does the patient maintain a good energy level?: (Patient-Rptd) Stretching  How would you describe your daily physical activity?: (Patient-Rptd) Light  How would you describe your current health state?: (Patient-Rptd) Fair  How do you maintain positive mental well-being?: (Patient-Rptd) Social Interaction  On a scale of 0 to 10, with 0 being no pain and 10 being severe pain, what is your pain level?: (Patient-Rptd) 6 - (Moderate)  In the past six months, have you experienced urine leakage?: (Patient-Rptd) 1-Yes  At any time do you feel concerned for the safety/well-being of yourself and/or your children, in your home or elsewhere?: (Patient-Rptd) No  Have you had any immunizations at another office such as Influenza, Hepatitis B, Tetanus, or Pneumococcal?: (Patient-Rptd) No    Health Maintenance   Topic Date Due    Colorectal Cancer Screening  Never done    Annual Physical  Never done    Mammogram  Never done    Zoster Vaccines (1 of 2) Never done    DEXA Scan  Never done    COVID-19 Vaccine (4 - 2024-25 season) 09/01/2024    Influenza Vaccine (1) 10/01/2024    Annual Depression Screening  01/01/2025    Fall Risk Screening (Annual)  Completed    Pneumococcal Vaccine: 50+ Years  Completed    Meningococcal B Vaccine  Aged Out

## 2025-03-20 ENCOUNTER — MED REC SCAN ONLY (OUTPATIENT)
Dept: INTERNAL MEDICINE CLINIC | Facility: CLINIC | Age: 70
End: 2025-03-20

## 2025-03-25 DIAGNOSIS — I48.91 ATRIAL FIBRILLATION, UNSPECIFIED TYPE (HCC): ICD-10-CM

## 2025-03-28 RX ORDER — ATORVASTATIN CALCIUM 10 MG/1
10 TABLET, FILM COATED ORAL NIGHTLY
Qty: 90 TABLET | Refills: 3 | Status: SHIPPED | OUTPATIENT
Start: 2025-03-28

## 2025-03-28 NOTE — TELEPHONE ENCOUNTER
Refill Per Protocol     Requested Prescriptions   Pending Prescriptions Disp Refills    ATORVASTATIN 10 MG Oral Tab [Pharmacy Med Name: ATORVASTATIN 10MG TABLETS] 90 tablet 3     Sig: TAKE 1 TABLET(10 MG) BY MOUTH EVERY NIGHT       Cholesterol Medication Protocol Passed - 3/28/2025 11:05 AM        Passed - ALT < 80     Lab Results   Component Value Date    ALT 13 03/01/2025             Passed - ALT resulted within past year        Passed - Lipid panel within past 12 months     Lab Results   Component Value Date    CHOLEST 141 03/01/2025    TRIG 77 03/01/2025    HDL 44 03/01/2025    LDL 82 03/01/2025    VLDL 12 03/01/2025    NONHDLC 97 03/01/2025             Passed - In person appointment or virtual visit in the past 12 mos or appointment in next 3 mos     Recent Outpatient Visits              1 week ago Encounter for annual health examination    [facility] Jose Martin Hitchcock Sarah, APRN    Office Visit    1 month ago Palpitations    [facility] Jose Martin Hitchcock Sarah, APRN    Office Visit    3 months ago Urinary urgency    [facility] Jose Martin Hitchcock Christina, APRN    Office Visit    8 months ago Sleep concern    [facility]    Office Visit    8 months ago Insomnia, unspecified type    [facility] Jose Martin Hitchcock Sarah, APRN    Office Visit                      Passed - Medication is active on med list

## 2025-05-24 ENCOUNTER — HOSPITAL ENCOUNTER (OUTPATIENT)
Dept: MAMMOGRAPHY | Age: 70
Discharge: HOME | End: 2025-05-24
Payer: MEDICARE

## 2025-05-24 ENCOUNTER — HOSPITAL ENCOUNTER (OUTPATIENT)
Dept: BONE DENSITY | Age: 70
Discharge: HOME | End: 2025-05-24
Payer: MEDICARE

## 2025-05-24 DIAGNOSIS — Z78.0 POST-MENOPAUSAL: ICD-10-CM

## 2025-05-24 DIAGNOSIS — Z12.31 SCREENING MAMMOGRAM FOR BREAST CANCER: ICD-10-CM

## 2025-05-24 PROCEDURE — 77063 BREAST TOMOSYNTHESIS BI: CPT

## 2025-05-24 PROCEDURE — 77080 DXA BONE DENSITY AXIAL: CPT

## 2025-05-24 PROCEDURE — 77067 SCR MAMMO BI INCL CAD: CPT

## 2025-06-09 ENCOUNTER — TELEPHONE (OUTPATIENT)
Dept: INTERNAL MEDICINE CLINIC | Facility: CLINIC | Age: 70
End: 2025-06-09

## 2025-06-09 NOTE — TELEPHONE ENCOUNTER
Patient calling, having teeth extraction scheduled for Next Wednesday. Patient asking when she should stop Xarelto prior to procedure and when to resume.     Patient does see Dr. Rashid, not interested in contacting them about this, would like PCP office recommendations.     Please advise.

## 2025-06-09 NOTE — TELEPHONE ENCOUNTER
Patient contacted in French and made aware of Dr. Mccord's interpretation and recommendations. Patient verbalized understanding. No further questions or concerns at this time.

## 2025-06-09 NOTE — TELEPHONE ENCOUNTER
Ok to hold 3 days prior and restart when dentist says its ok , usually that night after the procedure   She may be due to see cardiology if notes are correct and shewas last seen in 2023

## 2025-06-18 ENCOUNTER — TELEPHONE (OUTPATIENT)
Dept: INTERNAL MEDICINE CLINIC | Facility: CLINIC | Age: 70
End: 2025-06-18

## 2025-06-18 NOTE — TELEPHONE ENCOUNTER
The pt is due for colonoscopy mailing out letter also due for Mammogram apt scheduled fort 6/26/25

## 2025-06-26 ENCOUNTER — HOSPITAL ENCOUNTER (OUTPATIENT)
Dept: MAMMOGRAPHY | Facility: HOSPITAL | Age: 70
Discharge: HOME | End: 2025-06-26
Payer: MEDICARE

## 2025-06-26 ENCOUNTER — HOSPITAL ENCOUNTER (OUTPATIENT)
Dept: ULTRASOUND IMAGING | Facility: HOSPITAL | Age: 70
Discharge: HOME | End: 2025-06-26
Payer: MEDICARE

## 2025-06-26 DIAGNOSIS — R92.8 ABNORMAL MAMMOGRAM: ICD-10-CM

## 2025-06-26 PROCEDURE — 77065 DX MAMMO INCL CAD UNI: CPT

## 2025-06-26 PROCEDURE — 77061 BREAST TOMOSYNTHESIS UNI: CPT

## 2025-06-26 PROCEDURE — 76642 ULTRASOUND BREAST LIMITED: CPT

## (undated) DIAGNOSIS — K21.9 GASTROESOPHAGEAL REFLUX DISEASE, UNSPECIFIED WHETHER ESOPHAGITIS PRESENT: ICD-10-CM

## (undated) DIAGNOSIS — M18.0 OSTEOARTHRITIS OF CARPOMETACARPAL (CMC) JOINTS OF BOTH THUMBS, UNSPECIFIED OSTEOARTHRITIS TYPE: ICD-10-CM

## (undated) DIAGNOSIS — F41.9 ANXIETY: ICD-10-CM

## (undated) DIAGNOSIS — I48.91 ATRIAL FIBRILLATION, UNSPECIFIED TYPE (HCC): ICD-10-CM

## (undated) DEVICE — NEEDLE SPNL 20GA L3.5IN YEL QNCKE STYL DISP

## (undated) DEVICE — BANDAGE ROLL,100% COTTON, 6 PLY, LARGE: Brand: KERLIX

## (undated) DEVICE — 2DE14 2-0 PDO 24 X 24: Brand: 2DE14 2-0 PDO 24 X 24

## (undated) DEVICE — 450 ML BOTTLE OF 0.05% CHLORHEXIDINE GLUCONATE IN 99.95% STERILE WATER FOR IRRIGATION, USP AND APPLICATOR.: Brand: IRRISEPT ANTIMICROBIAL WOUND LAVAGE

## (undated) DEVICE — Device: Brand: STABLECUT®

## (undated) DEVICE — POLAR CARE CUBE COOLING UNIT

## (undated) DEVICE — MYKNEE PPS MIS TIBCUTBLOCK-MRI-GMK-RM-#4: Brand: MYKNEE PPS

## (undated) DEVICE — GAUZE SPONGES,12 PLY: Brand: CURITY

## (undated) DEVICE — SHORT THREADED PINS PACK: Brand: KNEE INSTRUMENTS

## (undated) DEVICE — 60 ML SYRINGE LUER-LOCK TIP: Brand: MONOJECT

## (undated) DEVICE — SOLUTION IRRIG 3000ML 0.9% NACL FLX CONT

## (undated) DEVICE — PAD THRP 16IN WRPON MU LNG STM

## (undated) DEVICE — GAMMEX® PI HYBRID SIZE 9, STERILE POWDER-FREE SURGICAL GLOVE, POLYISOPRENE AND NEOPRENE BLEND: Brand: GAMMEX

## (undated) DEVICE — 3M™ STERI-DRAPE™ U-DRAPE 1015: Brand: STERI-DRAPE™

## (undated) DEVICE — BATTERY

## (undated) DEVICE — SOL  .9 3000ML

## (undated) DEVICE — 75MM TROCAR SMOOTH PIN-D

## (undated) DEVICE — HANDLE SUR BLU PLAS LT FLX SLIP ON ST DISP

## (undated) DEVICE — BANDAGE FLXMSTR 11YDX6IN STRL

## (undated) DEVICE — TRI FLAT PIN-MED

## (undated) DEVICE — BANDAGE COMPR W6INXL11YD WVN COT/E CLP CLSR

## (undated) DEVICE — SMOOTH PINS PACK: Brand: KNEE INSTRUMENTS

## (undated) DEVICE — 2T11 #2 PDO 36 X 36: Brand: 2T11 #2 PDO 36 X 36

## (undated) DEVICE — MYKNEE MIS TIBIAL BONE MODEL LEFT MEDIAL: Brand: MY KNEE BONE MODELS

## (undated) DEVICE — COTTON ROLL: Brand: DEROYAL

## (undated) DEVICE — GAMMEX® NON-LATEX PI ORTHO SIZE 8.5, STERILE POLYISOPRENE POWDER-FREE SURGICAL GLOVE: Brand: GAMMEX

## (undated) DEVICE — GAMMEX® NON-LATEX PI ORTHO SIZE 9, STERILE POLYISOPRENE POWDER-FREE SURGICAL GLOVE: Brand: GAMMEX

## (undated) DEVICE — MYKNEE PPS MIS TIBCUTBLOCK-MRI-GMK-LM-#4: Brand: MYKNEE PPS

## (undated) DEVICE — APPLICATOR SKIN PREP 26ML HI LT ORNG 2% CHG

## (undated) DEVICE — SHEET,DRAPE,53X77,STERILE: Brand: MEDLINE

## (undated) DEVICE — VIOLET BRAIDED (POLYGLACTIN 910), SYNTHETIC ABSORBABLE SUTURE: Brand: COATED VICRYL

## (undated) DEVICE — DERMABOND LIQUID ADHESIVE

## (undated) DEVICE — ADHESIVE SKIN TOP FOR WND CLSR DERMBND ADV

## (undated) DEVICE — DRESSING 10X4IN ANMC SAFETAC

## (undated) DEVICE — SCREWS PACK: Brand: KNEE INSTRUMENTS

## (undated) DEVICE — DRAPE SHEET LG

## (undated) DEVICE — HOOD: Brand: FLYTE

## (undated) DEVICE — NEEDLE SPINAL 20X3-1/2 YELLOW

## (undated) DEVICE — MYKNEE PPS STD FEMDISCUTBLOCK-MRI-GMK-LM-#5+: Brand: MYKNEE PPS

## (undated) DEVICE — WRAP COOLING KNEE W/ICE PILLOW

## (undated) DEVICE — CONTAINER SPEC STR 4OZ GRY LID

## (undated) DEVICE — MYKNEE MIS TIBIAL BONE MODEL RIGHT MEDIAL: Brand: MY KNEE BONE MODELS

## (undated) DEVICE — 3.5 FEMALE HEX HEAD PIN-Z

## (undated) DEVICE — PACK CDS TOTAL KNEE

## (undated) DEVICE — SPONGE GZ 4XL4IN 100% COT 12 PLY TYP VII WVN

## (undated) DEVICE — MYKNEE FEMUR BONE MODEL LEFT: Brand: MY KNEE BONE MODELS

## (undated) DEVICE — TOTAL KNEE: Brand: MEDLINE INDUSTRIES, INC.

## (undated) DEVICE — BLADE SAW SAGITTAL 19.5

## (undated) DEVICE — TRAY CATH 16FR F INCLUDE BARDX IC COMPLT CARE

## (undated) DEVICE — STERILE POLYISOPRENE POWDER-FREE SURGICAL GLOVES: Brand: PROTEXIS

## (undated) DEVICE — SUTURE VICRYL 2-0 FS-1

## (undated) DEVICE — CHLORAPREP 26ML APPLICATOR

## (undated) DEVICE — MYKNEE FEMUR BONE MODEL RIGHT: Brand: MY KNEE BONE MODELS

## (undated) DEVICE — CEMENT MIXING SYSTEM WITH FEMORAL BREAKWAY NOZZLE: Brand: REVOLUTION

## (undated) DEVICE — DISPOSABLE TOURNIQUET CUFF SINGLE BLADDER, DUAL PORT AND QUICK CONNECT CONNECTOR: Brand: COLOR CUFF

## (undated) DEVICE — SUT VCRL 2-0 27IN FS-1 ABSRB UD L24MM 3/8 CIR

## (undated) DEVICE — BANDAGE,GAUZE,BULKEE II,4.5"X4.1YD,STRL: Brand: MEDLINE

## (undated) DEVICE — ADHESIVE LIQ 2/3ML VI MASTISOL

## (undated) DEVICE — MYKNEE PATIENT SPECIFIC GUIDES

## (undated) NOTE — Clinical Note
FYI, TCM call made, see [facility] notes. [facility] Scheduled PCP office TCM appointment with patient on 3/21/2024 at 10:40 am.

## (undated) NOTE — LETTER
11/30/20        David Irwin  [address] [address]      Dear [place] records indicate that you have outstanding lab work and or testing that was ordered for you and has not yet been completed:  Orders Placed This Encounter

## (undated) NOTE — LETTER
[facility]  [address] [address]  PH: [phone]  FAX: [phone]        25  Brittnee Ashbyaga, :  1955  [address] [facility] [address]        This is the [facility] [address], office of Dr. Rafaela Mccord.    Thank you for putting your trust in [facility].  Our goal is to deliver the highest quality healthcare and an exceptional patient experience. Review of your medical record shows you are due for the following:       Colonoscopy      Please call [phone] to schedule your appointment or schedule online via [employer].     If you changed to a new provider at another facility, please notify the clinic to update your records.    If you had any recent testing at another facility, please have your results faxed to our office at [phone].     Thank you and have a great day!

## (undated) NOTE — ED AVS SNAPSHOT
Bijal CAMPOVERDEN: [id number]    Department:  [facility] Emergency Department   Date of Visit:  11/25/2017           Disclosure     Insurance plans vary and the physician(s) referred by the ER may not be covered by your plan.  Please contact CARE PHYSICIAN AT ONCE OR RETURN IMMEDIATELY TO THE EMERGENCY DEPARTMENT. If you have been prescribed any medication(s), please fill your prescription right away and begin taking the medication(s) as directed.   If you believe that any of the medications

## (undated) NOTE — LETTER
[address] [place] [address]  Authorization for Invasive Procedures  Date: 07/09/2023          Time: 12pm    I hereby authorize Cardioversion/Automated Internal Cardiac Defibrillator check , my physician and his/her assistants (if applicable), which may include medical students, residents, and/or fellows, to perform the following surgical operation/ procedure and administer such anesthesia as may be determined necessary by my physician: Dr. Heath Bennett on [address] [facility]  2. I recognize that during the surgical operation/procedure, unforeseen conditions may necessitate additional or different procedures than those listed above. I, therefore, further authorize and request that the above-named surgeon, assistants, or designees perform such procedures as are, in their judgment, necessary and desirable. 3.   My surgeon/physician has discussed prior to my surgery the potential benefits, risks and side effects of this procedure; the likelihood of achieving goals; and potential problems that might occur during recuperation. They also discussed reasonable alternatives to the procedure, including risks, benefits, and side effects related to the alternatives and risks related to not receiving this procedure. I have had all my questions answered and I acknowledge that no guarantee has been made as to the result that may be obtained. 4.   Should the need arise during my operation/procedure, which includes change of level of care prior to discharge, I also consent to the administration of blood and/or blood products. Further, I understand that despite careful testing and screening of blood or blood products by collecting agencies, I may still be subject to ill effects as a result of receiving a blood transfusion and/or blood products.   The following are some, but not all, of the potential risks that can occur: fever and allergic reactions, hemolytic reactions, transmission of diseases such as Hepatitis, AIDS and Cytomegalovirus (CMV) and fluid overload. In the event that I wish to have an autologous transfusion of my own blood, or a directed donor transfusion, I will discuss this with my physician. Check only if Refusing Blood or Blood Products  I understand refusal of blood or blood products as deemed necessary by my physician may have serious consequences to my condition to include possible death. I hereby assume responsibility for my refusal and release the hospital, its personnel, and my physicians from any responsibility for the consequences of my refusal.         o  Refuse         5. I authorize the use of any specimen, organs, tissues, body parts or foreign objects that may be removed from my body during the operation/procedure for diagnosis, research or teaching purposes and their subsequent disposal by hospital authorities. I also authorize the release of specimen test results and/or written reports to my treating physician on the hospital medical staff or other referring or consulting physicians involved in my care, at the discretion of the Pathologist or my treating physician. 6.   I consent to the photographing or videotaping of the operations or procedures to be performed, including appropriate portions of my body for medical, scientific, or educational purposes, provided my identity is not revealed by the pictures or by descriptive texts accompanying them. If the procedure has been photographed/videotaped, the surgeon will obtain the original picture, image, videotape or CD. The hospital will not be responsible for storage, release or maintenance of the picture, image, tape or CD.    7.   I consent to the presence of a  or observers in the operating room as deemed necessary by my physician or their designees. 8.   I recognize that in the event my procedure results in extended X-Ray/fluoroscopy time, I may develop a skin reaction. 9.  If I have a Do Not Attempt Resuscitation (DNAR) order in place, that status will be suspended while in the operating room, procedural suite, and during the recovery period unless otherwise explicitly stated by me (or a person authorized to consent on my behalf). The surgeon or my attending physician will determine when the applicable recovery period ends for purposes of reinstating the DNAR order. 10. Patients having a sterilization procedure: I understand that if the procedure is successful the results will be permanent and it will therefore be impossible for me to inseminate, conceive, or bear children. I also understand that the procedure is intended to result in sterility, although the result has not been guaranteed. 11. I acknowledge that my physician has explained sedation/analgesia administration to me including the risk and benefits I consent to the administration of sedation/analgesia as may be necessary or desirable in the judgment of my physician.     I CERTIFY THAT I HAVE READ AND FULLY UNDERSTAND THE ABOVE CONSENT TO OPERATION and/or OTHER PROCEDURE.        ____________________________________       _________________________________      ______________________________  Signature of Patient         Signature of Responsible Person        Printed Name of Responsible Person        ____________________________________      _________________________________      ______________________________       Signature of Witness          Relationship to Patient                       Date                                       Time    Patient Name: Yang Huff     : 1955                 Printed: 2023      Medical Record #: [id number]                      Page 1 of 2          [place] My signature below affirms that prior to the time of the procedure; I have explained to the patient and/or his/her legal representative, the risks and benefits involved in the proposed treatment and any reasonable alternative to the proposed treatment. I have also explained the risks and benefits involved in refusal of the proposed treatment and alternatives to the proposed treatment and have answered the patient's questions.  If I have a significant financial interest in a co-management agreement or a significant financial interest in any product or implant, or other significant relationship used in this procedure/surgery, I have disclosed this and had a discussion with my patient.     _______________________________________________________________ _____________________________  Serenity Jett Physician)                                                                                         (Date)                                   (Time)    Patient Name: Arlet Lui     : 1955                 Printed: 2023      Medical Record #: [id number]                      Page 2 of 2

## (undated) NOTE — Clinical Note
Initial assessment completed with patient. Message sent to office to assist in scheduling.  No further outreach needed at this time.  Thank you!

## (undated) NOTE — LETTER
[facility]  [address] [place] [address]  [id number]  Phone: [phone]  Fax: [phone]       Hello,     This is the [facility], office of Dr. Rafaela Mccord    Thank you for putting your trust in [facility].  Our goal is to deliver the highest quality healthcare and an exceptional patient experience. Upon reviewing of your medical record shows you are due for the following:     Annual Physical   Mammogram   Colonoscopy  Dexa Scan    Please call [phone] to schedule your appointment or schedule online via [employer].     If you changed to a new provider at another facility, please notify the clinic to update your records.    If you had any recent testing at another facility, please have your results faxed to our office at [phone].     Thank you and have a great day! 02/21/24

## (undated) NOTE — LETTER
02/26/21        Addie Ortiz  [address] [address]      Dear Steven Welda records indicate that you have outstanding lab work and or testing that was ordered for you and has not yet been completed:  Orders Placed This Encounter